# Patient Record
Sex: MALE | Race: WHITE | NOT HISPANIC OR LATINO | Employment: UNEMPLOYED | URBAN - METROPOLITAN AREA
[De-identification: names, ages, dates, MRNs, and addresses within clinical notes are randomized per-mention and may not be internally consistent; named-entity substitution may affect disease eponyms.]

---

## 2018-02-15 ENCOUNTER — TELEPHONE (OUTPATIENT)
Dept: TRANSPLANT | Facility: CLINIC | Age: 28
End: 2018-02-15

## 2018-02-15 NOTE — TELEPHONE ENCOUNTER
Spoke to patient ([phone]) who says he would like to transfer service to [facility] from [facility]. States has not been treated post heart transplant in at least the past year. [facility] past provider was with Dr. Khan. Informed patient to request records from [facility] coordinator and have sent here to [facility] [place], [place]. My contact information including fax information given to patient.

## 2018-02-16 ENCOUNTER — TELEPHONE (OUTPATIENT)
Dept: TRANSPLANT | Facility: CLINIC | Age: 28
End: 2018-02-16

## 2018-02-16 NOTE — TELEPHONE ENCOUNTER
Spoke to patient and mother, Ms Bobo. Informed to speak with post heart transplant coordinator at [facility] about transferring care to [facility] and request to please have records sent over to [facility]. Contact information given including fax ([phone]). Verbalized understanding.Both appear to be pleasant. Call PRN.

## 2018-07-13 ENCOUNTER — TELEPHONE (OUTPATIENT)
Dept: TRANSPLANT | Facility: CLINIC | Age: 28
End: 2018-07-13

## 2018-07-13 NOTE — TELEPHONE ENCOUNTER
Called Dr. Jaeger's office and spoke with nurse. Informed her that we received fax to follow up on pt. Informed her that we needs pt's EKG, ECHo and most recent labs sent over. Gave fax number and call back number.

## 2018-07-13 NOTE — TELEPHONE ENCOUNTER
"Called pt regarding fax sent from Dr. Jaeger's office asking to follow up with pt .  Called pt and asked about recent visit with Dr. Holliday. Pt stated that he has been having significant swelling in his lower extremities and unable to tie his shoes, increased SOB, Abdominal pain and anxiety. Pt reports he saw Dr. Jaeger and was started on Aldactone 25 mg daily and taking prograf 1 mg twice daily . Reviewed other medications per clinic note: Valsartan 160 mg take once daily by mouth, Aldactone 25 mg daily by mouth, both started on 6/29/18, lasix 40 mg daily , tacrolimus 1 mg BID, Lipitor 40 mg QHS, and Plavix 75 mg daily. Pt stated that he is not taking any of his meds except tacro and aldactone.   Pt stated that he has not been taking his medications until he saw Dr. Jaeger, pt reports being out of medications or not taking medications since 2016. Pt stated that he last saw his transplant team at [facility] was in 2015 or 2016. Pt reports he believes his coordinator's name was Trina with Dr. Gómez. . Asked pt if he has every been in rejection, pt stated the he has in 2011 and 2012 "as a side effect of transplantation". Pt reports he was [place] in [place] for fluid overload. Pt reports " the DrFarzad Took 12 urinal gallons off because had so much fluid". Pt stated that he is unaware of the doctor's name. Pt reports that he has labs, EKG, and ECHO on Friday from Dr. Jaeger's office. Informed pt that I would call to get the records. Pt stated that he smokes marijuana 2 x daily for pain. Pt reports he has been in pain since prior to transplant and having more pain now in his stomach and it helps with his appetite and sleep . Pt reports he quit smoking "cold Turkey" 3 months ago and denies alcohol use. .     Per review. Informed pt that he may be in rejection d/t his s/s and also that he has been out of his immunosuppression for approx 2 years. Instructed pt to go to his local ER to be worked up for " possible rejection. Pt verbalized understanding and stated that he will go to [facility]. Informed him that I will inform the on call physician. Pt verbalized understanding and given coordinators phone number if he has any additional questions.

## 2018-07-16 ENCOUNTER — TELEPHONE (OUTPATIENT)
Dept: TRANSPLANT | Facility: CLINIC | Age: 28
End: 2018-07-16

## 2018-07-16 NOTE — TELEPHONE ENCOUNTER
Returned call to pt to see if he went to the ER on Friday. No answer. VMbox not set up, unable to leave message.       Called pt's mother. Pt's mother stated that he did not go to the ED on last week. Pt's mother states that the pt's is having a lot of swelling and states that the pt's legs are red and tight . She reports that the pt does whatever he wants to . She reports that the pt did not like his prior transplant team or doctors and refused to go back about 2 + years ago. She reports bringing pt to the ED last month d/t pt c/o not being able to breathe and extremely SOB. Informed her that we instructed pt to go to the ED locally for possible rejection as he has not been taking his immunosuppress, and following up with HTS, and SOB, abdominal pain, and increased swelling. Pt's mother stated that she will try to bring pt to the ER and will call back if pt refuses. Gave pt's mother my phone number to call back. No other questions asked.

## 2018-07-16 NOTE — TELEPHONE ENCOUNTER
Pt's mother called and stated that the pt and his dad would rather come to [facility] than go to a local facility such as [facility]. She reports that the pt feels that it would be better to come to clinic. Informed her that d/t pt's S/s we ask pt to go to local hospital so that he can safely transfer to our facility and would be monitored when transported. Also, informed pt's mother that if pt is too sick, he would then sent down to the ER if too sick to be seen in clinic.  Informed pt that I would review with Dr. Scott and call back with an update.     Reviewed with Dr. Scott and instructed to have pt seen in clinic . Returned call to pt's mother. She stated that she spoke with her son and instead of a clinic visit, she and the pt's father will bring the pt to [facility]'s ER in the morning. Verbalized understanding. Informed pt's mother to go to the local ED or call 911 if pt's symptoms worsens or feels he needs to be seen sooner. Pt's mother verbalized understanding.

## 2018-07-17 ENCOUNTER — HOSPITAL ENCOUNTER (INPATIENT)
Facility: HOSPITAL | Age: 28
LOS: 6 days | Discharge: HOME | DRG: 286 | End: 2018-07-23
Attending: EMERGENCY MEDICINE | Admitting: INTERNAL MEDICINE
Payer: MEDICAID

## 2018-07-17 ENCOUNTER — TELEPHONE (OUTPATIENT)
Dept: TRANSPLANT | Facility: CLINIC | Age: 28
End: 2018-07-17

## 2018-07-17 DIAGNOSIS — I50.9 CHF (CONGESTIVE HEART FAILURE): ICD-10-CM

## 2018-07-17 DIAGNOSIS — R06.02 SOB (SHORTNESS OF BREATH): Primary | ICD-10-CM

## 2018-07-17 DIAGNOSIS — I25.811 ATHEROSCLEROSIS OF NATIVE CORONARY ARTERY OF TRANSPLANTED HEART WITHOUT ANGINA PECTORIS: ICD-10-CM

## 2018-07-17 DIAGNOSIS — R73.9 HYPERGLYCEMIA: ICD-10-CM

## 2018-07-17 DIAGNOSIS — F12.90 MARIJUANA SMOKER: ICD-10-CM

## 2018-07-17 DIAGNOSIS — T86.21 CHRONIC REJECTION OF HEART TRANSPLANT: ICD-10-CM

## 2018-07-17 DIAGNOSIS — Z91.148 NONCOMPLIANCE WITH MEDICATIONS: ICD-10-CM

## 2018-07-17 DIAGNOSIS — T86.22 HEART TRANSPLANT FAILURE AND REJECTION: ICD-10-CM

## 2018-07-17 DIAGNOSIS — I50.9 HEART FAILURE: ICD-10-CM

## 2018-07-17 DIAGNOSIS — I50.43 ACUTE ON CHRONIC COMBINED SYSTOLIC AND DIASTOLIC HEART FAILURE: ICD-10-CM

## 2018-07-17 DIAGNOSIS — R06.02 SHORTNESS OF BREATH: ICD-10-CM

## 2018-07-17 DIAGNOSIS — Z94.1 HISTORY OF HEART TRANSPLANT: ICD-10-CM

## 2018-07-17 DIAGNOSIS — T86.21 HEART TRANSPLANT FAILURE AND REJECTION: ICD-10-CM

## 2018-07-17 LAB
ABO + RH BLD: NORMAL
ALBUMIN SERPL BCP-MCNC: 2.5 G/DL
ALP SERPL-CCNC: 191 U/L
ALT SERPL W/O P-5'-P-CCNC: 20 U/L
AMPHET+METHAMPHET UR QL: NORMAL
ANION GAP SERPL CALC-SCNC: 7 MMOL/L
AST SERPL-CCNC: 26 U/L
BARBITURATES UR QL SCN>200 NG/ML: NEGATIVE
BASOPHILS # BLD AUTO: 0.05 K/UL
BASOPHILS NFR BLD: 1 %
BENZODIAZ UR QL SCN>200 NG/ML: NORMAL
BILIRUB SERPL-MCNC: 0.9 MG/DL
BLD GP AB SCN CELLS X3 SERPL QL: NORMAL
BNP SERPL-MCNC: 504 PG/ML
BUN SERPL-MCNC: 14 MG/DL
BZE UR QL SCN: NEGATIVE
CALCIUM SERPL-MCNC: 8.7 MG/DL
CANNABINOIDS UR QL SCN: NORMAL
CHLORIDE SERPL-SCNC: 98 MMOL/L
CO2 SERPL-SCNC: 31 MMOL/L
CORONARY STENOSIS: ABNORMAL
CREAT SERPL-MCNC: 0.9 MG/DL
CREAT UR-MCNC: 54 MG/DL
DIFFERENTIAL METHOD: ABNORMAL
EOSINOPHIL # BLD AUTO: 0.1 K/UL
EOSINOPHIL NFR BLD: 2.4 %
ERYTHROCYTE [DISTWIDTH] IN BLOOD BY AUTOMATED COUNT: 15.9 %
EST. GFR  (AFRICAN AMERICAN): >60 ML/MIN/1.73 M^2
EST. GFR  (NON AFRICAN AMERICAN): >60 ML/MIN/1.73 M^2
ESTIMATED PA SYSTOLIC PRESSURE: 34.89
ETHANOL UR-MCNC: <10 MG/DL
GLUCOSE SERPL-MCNC: 84 MG/DL
HCT VFR BLD AUTO: 44.1 %
HGB BLD-MCNC: 14.1 G/DL
IMM GRANULOCYTES # BLD AUTO: 0.01 K/UL
IMM GRANULOCYTES NFR BLD AUTO: 0.2 %
INR PPP: 1
LYMPHOCYTES # BLD AUTO: 0.4 K/UL
LYMPHOCYTES NFR BLD: 8.6 %
MAGNESIUM SERPL-MCNC: 2.1 MG/DL
MCH RBC QN AUTO: 27.4 PG
MCHC RBC AUTO-ENTMCNC: 32 G/DL
MCV RBC AUTO: 86 FL
METHADONE UR QL SCN>300 NG/ML: NEGATIVE
MITRAL VALVE REGURGITATION: ABNORMAL
MONOCYTES # BLD AUTO: 0.6 K/UL
MONOCYTES NFR BLD: 11 %
NEUTROPHILS # BLD AUTO: 3.9 K/UL
NEUTROPHILS NFR BLD: 76.8 %
NRBC BLD-RTO: 0 /100 WBC
OPIATES UR QL SCN: NEGATIVE
PCP UR QL SCN>25 NG/ML: NEGATIVE
PLATELET # BLD AUTO: 165 K/UL
PMV BLD AUTO: 10.6 FL
POTASSIUM SERPL-SCNC: 3.7 MMOL/L
PROT SERPL-MCNC: 5.6 G/DL
PROTHROMBIN TIME: 10.6 SEC
RBC # BLD AUTO: 5.15 M/UL
RETIRED EF AND QEF - SEE NOTES: 25 (ref 55–65)
SODIUM SERPL-SCNC: 136 MMOL/L
TACROLIMUS BLD-MCNC: 2.1 NG/ML
TOXICOLOGY INFORMATION: NORMAL
TRICUSPID VALVE REGURGITATION: ABNORMAL
TROPONIN I SERPL DL<=0.01 NG/ML-MCNC: 0.02 NG/ML
TSH SERPL DL<=0.005 MIU/L-ACNC: 1.88 UIU/ML
WBC # BLD AUTO: 5.02 K/UL

## 2018-07-17 PROCEDURE — 25000003 PHARM REV CODE 250: Performed by: INTERNAL MEDICINE

## 2018-07-17 PROCEDURE — 25000003 PHARM REV CODE 250: Performed by: NURSE PRACTITIONER

## 2018-07-17 PROCEDURE — S0028 INJECTION, FAMOTIDINE, 20 MG: HCPCS | Performed by: INTERNAL MEDICINE

## 2018-07-17 PROCEDURE — 85610 PROTHROMBIN TIME: CPT

## 2018-07-17 PROCEDURE — B2111ZZ FLUOROSCOPY OF MULTIPLE CORONARY ARTERIES USING LOW OSMOLAR CONTRAST: ICD-10-PCS | Performed by: INTERNAL MEDICINE

## 2018-07-17 PROCEDURE — 36415 COLL VENOUS BLD VENIPUNCTURE: CPT

## 2018-07-17 PROCEDURE — 20000000 HC ICU ROOM

## 2018-07-17 PROCEDURE — 94761 N-INVAS EAR/PLS OXIMETRY MLT: CPT

## 2018-07-17 PROCEDURE — 86703 HIV-1/HIV-2 1 RESULT ANTBDY: CPT

## 2018-07-17 PROCEDURE — 86832 HLA CLASS I HIGH DEFIN QUAL: CPT

## 2018-07-17 PROCEDURE — 99285 EMERGENCY DEPT VISIT HI MDM: CPT | Mod: 25

## 2018-07-17 PROCEDURE — 63600175 PHARM REV CODE 636 W HCPCS: Performed by: INTERNAL MEDICINE

## 2018-07-17 PROCEDURE — 86901 BLOOD TYPING SEROLOGIC RH(D): CPT

## 2018-07-17 PROCEDURE — 4A023N8 MEASUREMENT OF CARDIAC SAMPLING AND PRESSURE, BILATERAL, PERCUTANEOUS APPROACH: ICD-10-PCS | Performed by: INTERNAL MEDICINE

## 2018-07-17 PROCEDURE — 86833 HLA CLASS II HIGH DEFIN QUAL: CPT

## 2018-07-17 PROCEDURE — 84443 ASSAY THYROID STIM HORMONE: CPT

## 2018-07-17 PROCEDURE — 87340 HEPATITIS B SURFACE AG IA: CPT

## 2018-07-17 PROCEDURE — 86977 RBC SERUM PRETX INCUBJ/INHIB: CPT | Mod: 91

## 2018-07-17 PROCEDURE — 80197 ASSAY OF TACROLIMUS: CPT

## 2018-07-17 PROCEDURE — 93010 ELECTROCARDIOGRAM REPORT: CPT | Mod: ,,, | Performed by: INTERNAL MEDICINE

## 2018-07-17 PROCEDURE — 80307 DRUG TEST PRSMV CHEM ANLYZR: CPT

## 2018-07-17 PROCEDURE — 93306 TTE W/DOPPLER COMPLETE: CPT | Mod: 26,,, | Performed by: INTERNAL MEDICINE

## 2018-07-17 PROCEDURE — 27200044 CATH LAB PROCEDURE

## 2018-07-17 PROCEDURE — 25000003 PHARM REV CODE 250

## 2018-07-17 PROCEDURE — 86833 HLA CLASS II HIGH DEFIN QUAL: CPT | Mod: 91

## 2018-07-17 PROCEDURE — 85025 COMPLETE CBC W/AUTO DIFF WBC: CPT

## 2018-07-17 PROCEDURE — 93005 ELECTROCARDIOGRAM TRACING: CPT

## 2018-07-17 PROCEDURE — 93306 TTE W/DOPPLER COMPLETE: CPT

## 2018-07-17 PROCEDURE — 63600175 PHARM REV CODE 636 W HCPCS

## 2018-07-17 PROCEDURE — 63600175 PHARM REV CODE 636 W HCPCS: Performed by: NURSE PRACTITIONER

## 2018-07-17 PROCEDURE — 80197 ASSAY OF TACROLIMUS: CPT | Mod: 91

## 2018-07-17 PROCEDURE — 83880 ASSAY OF NATRIURETIC PEPTIDE: CPT

## 2018-07-17 PROCEDURE — 80053 COMPREHEN METABOLIC PANEL: CPT

## 2018-07-17 PROCEDURE — 83735 ASSAY OF MAGNESIUM: CPT

## 2018-07-17 PROCEDURE — B2161ZZ FLUOROSCOPY OF RIGHT AND LEFT HEART USING LOW OSMOLAR CONTRAST: ICD-10-PCS | Performed by: INTERNAL MEDICINE

## 2018-07-17 PROCEDURE — 99285 EMERGENCY DEPT VISIT HI MDM: CPT | Mod: ,,, | Performed by: PHYSICIAN ASSISTANT

## 2018-07-17 PROCEDURE — 86803 HEPATITIS C AB TEST: CPT

## 2018-07-17 PROCEDURE — 99291 CRITICAL CARE FIRST HOUR: CPT | Mod: ,,, | Performed by: INTERNAL MEDICINE

## 2018-07-17 PROCEDURE — 93460 R&L HRT ART/VENTRICLE ANGIO: CPT | Mod: 26,,, | Performed by: INTERNAL MEDICINE

## 2018-07-17 PROCEDURE — 84484 ASSAY OF TROPONIN QUANT: CPT

## 2018-07-17 PROCEDURE — 86832 HLA CLASS I HIGH DEFIN QUAL: CPT | Mod: 91

## 2018-07-17 PROCEDURE — 99152 MOD SED SAME PHYS/QHP 5/>YRS: CPT | Mod: ,,, | Performed by: INTERNAL MEDICINE

## 2018-07-17 RX ORDER — FAMOTIDINE 10 MG/ML
20 INJECTION INTRAVENOUS 2 TIMES DAILY
Status: COMPLETED | OUTPATIENT
Start: 2018-07-17 | End: 2018-07-18

## 2018-07-17 RX ORDER — DIPHENHYDRAMINE HYDROCHLORIDE 50 MG/ML
50 INJECTION INTRAMUSCULAR; INTRAVENOUS EVERY 6 HOURS
Status: COMPLETED | OUTPATIENT
Start: 2018-07-17 | End: 2018-07-18

## 2018-07-17 RX ORDER — SPIRONOLACTONE 25 MG/1
25 TABLET ORAL DAILY
Status: ON HOLD | COMMUNITY
End: 2018-07-23 | Stop reason: HOSPADM

## 2018-07-17 RX ORDER — MORPHINE SULFATE 4 MG/ML
3 INJECTION, SOLUTION INTRAMUSCULAR; INTRAVENOUS ONCE
Status: COMPLETED | OUTPATIENT
Start: 2018-07-17 | End: 2018-07-17

## 2018-07-17 RX ORDER — FUROSEMIDE 10 MG/ML
80 INJECTION INTRAMUSCULAR; INTRAVENOUS ONCE
Status: COMPLETED | OUTPATIENT
Start: 2018-07-17 | End: 2018-07-17

## 2018-07-17 RX ORDER — LISINOPRIL 5 MG/1
5 TABLET ORAL DAILY
Status: ON HOLD | COMMUNITY
End: 2018-07-23 | Stop reason: HOSPADM

## 2018-07-17 RX ORDER — ASPIRIN 81 MG/1
81 TABLET ORAL DAILY
Status: DISCONTINUED | OUTPATIENT
Start: 2018-07-18 | End: 2018-07-23 | Stop reason: HOSPADM

## 2018-07-17 RX ORDER — TACROLIMUS 1 MG/1
1 CAPSULE ORAL 2 TIMES DAILY
Status: DISCONTINUED | OUTPATIENT
Start: 2018-07-17 | End: 2018-07-19

## 2018-07-17 RX ORDER — METHYLPREDNISOLONE SOD SUCC 125 MG
125 VIAL (EA) INJECTION EVERY 6 HOURS
Status: COMPLETED | OUTPATIENT
Start: 2018-07-17 | End: 2018-07-18

## 2018-07-17 RX ORDER — TACROLIMUS 1 MG/1
CAPSULE ORAL EVERY 12 HOURS
Status: ON HOLD | COMMUNITY
End: 2018-07-23 | Stop reason: HOSPADM

## 2018-07-17 RX ORDER — TACROLIMUS 1 MG/1
1 CAPSULE ORAL 2 TIMES DAILY
Status: DISCONTINUED | OUTPATIENT
Start: 2018-07-17 | End: 2018-07-17

## 2018-07-17 RX ORDER — FUROSEMIDE 40 MG/1
40 TABLET ORAL 2 TIMES DAILY
Status: ON HOLD | COMMUNITY
End: 2018-07-23 | Stop reason: HOSPADM

## 2018-07-17 RX ORDER — CLOPIDOGREL 300 MG/1
600 TABLET, FILM COATED ORAL ONCE
Status: DISCONTINUED | OUTPATIENT
Start: 2018-07-17 | End: 2018-07-17

## 2018-07-17 RX ORDER — CLOPIDOGREL BISULFATE 75 MG/1
75 TABLET ORAL DAILY
Status: ON HOLD | COMMUNITY
End: 2018-07-23

## 2018-07-17 RX ORDER — CLOPIDOGREL BISULFATE 75 MG/1
75 TABLET ORAL DAILY
Status: DISCONTINUED | OUTPATIENT
Start: 2018-07-17 | End: 2018-07-23 | Stop reason: HOSPADM

## 2018-07-17 RX ADMIN — DIPHENHYDRAMINE HYDROCHLORIDE 50 MG: 50 INJECTION, SOLUTION INTRAMUSCULAR; INTRAVENOUS at 03:07

## 2018-07-17 RX ADMIN — METHYLPREDNISOLONE SODIUM SUCCINATE 125 MG: 125 INJECTION, POWDER, FOR SOLUTION INTRAMUSCULAR; INTRAVENOUS at 03:07

## 2018-07-17 RX ADMIN — TACROLIMUS 1 MG: 1 CAPSULE ORAL at 01:07

## 2018-07-17 RX ADMIN — FUROSEMIDE 80 MG: 10 INJECTION, SOLUTION INTRAMUSCULAR; INTRAVENOUS at 08:07

## 2018-07-17 RX ADMIN — MORPHINE SULFATE 3 MG: 4 INJECTION INTRAVENOUS at 09:07

## 2018-07-17 RX ADMIN — CLOPIDOGREL 75 MG: 75 TABLET, FILM COATED ORAL at 01:07

## 2018-07-17 RX ADMIN — TACROLIMUS 1 MG: 1 CAPSULE ORAL at 08:07

## 2018-07-17 RX ADMIN — METHYLPREDNISOLONE SODIUM SUCCINATE: 1 INJECTION, POWDER, LYOPHILIZED, FOR SOLUTION INTRAMUSCULAR; INTRAVENOUS at 08:07

## 2018-07-17 RX ADMIN — FAMOTIDINE 20 MG: 10 INJECTION, SOLUTION INTRAVENOUS at 03:07

## 2018-07-17 RX ADMIN — FUROSEMIDE 10 MG/HR: 10 INJECTION, SOLUTION INTRAMUSCULAR; INTRAVENOUS at 09:07

## 2018-07-17 NOTE — SUBJECTIVE & OBJECTIVE
Past Medical History:   Diagnosis Date    Heart transplanted     Hypertension        Past Surgical History:   Procedure Laterality Date    CARDIAC SURGERY      HEART TRANSPLANT         Review of patient's allergies indicates:  No Known Allergies    Current Facility-Administered Medications   Medication    clopidogrel tablet 75 mg    methylPREDNISolone sodium succinate (SOLU-MEDROL) 1,000 mg in dextrose 5 % 100 mL IVPB    tacrolimus capsule 1 mg     Family History     Problem Relation (Age of Onset)    Diabetes Mother    Hypertension Mother, Father        Social History Main Topics    Smoking status: Former Smoker     Quit date: 4/17/2018    Smokeless tobacco: Not on file    Alcohol use No    Drug use: Yes     Types: Marijuana      Comment: several times a day    Sexual activity: Not on file     Review of Systems   Constitutional: Negative.    HENT: Negative.    Eyes: Negative.    Respiratory: Positive for cough and shortness of breath.    Cardiovascular: Positive for leg swelling.   Gastrointestinal: Negative.    Endocrine: Negative.    Musculoskeletal: Negative.    Skin: Negative.    Allergic/Immunologic: Negative.    Neurological: Negative.    Hematological: Negative.    Psychiatric/Behavioral: Negative.      Objective:     Vital Signs (Most Recent):  Temp: 97.2 °F (36.2 °C) (07/17/18 1226)  Pulse: 100 (07/17/18 1226)  Resp: 16 (07/17/18 1226)  BP: 102/61 (07/17/18 1226)  SpO2: 100 % (07/17/18 1226) Vital Signs (24h Range):  Temp:  [97.2 °F (36.2 °C)-97.7 °F (36.5 °C)] 97.2 °F (36.2 °C)  Pulse:  [] 100  Resp:  [16-18] 16  SpO2:  [96 %-100 %] 100 %  BP: (101-120)/(61-77) 102/61     Patient Vitals for the past 72 hrs (Last 3 readings):   Weight   07/17/18 1203 94.2 kg (207 lb 10.8 oz)   07/17/18 0941 95.3 kg (210 lb)     Body mass index is 27.4 kg/m².    No intake or output data in the 24 hours ending 07/17/18 1247    Physical Exam   Constitutional: He is oriented to person, place, and time. He  appears well-developed and well-nourished.   HENT:   Head: Normocephalic and atraumatic.   Eyes: Conjunctivae and EOM are normal. Pupils are equal, round, and reactive to light.   Neck: Normal range of motion. Neck supple.   Unable to visualize JVP. No JVD.   Cardiovascular: Normal rate and regular rhythm.    + S4   Pulmonary/Chest: Effort normal.   Few scattered rhonchi   Abdominal: Soft. Bowel sounds are normal.   Musculoskeletal: Normal range of motion.   3-4+ edema scrotum down   Neurological: He is alert and oriented to person, place, and time.   Skin: Skin is warm and dry. Capillary refill takes less than 2 seconds.   Psychiatric: He has a normal mood and affect. His behavior is normal. Judgment and thought content normal.       Significant Labs:  CBC:    Recent Labs  Lab 07/17/18  1035   WBC 5.02   RBC 5.15   HGB 14.1   HCT 44.1      MCV 86   MCH 27.4   MCHC 32.0     BNP:    Recent Labs  Lab 07/17/18  1035   *     CMP:    Recent Labs  Lab 07/17/18  1035   GLU 84   CALCIUM 8.7   ALBUMIN 2.5*   PROT 5.6*      K 3.7   CO2 31*   CL 98   BUN 14   CREATININE 0.9   ALKPHOS 191*   ALT 20   AST 26   BILITOT 0.9      Coagulation:   No results for input(s): PT, INR, APTT in the last 168 hours.  LDH:  No results for input(s): LDH in the last 72 hours.  Microbiology:  Microbiology Results (last 7 days)     ** No results found for the last 168 hours. **          I have reviewed all pertinent labs within the past 24 hours.    Diagnostic Results:  I have reviewed all pertinent imaging results/findings within the past 24 hours.

## 2018-07-17 NOTE — NURSING TRANSFER
Nursing Transfer Note      7/17/2018     Transfer From: ER     Transfer via stretcher    Transfer with cardiac monitoring    Transported by pt escort         Medicines sent: No    Chart send with patient: Yes    Notified: mother and father at bedside    Upon arrival to floor: patient oriented to room, call bell in reach and bed in lowest position

## 2018-07-17 NOTE — ED NOTES
LOC: The patient is awake, alert, and aware of environment. The patient is oriented x 3 and speaking appropriately.   APPEARANCE: No acute distress noted.   PSYCHOSOCIAL: Patient is calm and cooperative.   SKIN: The skin is warm, dry.   RESPIRATORY: Airway is open and patent. Bilateral chest rise and fall. Respirations are spontaneous, even and unlabored. Normal effort and rate noted. No accessory muscle use noted.   CARDIAC: Patient has a normal rate and rhythm. Pt reports he always sob.   ABDOMEN: Soft and non tender to palpation. No distention noted.   URINARY:  Pt reports its difficult to void unless taking his fluid pill.   NEUROLOGIC: Eyes open spontaneously. Speech clear. Tolerating saliva secretions well. Able to follow commands, demonstrating ability to actively and appropriately communicate within context of current conversation. Symmetrical facial muscles. Moving all extremities well. Movement is purposeful.   MUSCULOSKELETAL: Pt has bilateral 3+ pitting edema.

## 2018-07-17 NOTE — ASSESSMENT & PLAN NOTE
- LHC +/- PCI with IVUS; right CFA access; JR4/JL4 4Fr diagnostic and 6Fr PCI  - RHC with biopsy via the right CFV  - HF and rejection management per HTS  - IV methypred and H1/H2 blockers 2/2 dye allergy  - discussed with interventional fellow: 600 mg IV Plavix x1    -The risks, benefits & alternatives of the procedure were explained to the patient.    -The risks of coronary angiography include but are not limited to:  Bleeding, infection, heart rhythm abnormalities, allergic reactions, kidney injury, stroke and death.    -Should stenting be indicated, the patient has agreed to dual anti-platelet therapy for 1-consecutive year with a drug-eluting stent and a minimum of 1-month with the use of a bare metal stent.    -The risks of moderate sedation include hypotension, respiratory depression, arrhythmias, bronchospasm, & death.    -Informed consent was obtained & the patient is agreeable to proceed with the procedure.  -This patient was discussed with the attending interventional cardiologist who agrees with the above assessment & plan.    Cardiac arrest out of hospital? No    CSHA Clinical Frailty Scale: Mildly frail    Heart failure: yes    Diabetes Mellitus: no    Dialysis: no

## 2018-07-17 NOTE — ASSESSMENT & PLAN NOTE
27 y.o M with a PMHx of OHTx in 2010 that presents with 1 month worsening of CORTEZ, LE edema, abdominal swelling with associated vomiting for the last 2-3 days. He initially was transplanted at [facility] in 2010 and followed up with their heart transplant group until 2015. Stopped taking his immunosupressants at that time as he did not have a prescription. Recently restarted back on Tacrolimus by a cardiologist in his hometown for the last year. EF on bedside echo was 10% with moderate RV dysfunction. Concern for rejection of transplanted heart.    Plan  - Solumedrol 1g x 1  - Admit to [facility], place in CCU  - 2D echo w/ CFD    Plan discussed with Dr. Abdi

## 2018-07-17 NOTE — NURSING
Went to pt's room. Pt signed OMKAR forms. Faxed request to [facility] . Awaiting records.

## 2018-07-17 NOTE — ED PROVIDER NOTES
Encounter Date: 7/17/2018    SCRIBE #1 NOTE: I, Arturo Mojica, am scribing for, and in the presence of,  Dr. Lopes. I have scribed the following portions of the note - the EKG reading and the APC attestation.       History     Chief Complaint   Patient presents with    Shortness of Breath     had heart transplant in 2010 at [facility], here to see if in rejection     Patient is a 27-year-old male status post heart transplant in 2010 for cardiomyopathy, hypertension is presenting to the ER for evaluation of shortness of breath.  This has been ongoing for approximately 3-4 weeks.  He states it is usually on exertion.  He has also had some intermittent chest discomfort with exertion.  He also complains of increasing swelling to his lower extremities that radiate into his scrotum.  He states he has not been compliant with his if verapamil as he ran out of this medication about a month ago.  He is also noncompliant with his immunosuppression medications.  Patient was directed to come to [facility] the facility by his cardiologist from [place], [place].  Family states that blood work was obtained last week and there is some concern for rejection.  Patient has not had any fever or chills.  No URI symptoms. He has had some intermittent nausea and vomiting, no abdominal pain or diarrhea otherwise.  Heart transplant was done at [facility]      The history is provided by the patient.     Review of patient's allergies indicates:  No Known Allergies  Past Medical History:   Diagnosis Date    Heart transplanted     Hypertension      Past Surgical History:   Procedure Laterality Date    CARDIAC SURGERY      HEART TRANSPLANT       Family History   Problem Relation Age of Onset    Diabetes Mother     Hypertension Mother     Hypertension Father      Social History   Substance Use Topics    Smoking status: Former Smoker     Quit date: 4/17/2018    Smokeless tobacco: Not on file    Alcohol use No     Review of Systems    Constitutional: Negative for fever.   HENT: Negative for congestion.    Eyes: Negative for photophobia and visual disturbance.   Respiratory: Positive for shortness of breath. Negative for cough.    Cardiovascular: Positive for leg swelling. Negative for chest pain and palpitations.   Gastrointestinal: Positive for nausea. Negative for abdominal pain and vomiting.   Genitourinary: Positive for scrotal swelling. Negative for dysuria.   Musculoskeletal: Negative for myalgias.   Skin: Negative for rash and wound.   Allergic/Immunologic: Positive for immunocompromised state.   Neurological: Negative for dizziness, syncope, weakness and light-headedness.   Hematological: Does not bruise/bleed easily.   Psychiatric/Behavioral: Negative for confusion.       Physical Exam     Initial Vitals [07/17/18 0941]   BP Pulse Resp Temp SpO2   101/67 105 18 97.7 °F (36.5 °C) 96 %      MAP       --         Physical Exam    Constitutional: He appears well-developed and well-nourished. He is not diaphoretic. No distress.   HENT:   Head: Normocephalic and atraumatic.   Mouth/Throat: Oropharynx is clear and moist.   Eyes: Conjunctivae and EOM are normal.   Neck: Neck supple.   Cardiovascular: Normal rate, regular rhythm, normal heart sounds, intact distal pulses and normal pulses.   3+ pitting edema lower extremities radiating up into thighs bilaterally    Pulmonary/Chest: Breath sounds normal. No respiratory distress.   Abdominal: Soft. He exhibits no distension. There is no tenderness.   Neurological: He is alert and oriented to person, place, and time.   Skin: Skin is warm and dry.         ED Course   Procedures  Labs Reviewed   CBC W/ AUTO DIFFERENTIAL - Abnormal; Notable for the following:        Result Value    RDW 15.9 (*)     Lymph # 0.4 (*)     Gran% 76.8 (*)     Lymph% 8.6 (*)     All other components within normal limits   B-TYPE NATRIURETIC PEPTIDE - Abnormal; Notable for the following:      (*)     All other  components within normal limits   COMPREHENSIVE METABOLIC PANEL - Abnormal; Notable for the following:     CO2 31 (*)     Total Protein 5.6 (*)     Albumin 2.5 (*)     Alkaline Phosphatase 191 (*)     Anion Gap 7 (*)     All other components within normal limits   TROPONIN I   MAGNESIUM   POCT GLUCOSE MONITORING CONTINUOUS     EKG Readings: (Independently Interpreted)   NSR with a heart rate of 98 bpm. RBBB present.        Imaging Results          X-Ray Chest PA And Lateral (Final result)  Result time 07/17/18 12:20:47    Final result by Zaki Dawkins MD (07/17/18 12:20:47)                 Impression:      Mild cardiomegaly.      Electronically signed by: Zaki Dawkins MD  Date:    07/17/2018  Time:    12:20             Narrative:    EXAMINATION:  XR CHEST PA AND LATERAL    TECHNIQUE:  PA and lateral views of the chest were performed.    COMPARISON:  None    FINDINGS:  Sternotomy.  Cardiomegaly.  Minimal parenchymal scarring.  Lobar consolidation.  Osseous thorax intact.                                 Medical Decision Making:   History:   Old Medical Records: I decided to obtain old medical records.  Independently Interpreted Test(s):   I have ordered and independently interpreted EKG Reading(s) - see prior notes  Clinical Tests:   Lab Tests: Ordered and Reviewed  Radiological Study: Ordered and Reviewed  Medical Tests: Ordered and Reviewed  Other:   I have discussed this case with another health care provider.       APC / Resident Notes:   Patient was seen in the ER promptly upon arrival.  He is afebrile, no acute distress. He is able to the speak in full complete sentences without difficulty.  O2 saturations on room air is 99%.  Physical examination does reveal lower extremity edema radiating up into his thigh and groin.  No previous records noted in epic at this time.    Cardiology was consulted.  Cardiac workup initiated.  Laboratory studies show normal white count of 5.0.  Hemoglobin stable. BNP  elevated to 504.  Troponin unremarkable. Normal sinus rhythm at a rate of 98 bmp, RBBB present.  X-ray of chest revealed mild cardiomegaly.        Cardiology will admit patient to their service for heart transplant failure.  Upon reassessment patient is resting comfortably.  He continues to be stable during his stay in the ED and will be admitted to the ICU for close monitoring. The care of this patient was overseen by attending physician who agrees with treatment, plan, and disposition.           Scribe Attestation:   Scribe #1: I performed the above scribed service and the documentation accurately describes the services I performed. I attest to the accuracy of the note.    Attending Attestation:     Physician Attestation Statement for NP/PA:   I have conducted a face to face encounter with this patient in addition to the NP/PA, due to Medical Complexity    Other NP/PA Attestation Additions:    History of Present Illness: Pt presents with SOB and fluid retention with a hx of a heart transplant 10 years ago.     Medical Decision Making: Will discuss case with cardiology.                     Clinical Impression:   The primary encounter diagnosis was SOB (shortness of breath). Diagnoses of Shortness of breath, Chronic rejection of heart transplant, History of heart transplant, and Heart transplant failure and rejection were also pertinent to this visit.      Disposition:   Disposition: Admitted  Condition: Stable                        Lupe Corado PA-C  07/17/18 9404

## 2018-07-17 NOTE — PROVIDER PROGRESS NOTES - EMERGENCY DEPT.
Encounter Date: 7/17/2018    ED Physician Progress Notes         EKG - STEMI Decision  Initial Reading: No STEMI present.  Response: No Action Needed.

## 2018-07-17 NOTE — ASSESSMENT & PLAN NOTE
- LHC +/- PCI with IVUS; right CFA access; JR4/JL4 4Fr diagnostic and 6Fr PCI  - RHC with biopsy via the right CFV  - HF and rejection management per HTS  - IV methypred and H1/H2 blockers 2/2 dye allergy  - discussed with interventional fellow: no Plavix load    -The risks, benefits & alternatives of the procedure were explained to the patient.    -The risks of coronary angiography include but are not limited to:  Bleeding, infection, heart rhythm abnormalities, allergic reactions, kidney injury, stroke and death.    -Should stenting be indicated, the patient has agreed to dual anti-platelet therapy for 1-consecutive year with a drug-eluting stent and a minimum of 1-month with the use of a bare metal stent.    -The risks of moderate sedation include hypotension, respiratory depression, arrhythmias, bronchospasm, & death.    -Informed consent was obtained & the patient is agreeable to proceed with the procedure.  -This patient was discussed with the attending interventional cardiologist who agrees with the above assessment & plan.    Cardiac arrest out of hospital? No    CSHA Clinical Frailty Scale: Mildly frail    Heart failure: yes    Diabetes Mellitus: no    Dialysis: no

## 2018-07-17 NOTE — TELEPHONE ENCOUNTER
Called [facility] to request records. Spoke with Nurse in cardiology informing her that pt was in the ED and we need records such as LHC, BX's, rejections, clinic visit . Per review nurse stated that she is unaware if she can send over the records. Phone number and fax number given. Nurse stated that she would contact her supervisor and call back if regarding records.

## 2018-07-17 NOTE — HPI
27 y.o M with a PMhx of OHTx in 2010 at [facility] that presents to the ER with 1 month of worsening CORTEZ, LE swelling and abdominal swelling with 2-3 days of associated vomiting.     Patient was initially transplanted at [facility] in 2010 and followed up there until 2015. He has not seen a transplant cardiologist since that time and was off of his immunosupressants following that due to lack of a prescription. His local cardiologist (Dr. Jaeger) started him back on Tacrolimus approximately 1 year ago and started valsartan and aldactone in the last month. Patient reports that the only medications that he takes are Tacrolimus and Aldactone.     Patient's mother called into transplant clinic earlier this week and patient was instructed to go to the local ER and transferred for further care.

## 2018-07-17 NOTE — HPI
"28 y/o male with PMH of HTN, HLD, heart transplant in 2010 ([facility]), CAD s/p PCI to LAD  (2 overlapping JOSE) and RPL (1 JOSE) in 6/2016, HFrEF (EF 25-30% with depressed RV function and elevated filling pressures in 6/2016) and chronic rejection of a heart transplant who presented to a cardiologist in [place], [place] to establish care (Dr. Jaeger) today with LE swelling. His has LE edema, N/V and CORTEZ for two months. He reports CORTEZ to four blocks as well as sharp CP that lasts seconds during exertion. He admits to medical nonadherence to immunosuppressive therapy because of "working, staying busy". The patient reports non-adherence to his immunosuppressants for about five years. He denies smoking tobacco and EtOH use; however, he smokes marijuana. The patient reports orthopnea and cough when lying flat but denies PND. BNP noted to be 504. Troponin normal at 0.023. CXR with cardiomegaly. ECG c/w ST, RAD, iRBBB with  BPM. He has an allergy to contrast. The patient last ate at 6A. His EF 45-50% in 2/2018. The patient and his family say he has stents placed in the right groin because "they couldn't get in" for a [facility].     TTE today    1 - Post-cardiac transplantation study.     2 - Severely depressed left ventricular systolic function (EF 25-30%).     3 - Upper limit of normal left ventricular enlargement.     4 - Right ventricular enlargement with not well seen systolic function.     5 - The estimated PA systolic pressure is 35 mmHg. Septal flattening consistent wtih RV pressure and volume overload, PA pressure is underestimated.     6 - Trivial to mild mitral regurgitation.     7 - Trivial to mild tricuspid regurgitation.     8 - Trivial pulmonic regurgitation.     9 - Increased central venous pressure.     Home medications:  Lisinopril 5 mg qD  Lasix 40 mg qD  Spironolactone 25 mg qD  Plavix 75 mg qD  "

## 2018-07-17 NOTE — HPI
28 yo male, new to our service, s/p OHTx in 2010 at [facility] presented to the ED with ~ 2 week h/o worsening CORTEZ and JUN that extends up to scrotum. He relates that he cut ties with the [facility] program ~ 2-3 years ago after undergoing coronary PCI's to his transplanted heart. Was doing well until April when he was hospitalized at [facility] for volume overload and was diuresed. Was seen by Dr. Holliday 7/6/18 and TTE on that day was 53%, RV mod enlarge, LA markedly dilated and mild to mod MR. TTE today shows decline in LVEF to 25-30%.    He admits that he takes his meds sporadically. Supposed to be on Prograf 1 mg bid. Also supposed to take Plavix for his PCI's, and says he takes Lasix 80 mg from time to time. Brought his pill bottles - all are out of date. He admits to smoking marijuana daily. 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 Normotensive.

## 2018-07-17 NOTE — H&P
[facility]  Heart Transplant  H&P    Patient Name: Jamar Smiley  MRN: [id number]  Admission Date: 7/17/2018  Attending Physician: Joanne Abdi MD  Primary Care Provider: Primary Doctor No  Principal Problem:Heart transplant failure and rejection    Subjective:     History of Present Illness:  28 yo male, new to our service, s/p OHTx in 2010 at [facility] presented to the ED with ~ 2 week h/o worsening CORTEZ and JUN that extends up to scrotum. He relates that he cut ties with the [facility] program ~ 2-3 years ago after undergoing coronary PCI's to his transplanted heart. Was doing well until April when he was hospitalized at [facility] for volume overload and was diuresed. Was seen by Dr. Holliday 7/6/18. TTE in February showed LVEF 53%, RV mod enlarge, LA markedly dilated and mild to mod MR. TTE today shows decline in LVEF to 25-30%.    He admits that he takes his meds sporadically. Supposed to be on Prograf 1 mg bid. Also supposed to take Plavix for his PCI's, and says he takes Lasix 80 mg from time to time. Brought his pill bottles - all are out of date. He admits to smoking marijuana daily. 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 Normotensive.    Past Medical History:   Diagnosis Date    Heart transplanted     Hypertension        Past Surgical History:   Procedure Laterality Date    CARDIAC SURGERY      HEART TRANSPLANT         Review of patient's allergies indicates:  No Known Allergies    Current Facility-Administered Medications   Medication    clopidogrel tablet 75 mg    methylPREDNISolone sodium succinate (SOLU-MEDROL) 1,000 mg in dextrose 5 % 100 mL IVPB    tacrolimus capsule 1 mg     Family History     Problem Relation (Age of Onset)    Diabetes Mother    Hypertension Mother, Father        Social History Main Topics    Smoking status: Former Smoker     Quit date: 4/17/2018    Smokeless tobacco: Not on file    Alcohol use No    Drug use: Yes      Types: Marijuana      Comment: several times a day    Sexual activity: Not on file     Review of Systems   Constitutional: Negative.    HENT: Negative.    Eyes: Negative.    Respiratory: Positive for cough and shortness of breath.    Cardiovascular: Positive for leg swelling.   Gastrointestinal: Negative.    Endocrine: Negative.    Musculoskeletal: Negative.    Skin: Negative.    Allergic/Immunologic: Negative.    Neurological: Negative.    Hematological: Negative.    Psychiatric/Behavioral: Negative.      Objective:     Vital Signs (Most Recent):  Temp: 97.2 °F (36.2 °C) (07/17/18 1226)  Pulse: 100 (07/17/18 1226)  Resp: 16 (07/17/18 1226)  BP: 102/61 (07/17/18 1226)  SpO2: 100 % (07/17/18 1226) Vital Signs (24h Range):  Temp:  [97.2 °F (36.2 °C)-97.7 °F (36.5 °C)] 97.2 °F (36.2 °C)  Pulse:  [] 100  Resp:  [16-18] 16  SpO2:  [96 %-100 %] 100 %  BP: (101-120)/(61-77) 102/61     Patient Vitals for the past 72 hrs (Last 3 readings):   Weight   07/17/18 1203 94.2 kg (207 lb 10.8 oz)   07/17/18 0941 95.3 kg (210 lb)     Body mass index is 27.4 kg/m².    No intake or output data in the 24 hours ending 07/17/18 1247    Physical Exam   Constitutional: He is oriented to person, place, and time. He appears well-developed and well-nourished.   HENT:   Head: Normocephalic and atraumatic.   Eyes: Conjunctivae and EOM are normal. Pupils are equal, round, and reactive to light.   Neck: Normal range of motion. Neck supple.   Unable to visualize JVP. No JVD.   Cardiovascular: Normal rate and regular rhythm.    + S4   Pulmonary/Chest: Effort normal.   Few scattered rhonchi   Abdominal: Soft. Bowel sounds are normal.   Musculoskeletal: Normal range of motion.   3-4+ edema scrotum down   Neurological: He is alert and oriented to person, place, and time.   Skin: Skin is warm and dry. Capillary refill takes less than 2 seconds.   Psychiatric: He has a normal mood and affect. His behavior is normal. Judgment and thought content  normal.       Significant Labs:  CBC:    Recent Labs  Lab 07/17/18  1035   WBC 5.02   RBC 5.15   HGB 14.1   HCT 44.1      MCV 86   MCH 27.4   MCHC 32.0     BNP:    Recent Labs  Lab 07/17/18  1035   *     CMP:    Recent Labs  Lab 07/17/18  1035   GLU 84   CALCIUM 8.7   ALBUMIN 2.5*   PROT 5.6*      K 3.7   CO2 31*   CL 98   BUN 14   CREATININE 0.9   ALKPHOS 191*   ALT 20   AST 26   BILITOT 0.9      Coagulation:   No results for input(s): PT, INR, APTT in the last 168 hours.  LDH:  No results for input(s): LDH in the last 72 hours.  Microbiology:  Microbiology Results (last 7 days)     ** No results found for the last 168 hours. **          I have reviewed all pertinent labs within the past 24 hours.    Diagnostic Results:  I have reviewed all pertinent imaging results/findings within the past 24 hours.    Assessment/Plan:     * Heart transplant failure and rejection    - Admit patient to ICU for close monitoring  - L/RHC with EMBx today, then give 1 gram IV Solu-Medrol  - Check DSA's - will try to obtain donor from [facility] or [facility] filling pressures determined  - Check Prograf level, goal level needs to be 5-10  - Continue Plavix for h/o PCI's and add ASA 81 mg qd  - Will need to add statin this admit        SOB (shortness of breath)    - See [place] [id number]  Heart Transplant  [facility]

## 2018-07-17 NOTE — ED TRIAGE NOTES
Patient states that he thinks he is in heart rejection. Patient had a heart transplant 3/2012. Patient states that he has had shortness of breath and swelling for the last few weeks. Denies pain, fever, chills.

## 2018-07-17 NOTE — SUBJECTIVE & OBJECTIVE
Past Medical History:   Diagnosis Date    Heart transplanted     Hypertension        Past Surgical History:   Procedure Laterality Date    CARDIAC SURGERY      HEART TRANSPLANT         Review of patient's allergies indicates:  No Known Allergies    PTA Medications   Medication Sig    clopidogrel (PLAVIX) 75 mg tablet Take 75 mg by mouth once daily.    furosemide (LASIX) 40 MG tablet Take 40 mg by mouth 2 (two) times daily.    lisinopril (PRINIVIL,ZESTRIL) 5 MG tablet Take 5 mg by mouth once daily.    spironolactone (ALDACTONE) 25 MG tablet Take 25 mg by mouth once daily.    tacrolimus (PROGRAF) 1 MG Cap Take by mouth every 12 (twelve) hours.     Family History     Problem Relation (Age of Onset)    Diabetes Mother    Hypertension Mother, Father        Social History Main Topics    Smoking status: Former Smoker     Quit date: 4/17/2018    Smokeless tobacco: Not on file    Alcohol use No    Drug use: Yes     Types: Marijuana      Comment: several times a day    Sexual activity: Not on file     Review of Systems   Constitution: Negative for chills and fever.   HENT: Negative for ear discharge.    Eyes: Negative for pain and visual disturbance.   Cardiovascular: Positive for chest pain, dyspnea on exertion and leg swelling. Negative for irregular heartbeat, orthopnea, palpitations, paroxysmal nocturnal dyspnea and syncope.   Respiratory: Negative for hemoptysis, shortness of breath and wheezing.    Skin: Negative for rash and suspicious lesions.   Musculoskeletal: Positive for joint pain. Negative for muscle weakness.   Gastrointestinal: Positive for nausea and vomiting. Negative for abdominal pain, diarrhea, hematemesis, hematochezia and melena.   Genitourinary: Negative for dysuria and frequency.   Neurological: Negative for focal weakness, headaches and tremors.   Psychiatric/Behavioral: Negative for altered mental status, suicidal ideas and thoughts of violence.     Objective:     Vital Signs (Most  Recent):  Temp: 97.7 °F (36.5 °C) (07/17/18 1400)  Pulse: 96 (07/17/18 1415)  Resp: (!) 27 (07/17/18 1415)  BP: 117/78 (07/17/18 1400)  SpO2: 100 % (07/17/18 1415) Vital Signs (24h Range):  Temp:  [97.2 °F (36.2 °C)-97.7 °F (36.5 °C)] 97.7 °F (36.5 °C)  Pulse:  [] 96  Resp:  [16-27] 27  SpO2:  [96 %-100 %] 100 %  BP: (101-120)/(61-78) 117/78     Weight: 96.8 kg (213 lb 6.5 oz)  Body mass index is 28.16 kg/m².    SpO2: 100 %  O2 Device (Oxygen Therapy): room air    No intake or output data in the 24 hours ending 07/17/18 1535    Lines/Drains/Airways     Peripheral Intravenous Line                 Peripheral IV - Single Lumen 07/17/18 1026 Right Forearm less than 1 day                Physical Exam   Constitutional: He is oriented to person, place, and time. He appears well-developed and well-nourished.   HENT:   Head: Normocephalic and atraumatic.   Eyes: EOM are normal.   Neck: Carotid bruit is not present.   Cardiovascular: Normal rate and regular rhythm.  Exam reveals no gallop and no friction rub.    Pulses:       Femoral pulses are 2+ on the left side.       Dorsalis pedis pulses are 1+ on the right side, and 1+ on the left side.        Posterior tibial pulses are 1+ on the right side, and 1+ on the left side.   Dopplerable right and left radial arteries. Dopplerable bilateral popliteal arteries. Dopplerable right femoral arteries. 1+ bilaterally LE edema L>R    Pulmonary/Chest: Effort normal. No stridor. He has wheezes. He has no rales.   Abdominal: Soft. Bowel sounds are normal. There is no rebound and no guarding.   Musculoskeletal: He exhibits no edema.   Neurological: He is alert and oriented to person, place, and time. No cranial nerve deficit.   Skin: Skin is warm and dry.   Right groin scar.   Psychiatric: He has a normal mood and affect. His behavior is normal.       Significant Labs:   CMP     Recent Labs  Lab 07/17/18  1035      K 3.7   CL 98   CO2 31*   GLU 84   BUN 14   CREATININE 0.9    CALCIUM 8.7   PROT 5.6*   ALBUMIN 2.5*   BILITOT 0.9   ALKPHOS 191*   AST 26   ALT 20   ANIONGAP 7*   ESTGFRAFRICA >60.0   EGFRNONAA >60.0   , CBC     Recent Labs  Lab 07/17/18  1035   WBC 5.02   HGB 14.1   HCT 44.1      , INR     Recent Labs  Lab 07/17/18  1308   INR 1.0   , Lipid Panel No results for input(s): CHOL, HDL, LDLCALC, TRIG, CHOLHDL in the last 48 hours. and Troponin     Recent Labs  Lab 07/17/18  1035   TROPONINI 0.023

## 2018-07-17 NOTE — SUBJECTIVE & OBJECTIVE
Past Medical History:   Diagnosis Date    Heart transplanted     Hypertension        Past Surgical History:   Procedure Laterality Date    CARDIAC SURGERY      HEART TRANSPLANT         Review of patient's allergies indicates:  No Known Allergies    No current facility-administered medications on file prior to encounter.      No current outpatient prescriptions on file prior to encounter.     Family History     Problem Relation (Age of Onset)    Diabetes Mother    Hypertension Mother, Father        Social History Main Topics    Smoking status: Former Smoker     Quit date: 4/17/2018    Smokeless tobacco: Not on file    Alcohol use No    Drug use: Yes     Types: Marijuana      Comment: several times a day    Sexual activity: Not on file     Review of Systems   Constitution: Positive for weakness. Negative for chills and fever.   HENT: Negative for hoarse voice and sore throat.    Cardiovascular: Positive for chest pain, dyspnea on exertion, leg swelling and near-syncope. Negative for claudication, cyanosis, irregular heartbeat, orthopnea, palpitations, paroxysmal nocturnal dyspnea and syncope.   Respiratory: Positive for shortness of breath. Negative for cough and hemoptysis.    Musculoskeletal: Negative for back pain, joint pain and joint swelling.   Gastrointestinal: Positive for bloating and vomiting. Negative for abdominal pain, constipation, diarrhea, hematochezia, melena and nausea.   Genitourinary: Negative for dysuria, hematuria and incomplete emptying.   Neurological: Positive for dizziness and light-headedness. Negative for headaches.   Psychiatric/Behavioral: Negative for altered mental status, depression and suicidal ideas. The patient is not nervous/anxious.      Objective:     Vital Signs (Most Recent):  Temp: 97.7 °F (36.5 °C) (07/17/18 0941)  Pulse: 100 (07/17/18 1024)  Resp: 18 (07/17/18 0941)  BP: 120/77 (07/17/18 1024)  SpO2: 99 % (07/17/18 1024) Vital Signs (24h Range):  Temp:  [97.7 °F  (36.5 °C)] 97.7 °F (36.5 °C)  Pulse:  [100-105] 100  Resp:  [18] 18  SpO2:  [96 %-99 %] 99 %  BP: (101-120)/(67-77) 120/77     Weight: 95.3 kg (210 lb)  Body mass index is 27.71 kg/m².    SpO2: 99 %       No intake or output data in the 24 hours ending 07/17/18 1122    Lines/Drains/Airways     Peripheral Intravenous Line                 Peripheral IV - Single Lumen 07/17/18 1026 Right Forearm less than 1 day                Physical Exam   Constitutional: He is oriented to person, place, and time. He appears well-developed and well-nourished. No distress.   HENT:   Head: Normocephalic and atraumatic.   Mouth/Throat: Oropharynx is clear and moist. No oropharyngeal exudate.   Eyes: Conjunctivae and EOM are normal. Pupils are equal, round, and reactive to light. Right eye exhibits no discharge. Left eye exhibits no discharge. No scleral icterus.   Neck: Normal range of motion. Neck supple. JVD present. No tracheal deviation present. No thyromegaly present.   Cardiovascular: Normal rate, regular rhythm and intact distal pulses.  Exam reveals gallop (S3 gallop). Exam reveals no friction rub.    No murmur heard.  Pulmonary/Chest: Effort normal. No respiratory distress. He has no wheezes. He has rales.   Abdominal: Soft. Bowel sounds are normal. He exhibits distension. He exhibits no mass. There is no tenderness. There is no rebound and no guarding.   Musculoskeletal: Normal range of motion. He exhibits edema (3+ BL LE edema up to thighs).   Lymphadenopathy:     He has no cervical adenopathy.   Neurological: He is alert and oriented to person, place, and time. No cranial nerve deficit.   Skin: Skin is warm and dry. He is not diaphoretic.   Psychiatric: He has a normal mood and affect. His behavior is normal. Judgment and thought content normal.   Nursing note and vitals reviewed.      Significant Labs:   CMP   Recent Labs  Lab 07/17/18  1035      K 3.7   CL 98   CO2 31*   GLU 84   BUN 14   CREATININE 0.9   CALCIUM 8.7    PROT 5.6*   ALBUMIN 2.5*   BILITOT 0.9   ALKPHOS 191*   AST 26   ALT 20   ANIONGAP 7*   ESTGFRAFRICA >60.0   EGFRNONAA >60.0   , CBC   Recent Labs  Lab 07/17/18  1035   WBC 5.02   HGB 14.1   HCT 44.1      , INR No results for input(s): INR, PROTIME in the last 48 hours. and Troponin   Recent Labs  Lab 07/17/18  1035   TROPONINI 0.023       Significant Imaging: CXR (7/17): Sternotomy.  Cardiomegaly.  Minimal parenchymal scarring.  Lobar consolidation.  Osseous thorax intact.     ECG: Sinus tachycardia, incomplete RBBB

## 2018-07-17 NOTE — NURSING TRANSFER
Nursing Transfer Note      7/17/2018     Transfer TO SICU [id number] From [id number]     Transfer via bed    Transfer with cardiac monitoring    Transported by ***    Medicines sent: ***    Chart send with patient: {YES (DEF)/NO:[id number]}    Notified: {NOTIFIED:30355}    Patient reassessed at: *** (date, time)    Upon arrival to floor: {IP NSG TRANSFER ARRIVAL [facility]:[id number]}

## 2018-07-17 NOTE — ASSESSMENT & PLAN NOTE
- Admit patient to ICU for close monitoring  - L/RHC with EMBx today, then give 1 gram IV Solu-Medrol  - Check DSA's - will try to obtain donor from [facility] or [facility]   Abhi one filling pressures determined  - Check Prograf level, goal level needs to be 5-10  - Continue Plavix for h/o PCI's and add ASA 81 mg qd  - Will need to add statin this admit

## 2018-07-17 NOTE — CONSULTS
"[facility]  Interventional Cardiology  Consult Note    Patient Name: Jamar Smiley  MRN: [id number]  Admission Date: 7/17/2018  Hospital Length of Stay: 0 days  Code Status: Full Code   Attending Provider: Joanne Abdi MD  Consulting Provider: Ny Raya MD  Primary Care Physician: Primary Doctor No  Principal Problem:Heart transplant failure and rejection    Patient information was obtained from patient and past medical records.     Inpatient consult to Interventional Cardiology  Consult performed by: NY RAYA  Consult ordered by: PIERRE CHURCH        Subjective:     Chief Complaint:  LE edema     HPI:  28 y/o male with PMH of HTN, HLD, heart transplant in 2010 ([facility]), CAD s/p PCI to LAD  (2 overlapping JOSE) and RPL (1 JOSE) in 6/2016, HFrEF (EF 25-30% with depressed RV function and elevated filling pressures in 6/2016) and chronic rejection of a heart transplant who presented to a cardiologist in [place], [place] to establish care (Dr. Jaeger) today with LE swelling. His has LE edema, N/V and CORTEZ for two months. He reports CORTEZ to four blocks as well as sharp CP that lasts seconds during exertion. He admits to medical nonadherence to immunosuppressive therapy because of "working, staying busy". The patient reports non-adherence to his immunosuppressants for about five years. He denies smoking tobacco and EtOH use; however, he smokes marijuana. The patient reports orthopnea and cough when lying flat but denies PND. BNP noted to be 504. Troponin normal at 0.023. CXR with cardiomegaly. ECG c/w ST, RAD, iRBBB with  BPM. He has an allergy to contrast. The patient last ate at 6A. His EF 45-50% in 2/2018. The patient and his family say he has stents placed in the right groin because "they couldn't get in" for a C.     TTE today    1 - Post-cardiac transplantation study.     2 - Severely depressed left ventricular systolic function (EF 25-30%).     3 - Upper limit of normal " left ventricular enlargement.     4 - Right ventricular enlargement with not well seen systolic function.     5 - The estimated PA systolic pressure is 35 mmHg. Septal flattening consistent wtih RV pressure and volume overload, PA pressure is underestimated.     6 - Trivial to mild mitral regurgitation.     7 - Trivial to mild tricuspid regurgitation.     8 - Trivial pulmonic regurgitation.     9 - Increased central venous pressure.     Home medications:  Lisinopril 5 mg qD  Lasix 40 mg qD  Spironolactone 25 mg qD  Plavix 75 mg qD    Past Medical History:   Diagnosis Date    Heart transplanted     Hypertension        Past Surgical History:   Procedure Laterality Date    CARDIAC SURGERY      HEART TRANSPLANT         Review of patient's allergies indicates:  No Known Allergies    PTA Medications   Medication Sig    clopidogrel (PLAVIX) 75 mg tablet Take 75 mg by mouth once daily.    furosemide (LASIX) 40 MG tablet Take 40 mg by mouth 2 (two) times daily.    lisinopril (PRINIVIL,ZESTRIL) 5 MG tablet Take 5 mg by mouth once daily.    spironolactone (ALDACTONE) 25 MG tablet Take 25 mg by mouth once daily.    tacrolimus (PROGRAF) 1 MG Cap Take by mouth every 12 (twelve) hours.     Family History     Problem Relation (Age of Onset)    Diabetes Mother    Hypertension Mother, Father        Social History Main Topics    Smoking status: Former Smoker     Quit date: 4/17/2018    Smokeless tobacco: Not on file    Alcohol use No    Drug use: Yes     Types: Marijuana      Comment: several times a day    Sexual activity: Not on file     Review of Systems   Constitution: Negative for chills and fever.   HENT: Negative for ear discharge.    Eyes: Negative for pain and visual disturbance.   Cardiovascular: Positive for chest pain, dyspnea on exertion and leg swelling. Negative for irregular heartbeat, orthopnea, palpitations, paroxysmal nocturnal dyspnea and syncope.   Respiratory: Negative for hemoptysis, shortness of  breath and wheezing.    Skin: Negative for rash and suspicious lesions.   Musculoskeletal: Positive for joint pain. Negative for muscle weakness.   Gastrointestinal: Positive for nausea and vomiting. Negative for abdominal pain, diarrhea, hematemesis, hematochezia and melena.   Genitourinary: Negative for dysuria and frequency.   Neurological: Negative for focal weakness, headaches and tremors.   Psychiatric/Behavioral: Negative for altered mental status, suicidal ideas and thoughts of violence.     Objective:     Vital Signs (Most Recent):  Temp: 97.2 °F (36.2 °C) (07/17/18 1226)  Pulse: 100 (07/17/18 1226)  Resp: 16 (07/17/18 1226)  BP: 102/61 (07/17/18 1226)  SpO2: 100 % (07/17/18 1226) Vital Signs (24h Range):  Temp:  [97.2 °F (36.2 °C)-97.7 °F (36.5 °C)] 97.2 °F (36.2 °C)  Pulse:  [] 100  Resp:  [16-18] 16  SpO2:  [96 %-100 %] 100 %  BP: (101-120)/(61-77) 102/61     Weight: 94.2 kg (207 lb 10.8 oz)  Body mass index is 27.4 kg/m².    SpO2: 100 %  O2 Device (Oxygen Therapy): room air    No intake or output data in the 24 hours ending 07/17/18 1244    Lines/Drains/Airways     Peripheral Intravenous Line                 Peripheral IV - Single Lumen 07/17/18 1026 Right Forearm less than 1 day                Physical Exam   Constitutional: He is oriented to person, place, and time. He appears well-developed and well-nourished.   HENT:   Head: Normocephalic and atraumatic.   Eyes: EOM are normal.   Neck: Carotid bruit is not present.   Cardiovascular: Normal rate and regular rhythm.  Exam reveals no gallop and no friction rub.    Pulses:       Femoral pulses are 2+ on the left side.       Dorsalis pedis pulses are 1+ on the right side, and 1+ on the left side.        Posterior tibial pulses are 1+ on the right side, and 1+ on the left side.   Dopplerable right and left radial artery as well as Dopplerable bilateral popliteal and right femoral arteries. 1+ bilaterally LE edema   Pulmonary/Chest: Effort normal. No  stridor. He has wheezes. He has no rales.   Abdominal: Soft. Bowel sounds are normal. There is no rebound and no guarding.   Musculoskeletal: He exhibits no edema.   Neurological: He is alert and oriented to person, place, and time. No cranial nerve deficit.   Skin: Skin is warm and dry.   Right groin scar.   Psychiatric: He has a normal mood and affect. His behavior is normal.       Significant Labs:   CMP   Recent Labs  Lab 07/17/18  1035      K 3.7   CL 98   CO2 31*   GLU 84   BUN 14   CREATININE 0.9   CALCIUM 8.7   PROT 5.6*   ALBUMIN 2.5*   BILITOT 0.9   ALKPHOS 191*   AST 26   ALT 20   ANIONGAP 7*   ESTGFRAFRICA >60.0   EGFRNONAA >60.0   , CBC   Recent Labs  Lab 07/17/18  1035   WBC 5.02   HGB 14.1   HCT 44.1      , INR   Recent Labs  Lab 07/17/18  1308   INR 1.0   , Lipid Panel No results for input(s): CHOL, HDL, LDLCALC, TRIG, CHOLHDL in the last 48 hours. and Troponin   Recent Labs  Lab 07/17/18  1035   TROPONINI 0.023         Assessment and Plan:     * Heart transplant failure and rejection    - LHC +/- PCI with IVUS; right CFA access; JR4/JL4 4Fr diagnostic and 6Fr PCI  - RHC with biopsy via the right CFV  - HF and rejection management per HTS  - IV methypred and H1/H2 blockers 2/2 dye allergy  - discussed with interventional fellow: 600 mg IV Plavix x1    -The risks, benefits & alternatives of the procedure were explained to the patient.    -The risks of coronary angiography include but are not limited to:  Bleeding, infection, heart rhythm abnormalities, allergic reactions, kidney injury, stroke and death.    -Should stenting be indicated, the patient has agreed to dual anti-platelet therapy for 1-consecutive year with a drug-eluting stent and a minimum of 1-month with the use of a bare metal stent.    -The risks of moderate sedation include hypotension, respiratory depression, arrhythmias, bronchospasm, & death.    -Informed consent was obtained & the patient is agreeable to proceed with  the procedure.  -This patient was discussed with the attending interventional cardiologist who agrees with the above assessment & plan.    Cardiac arrest out of hospital? No    CSHA Clinical Frailty Scale: Mildly frail    Heart failure: yes    Diabetes Mellitus: no    Dialysis: no            VTE Risk Mitigation     None          Thank you for your consult.     Charlie Ordoñez MD  Interventional Cardiology   [facility]

## 2018-07-17 NOTE — CONSULTS
[facility]  Cardiology  Consult Note    Patient Name: Jamar Smiley  MRN: [id number]  Admission Date: 7/17/2018  Hospital Length of Stay: 0 days  Code Status: Full Code   Attending Provider: Joanne Abdi MD   Consulting Provider: Cotl Merritt MD  Primary Care Physician: Primary Doctor No  Principal Problem:Heart transplant failure and rejection    Patient information was obtained from patient and past medical records.     Inpatient consult to Cardiology  Consult performed by: COLT MERRITT  Consult ordered by: WILFRED HELTON  Reason for consult: Hx of heart transplant, CORTEZ / LE edema x 1 month        Subjective:     Chief Complaint:  Hx of heart transplant, CORTEZ / LE edema x 1 month     HPI:   27 y.o M with a PMhx of OHTx in 2010 at [facility] that presents to the ER with 1 month of worsening CORTEZ, LE swelling and abdominal swelling with 2-3 days of associated vomiting.     Patient was initially transplanted at [facility] in 2010 and followed up there until 2015. He has not seen a transplant cardiologist since that time and was off of his immunosupressants following that due to lack of a prescription. His local cardiologist (Dr. Jaeger) started him back on Tacrolimus approximately 1 year ago and started valsartan and aldactone in the last month. Patient reports that the only medications that he takes are Tacrolimus and Aldactone.     Patient's mother called into transplant clinic earlier this week and patient was instructed to go to the local ER and transferred for further care.     Past Medical History:   Diagnosis Date    Heart transplanted     Hypertension        Past Surgical History:   Procedure Laterality Date    CARDIAC SURGERY      HEART TRANSPLANT         Review of patient's allergies indicates:  No Known Allergies    No current facility-administered medications on file prior to encounter.      No current outpatient prescriptions on file prior to encounter.     Family History      Problem Relation (Age of Onset)    Diabetes Mother    Hypertension Mother, Father        Social History Main Topics    Smoking status: Former Smoker     Quit date: 4/17/2018    Smokeless tobacco: Not on file    Alcohol use No    Drug use: Yes     Types: Marijuana      Comment: several times a day    Sexual activity: Not on file     Review of Systems   Constitution: Positive for weakness. Negative for chills and fever.   HENT: Negative for hoarse voice and sore throat.    Cardiovascular: Positive for chest pain, dyspnea on exertion, leg swelling and near-syncope. Negative for claudication, cyanosis, irregular heartbeat, orthopnea, palpitations, paroxysmal nocturnal dyspnea and syncope.   Respiratory: Positive for shortness of breath. Negative for cough and hemoptysis.    Musculoskeletal: Negative for back pain, joint pain and joint swelling.   Gastrointestinal: Positive for bloating and vomiting. Negative for abdominal pain, constipation, diarrhea, hematochezia, melena and nausea.   Genitourinary: Negative for dysuria, hematuria and incomplete emptying.   Neurological: Positive for dizziness and light-headedness. Negative for headaches.   Psychiatric/Behavioral: Negative for altered mental status, depression and suicidal ideas. The patient is not nervous/anxious.      Objective:     Vital Signs (Most Recent):  Temp: 97.7 °F (36.5 °C) (07/17/18 0941)  Pulse: 100 (07/17/18 1024)  Resp: 18 (07/17/18 0941)  BP: 120/77 (07/17/18 1024)  SpO2: 99 % (07/17/18 1024) Vital Signs (24h Range):  Temp:  [97.7 °F (36.5 °C)] 97.7 °F (36.5 °C)  Pulse:  [100-105] 100  Resp:  [18] 18  SpO2:  [96 %-99 %] 99 %  BP: (101-120)/(67-77) 120/77     Weight: 95.3 kg (210 lb)  Body mass index is 27.71 kg/m².    SpO2: 99 %       No intake or output data in the 24 hours ending 07/17/18 1122    Lines/Drains/Airways     Peripheral Intravenous Line                 Peripheral IV - Single Lumen 07/17/18 1026 Right Forearm less than 1 day                 Physical Exam   Constitutional: He is oriented to person, place, and time. He appears well-developed and well-nourished. No distress.   HENT:   Head: Normocephalic and atraumatic.   Mouth/Throat: Oropharynx is clear and moist. No oropharyngeal exudate.   Eyes: Conjunctivae and EOM are normal. Pupils are equal, round, and reactive to light. Right eye exhibits no discharge. Left eye exhibits no discharge. No scleral icterus.   Neck: Normal range of motion. Neck supple. JVD present. No tracheal deviation present. No thyromegaly present.   Cardiovascular: Normal rate, regular rhythm and intact distal pulses.  Exam reveals gallop (S3 gallop). Exam reveals no friction rub.    No murmur heard.  Pulmonary/Chest: Effort normal. No respiratory distress. He has no wheezes. He has rales.   Abdominal: Soft. Bowel sounds are normal. He exhibits distension. He exhibits no mass. There is no tenderness. There is no rebound and no guarding.   Musculoskeletal: Normal range of motion. He exhibits edema (3+ BL LE edema up to thighs).   Lymphadenopathy:     He has no cervical adenopathy.   Neurological: He is alert and oriented to person, place, and time. No cranial nerve deficit.   Skin: Skin is warm and dry. He is not diaphoretic.   Psychiatric: He has a normal mood and affect. His behavior is normal. Judgment and thought content normal.   Nursing note and vitals reviewed.      Significant Labs:   CMP   Recent Labs  Lab 07/17/18  1035      K 3.7   CL 98   CO2 31*   GLU 84   BUN 14   CREATININE 0.9   CALCIUM 8.7   PROT 5.6*   ALBUMIN 2.5*   BILITOT 0.9   ALKPHOS 191*   AST 26   ALT 20   ANIONGAP 7*   ESTGFRAFRICA >60.0   EGFRNONAA >60.0   , CBC   Recent Labs  Lab 07/17/18  1035   WBC 5.02   HGB 14.1   HCT 44.1      , INR No results for input(s): INR, PROTIME in the last 48 hours. and Troponin   Recent Labs  Lab 07/17/18  1035   TROPONINI 0.023       Significant Imaging: CXR (7/17): Sternotomy.  Cardiomegaly.   Minimal parenchymal scarring.  Lobar consolidation.  Osseous thorax intact.     ECG: Sinus tachycardia, incomplete RBBB      Assessment and Plan:     * Heart transplant failure and rejection    27 y.o M with a PMHx of OHTx in 2010 that presents with 1 month worsening of CORTEZ, LE edema, abdominal swelling with associated vomiting for the last 2-3 days. He initially was transplanted at [facility] in 2010 and followed up with their heart transplant group until 2015. Stopped taking his immunosupressants at that time as he did not have a prescription. Recently restarted back on Tacrolimus by a cardiologist in his hometown for the last year. EF on bedside echo was 10% with moderate RV dysfunction. Concern for rejection of transplanted heart.    Plan  - Solumedrol 1g x 1  - Admit to HTS, place in CCU  - 2D echo w/ CFD    Plan discussed with Dr. Abdi            VTE Risk Mitigation     None          Thank you for your consult. I will sign off. Please contact us if you have any additional questions.    Gamaliel Mcadams MD  Cardiology   [facility]

## 2018-07-18 PROBLEM — T86.21: Status: ACTIVE | Noted: 2018-07-18

## 2018-07-18 PROBLEM — I50.43 ACUTE ON CHRONIC COMBINED SYSTOLIC AND DIASTOLIC HEART FAILURE: Status: ACTIVE | Noted: 2018-07-18

## 2018-07-18 PROBLEM — I25.811 ATHEROSCLEROSIS OF NATIVE CORONARY ARTERY OF TRANSPLANTED HEART WITHOUT ANGINA PECTORIS: Status: ACTIVE | Noted: 2018-07-18

## 2018-07-18 PROBLEM — R06.02 SOB (SHORTNESS OF BREATH): Status: RESOLVED | Noted: 2018-07-17 | Resolved: 2018-07-18

## 2018-07-18 PROBLEM — R73.9 HYPERGLYCEMIA: Status: ACTIVE | Noted: 2018-07-18

## 2018-07-18 LAB
ALBUMIN SERPL BCP-MCNC: 2.4 G/DL
ALP SERPL-CCNC: 183 U/L
ALT SERPL W/O P-5'-P-CCNC: 18 U/L
ANION GAP SERPL CALC-SCNC: 11 MMOL/L
AST SERPL-CCNC: 21 U/L
BASOPHILS # BLD AUTO: 0.01 K/UL
BASOPHILS NFR BLD: 0.2 %
BILIRUB SERPL-MCNC: 1.5 MG/DL
BUN SERPL-MCNC: 15 MG/DL
CALCIUM SERPL-MCNC: 8.6 MG/DL
CHLORIDE SERPL-SCNC: 99 MMOL/L
CHOLEST SERPL-MCNC: 174 MG/DL
CHOLEST/HDLC SERPL: 4 {RATIO}
CO2 SERPL-SCNC: 28 MMOL/L
CREAT SERPL-MCNC: 0.9 MG/DL
DIFFERENTIAL METHOD: ABNORMAL
EOSINOPHIL # BLD AUTO: 0 K/UL
EOSINOPHIL NFR BLD: 0.2 %
ERYTHROCYTE [DISTWIDTH] IN BLOOD BY AUTOMATED COUNT: 20.7 %
EST. GFR  (AFRICAN AMERICAN): >60 ML/MIN/1.73 M^2
EST. GFR  (NON AFRICAN AMERICAN): >60 ML/MIN/1.73 M^2
GLUCOSE SERPL-MCNC: 222 MG/DL
HCT VFR BLD AUTO: 51.3 %
HDLC SERPL-MCNC: 44 MG/DL
HDLC SERPL: 25.3 %
HGB BLD-MCNC: 15.9 G/DL
HIV1+2 IGG SERPL QL IA.RAPID: NEGATIVE
IMM GRANULOCYTES # BLD AUTO: 0.04 K/UL
IMM GRANULOCYTES NFR BLD AUTO: 1 %
LDLC SERPL CALC-MCNC: 119.2 MG/DL
LYMPHOCYTES # BLD AUTO: 0.3 K/UL
LYMPHOCYTES NFR BLD: 6.1 %
MAGNESIUM SERPL-MCNC: 1.7 MG/DL
MCH RBC QN AUTO: 26.9 PG
MCHC RBC AUTO-ENTMCNC: 31 G/DL
MCV RBC AUTO: 87 FL
MONOCYTES # BLD AUTO: 0.1 K/UL
MONOCYTES NFR BLD: 2 %
NEUTROPHILS # BLD AUTO: 3.7 K/UL
NEUTROPHILS NFR BLD: 90.5 %
NONHDLC SERPL-MCNC: 130 MG/DL
NRBC BLD-RTO: 0 /100 WBC
PLATELET # BLD AUTO: 125 K/UL
PLATELET BLD QL SMEAR: ABNORMAL
PMV BLD AUTO: 10.9 FL
POCT GLUCOSE: 176 MG/DL (ref 70–110)
POCT GLUCOSE: 191 MG/DL (ref 70–110)
POCT GLUCOSE: 247 MG/DL (ref 70–110)
POTASSIUM SERPL-SCNC: 3.8 MMOL/L
PROT SERPL-MCNC: 5.5 G/DL
RBC # BLD AUTO: 5.91 M/UL
SODIUM SERPL-SCNC: 138 MMOL/L
TACROLIMUS BLD-MCNC: 2.3 NG/ML
TACROLIMUS BLD-MCNC: 6.1 NG/ML
TRIGL SERPL-MCNC: 54 MG/DL
WBC # BLD AUTO: 4.1 K/UL

## 2018-07-18 PROCEDURE — 80053 COMPREHEN METABOLIC PANEL: CPT

## 2018-07-18 PROCEDURE — 25000003 PHARM REV CODE 250: Performed by: NURSE PRACTITIONER

## 2018-07-18 PROCEDURE — S0028 INJECTION, FAMOTIDINE, 20 MG: HCPCS | Performed by: INTERNAL MEDICINE

## 2018-07-18 PROCEDURE — 83735 ASSAY OF MAGNESIUM: CPT

## 2018-07-18 PROCEDURE — 25000003 PHARM REV CODE 250: Performed by: INTERNAL MEDICINE

## 2018-07-18 PROCEDURE — 36415 COLL VENOUS BLD VENIPUNCTURE: CPT

## 2018-07-18 PROCEDURE — 20000000 HC ICU ROOM

## 2018-07-18 PROCEDURE — 63600175 PHARM REV CODE 636 W HCPCS: Performed by: NURSE PRACTITIONER

## 2018-07-18 PROCEDURE — 85025 COMPLETE CBC W/AUTO DIFF WBC: CPT

## 2018-07-18 PROCEDURE — 86644 CMV ANTIBODY: CPT

## 2018-07-18 PROCEDURE — 94761 N-INVAS EAR/PLS OXIMETRY MLT: CPT

## 2018-07-18 PROCEDURE — 93970 EXTREMITY STUDY: CPT | Mod: 26,,, | Performed by: INTERNAL MEDICINE

## 2018-07-18 PROCEDURE — 63600175 PHARM REV CODE 636 W HCPCS: Performed by: INTERNAL MEDICINE

## 2018-07-18 PROCEDURE — 99233 SBSQ HOSP IP/OBS HIGH 50: CPT | Mod: ,,, | Performed by: INTERNAL MEDICINE

## 2018-07-18 PROCEDURE — 80061 LIPID PANEL: CPT

## 2018-07-18 PROCEDURE — 99291 CRITICAL CARE FIRST HOUR: CPT | Mod: ,,, | Performed by: NURSE PRACTITIONER

## 2018-07-18 PROCEDURE — 80197 ASSAY OF TACROLIMUS: CPT

## 2018-07-18 PROCEDURE — 93970 EXTREMITY STUDY: CPT

## 2018-07-18 RX ORDER — SULFAMETHOXAZOLE AND TRIMETHOPRIM 400; 80 MG/1; MG/1
1 TABLET ORAL DAILY
Status: DISCONTINUED | OUTPATIENT
Start: 2018-07-18 | End: 2018-07-23 | Stop reason: HOSPADM

## 2018-07-18 RX ORDER — TRAMADOL HYDROCHLORIDE 50 MG/1
50 TABLET ORAL EVERY 6 HOURS PRN
Status: DISCONTINUED | OUTPATIENT
Start: 2018-07-18 | End: 2018-07-23 | Stop reason: HOSPADM

## 2018-07-18 RX ORDER — IBUPROFEN 200 MG
24 TABLET ORAL
Status: DISCONTINUED | OUTPATIENT
Start: 2018-07-18 | End: 2018-07-23 | Stop reason: HOSPADM

## 2018-07-18 RX ORDER — ATORVASTATIN CALCIUM 20 MG/1
80 TABLET, FILM COATED ORAL DAILY
Status: DISCONTINUED | OUTPATIENT
Start: 2018-07-18 | End: 2018-07-23 | Stop reason: HOSPADM

## 2018-07-18 RX ORDER — IBUPROFEN 200 MG
16 TABLET ORAL
Status: DISCONTINUED | OUTPATIENT
Start: 2018-07-18 | End: 2018-07-23 | Stop reason: HOSPADM

## 2018-07-18 RX ORDER — GLUCAGON 1 MG
1 KIT INJECTION
Status: DISCONTINUED | OUTPATIENT
Start: 2018-07-18 | End: 2018-07-23 | Stop reason: HOSPADM

## 2018-07-18 RX ORDER — NYSTATIN 100000 [USP'U]/ML
500000 SUSPENSION ORAL
Status: DISCONTINUED | OUTPATIENT
Start: 2018-07-18 | End: 2018-07-23 | Stop reason: HOSPADM

## 2018-07-18 RX ORDER — INSULIN ASPART 100 [IU]/ML
1-10 INJECTION, SOLUTION INTRAVENOUS; SUBCUTANEOUS
Status: DISCONTINUED | OUTPATIENT
Start: 2018-07-18 | End: 2018-07-23 | Stop reason: HOSPADM

## 2018-07-18 RX ORDER — PREDNISONE 10 MG/1
20 TABLET ORAL DAILY
Status: DISCONTINUED | OUTPATIENT
Start: 2018-07-20 | End: 2018-07-23 | Stop reason: HOSPADM

## 2018-07-18 RX ADMIN — DIPHENHYDRAMINE HYDROCHLORIDE 50 MG: 50 INJECTION, SOLUTION INTRAMUSCULAR; INTRAVENOUS at 12:07

## 2018-07-18 RX ADMIN — INSULIN ASPART 2 UNITS: 100 INJECTION, SOLUTION INTRAVENOUS; SUBCUTANEOUS at 01:07

## 2018-07-18 RX ADMIN — DIPHENHYDRAMINE HYDROCHLORIDE 50 MG: 50 INJECTION, SOLUTION INTRAMUSCULAR; INTRAVENOUS at 06:07

## 2018-07-18 RX ADMIN — TACROLIMUS 1 MG: 1 CAPSULE ORAL at 08:07

## 2018-07-18 RX ADMIN — NYSTATIN 500000 UNITS: 500000 SUSPENSION ORAL at 12:07

## 2018-07-18 RX ADMIN — TRAMADOL HYDROCHLORIDE 50 MG: 50 TABLET, COATED ORAL at 09:07

## 2018-07-18 RX ADMIN — TRAMADOL HYDROCHLORIDE 50 MG: 50 TABLET, COATED ORAL at 06:07

## 2018-07-18 RX ADMIN — METHYLPREDNISOLONE SODIUM SUCCINATE 125 MG: 125 INJECTION, POWDER, FOR SOLUTION INTRAMUSCULAR; INTRAVENOUS at 12:07

## 2018-07-18 RX ADMIN — ATORVASTATIN CALCIUM 80 MG: 20 TABLET, FILM COATED ORAL at 08:07

## 2018-07-18 RX ADMIN — DEXTROSE: 50 INJECTION, SOLUTION INTRAVENOUS at 11:07

## 2018-07-18 RX ADMIN — ASPIRIN 81 MG: 81 TABLET, COATED ORAL at 08:07

## 2018-07-18 RX ADMIN — TACROLIMUS 1 MG: 1 CAPSULE ORAL at 06:07

## 2018-07-18 RX ADMIN — INSULIN ASPART 1 UNITS: 100 INJECTION, SOLUTION INTRAVENOUS; SUBCUTANEOUS at 09:07

## 2018-07-18 RX ADMIN — FAMOTIDINE 20 MG: 10 INJECTION, SOLUTION INTRAVENOUS at 08:07

## 2018-07-18 RX ADMIN — NYSTATIN 500000 UNITS: 500000 SUSPENSION ORAL at 06:07

## 2018-07-18 RX ADMIN — SULFAMETHOXAZOLE AND TRIMETHOPRIM 1 TABLET: 400; 80 TABLET ORAL at 10:07

## 2018-07-18 RX ADMIN — CLOPIDOGREL 75 MG: 75 TABLET, FILM COATED ORAL at 08:07

## 2018-07-18 RX ADMIN — METHYLPREDNISOLONE SODIUM SUCCINATE 125 MG: 125 INJECTION, POWDER, FOR SOLUTION INTRAMUSCULAR; INTRAVENOUS at 06:07

## 2018-07-18 RX ADMIN — NYSTATIN 500000 UNITS: 500000 SUSPENSION ORAL at 09:07

## 2018-07-18 RX ADMIN — FAMOTIDINE 20 MG: 10 INJECTION, SOLUTION INTRAVENOUS at 09:07

## 2018-07-18 NOTE — PROCEDURES
"    Post Cath Note  Referring Physician: Jaja Chang MD  Procedure: INSERTION, CATHETER, RIGHT HEART (N/A), Coronary angiography (N/A)       Access: Right CFA/ CFV, left IJV    Occluded right EIV, left CFV, and right IJV  CAV including mLAD 90% ISR  PCWP = 40 mmHg  Mean RA = 30 mmHg  Suman CI = 2.24    See full report for further details    Intervention:   None   Closure device: Manual pressure    Post Cath Exam:   /78 (BP Location: Left arm, Patient Position: Lying)   Pulse 101   Temp 97.7 °F (36.5 °C) (Oral)   Resp (!) 37   Ht 6' 1" (1.854 m)   Wt 96.8 kg (213 lb 6.5 oz)   SpO2 97%   BMI 28.16 kg/m²   No unusual pain, hematoma, thrill or bruit at vascular access site.  Distal pulse present without signs of ischemia.    Recommendations:   - Routine post-cath care  - Management per HTS  "

## 2018-07-18 NOTE — ASSESSMENT & PLAN NOTE
- L/RHC done 7/17 showed significant CAD/CAV, no interventions done. RA: 30, PA: 62/40, W: 40, CO/CI 4.85/2.24. Unable to obtain EMBx via IJ approach - will need to do groin approach in the future for EMBx  - Given 1 gram IV Solu Medrol yesterday, will give 2 more doses, then start Prednisone wean  - DSA pending - will try to obtain donor antibodies from [facility] or [facility]  - Add OI prophylaxis given high dose steroids with Nystatin, Bactrim. Check CMV IgG to see if Valcyte needed  - Continue diuresis with Lasix at 10 mg/hr  - On monotherapy with Prograf (goal level 5-10). Was also on Rapamune with Prograf in the past, but stopped taking it when he couldn't get it for free  - Continue Plavix and ASA for h/o PCI's  - Check lipids and add high dose statin  - Transfer to step down

## 2018-07-18 NOTE — PROGRESS NOTES
Admit Note     Met with patient to assess needs. Patient is a 27 y.o. single male, admitted for Shortness of breath [R06.02], Chronic rejection of heart transplant [T86.21], History of heart transplant [Z94.1], and Heart transplant failure and rejection [T86.22, T86.21].     Patient admitted from  ED on 7/17/2018 .  At this time, patient presents as alert and oriented x 4, pleasant, communicative, cooperative and asking and answering questions appropriately.  At this time, patients caregivers are not present.       Household/Family Systems     Patient resides alone at:     [address]  [place] [address]   (approximately 4 [place]/2 hours from [facility], close to [place])      Pt cell - [phone]   Pt home - [phone]   Pt's mother Jihan Sheikhcil - [phone]  Don Mancil - [phone]    Support system includes Mom, dad, and brother.  Patient does have dependents that are in need of being cared for. Patient's 3 children are being cared for by their mothers. Pt reports he has a 10 year old son, 2 year old son, and a 1 year old dtr. Pt reports 2 of his children live with their mother in [place] and visit him every other weekend, and one of his children lives in [place] close to .      Patients primary caregiver is self.     During admission, patient's caregiver plans to stay in a local hotel.  Confirmed patient and patients caregivers do have access to reliable transportation.    Cognitive Status/Learning     Patient reports reading ability as 12th grade and states patient does have difficulty with seeing, comprehension and memory.    Needed: No.   Highest education level: Pt completed 11th grade.    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 Pt reports he plays more of a supervisory role, and does not do much of the physical labor.     Adherence     Patient reports  a low level of adherence to patients health care regimen.  Adherence counseling and education provided. Patient verbalizes understanding.    Substance Use    Patient reports the following substance usage.    Tobacco: Pt reports past tobacco use. Pt reports he was smoking approximately 2 cigarettes a day, and stopped approximately 1 month ago.  Alcohol: Pt reports no current use. Pt reports he stopped all alcohol use in 2017.  Illicit Drugs/Non-prescribed Medications: Pt reports current daily marijuana use. Pt reports smoking marijuana helps pt with appetite, sleep, and anxiety.  Patient states clear understanding of the potential impact of substance use.  Substance abstinence/cessation counseling, education and resources provided and reviewed.     Services Utilizing/ADLS    Infusion Service: Prior to admission, patient utilizing? no  Home Health: Prior to admission, patient utilizing? no  DME: Prior to admission, no  Pulmonary/Cardiac Rehab: Prior to admission, no  Dialysis:  Prior to admission, no  Transplant Specialty Pharmacy:  Prior to admission, no.    Prior to admission, patient reports patient was independent with ADLS and was driving.  Patient reports patient is not able to care for self at this time due to compromised medical condition (as documented in medical record) and physical weakness.  Patient indicates a willingness to care for self once medically cleared to do so.    Insurance/Medications    Insured by   Payor/Plan Subscr  Sex Relation Sub. Ins. ID Effective Group Num   1. MEDICAID - * PAULY GRAHAMO 1990 Male  [id number] [address] [address]      Primary Insurance (for UNOS reporting): Public Insurance - Medicaid  Secondary Insurance (for UNOS reporting): None    Patient reports patient is able to obtain and afford medications at this time and at time of discharge.    Living Will/Healthcare Power of     Patient states patient  does not have a LW and/or HCPA.   provided education regarding LW and HCPA and the completion of forms.    Coping/Mental Health    Patient reports some difficulty coping. Patient indicates mental health difficulties. Pt reports feelings of stress, anxiety, and depression. SW providing extensive emotional support and counseling.     Discharge Planning    At time of discharge, patient plans to return to patient's home under the care of self and parents.  Patients mother and father will transport patient.  Per rounds today, expected discharge date has not been medically determined at this time. Patient and patients caregiver  verbalize understanding and are involved in treatment planning and discharge process.    Additional Concerns     providing ongoing psychosocial support, education, resources and d/c planning as needed.  SW remains available.  provided resource list, patient choice, psychosocial and supportive counseling, resources, education, assistance and discharge planning with patient and caregiver involvement, ongoing SW availability and services as appropriate. Patient denies additional needs and/or concerns at this time. Patient verbalizes understanding and agreement with information reviewed, social work availability, and how to access available resources as needed.

## 2018-07-18 NOTE — NURSING
Rounds Report: Attended interdisciplinary rounds this afternoon with the transplant team including SW, physicians, fellows,  mid-level providers, and transplant coordinators.  Discussed plan of care which is detailed in Heart Transplant note.

## 2018-07-18 NOTE — SUBJECTIVE & OBJECTIVE
Interval History: Feels better, Edema improved some. Net -ve > 4.1L since admit.    Continuous Infusions:   furosemide (LASIX) 2 mg/mL infusion (non-titrating) 10 mg/hr (07/18/18 0700)     Scheduled Meds:   aspirin  81 mg Oral Daily    atorvastatin  80 mg Oral Daily    clopidogrel  75 mg Oral Daily    famotidine (PF)  20 mg Intravenous BID    tacrolimus  1 mg Oral BID     PRN Meds:dextrose 50%, dextrose 50%, glucagon (human recombinant), glucose, glucose, insulin aspart U-100    Review of patient's allergies indicates:   Allergen Reactions    Contrast media      Objective:     Vital Signs (Most Recent):  Temp: 97.8 °F (36.6 °C) (07/18/18 0700)  Pulse: 99 (07/18/18 0730)  Resp: 20 (07/18/18 0730)  BP: 97/71 (07/18/18 0700)  SpO2: 97 % (07/18/18 0730) Vital Signs (24h Range):  Temp:  [97.2 °F (36.2 °C)-98.2 °F (36.8 °C)] 97.8 °F (36.6 °C)  Pulse:  [] 99  Resp:  [15-44] 20  SpO2:  [93 %-100 %] 97 %  BP: ()/(50-87) 97/71     Patient Vitals for the past 72 hrs (Last 3 readings):   Weight   07/18/18 0400 93.8 kg (206 lb 12.7 oz)   07/17/18 1400 96.8 kg (213 lb 6.5 oz)   07/17/18 1203 94.2 kg (207 lb 10.8 oz)     Body mass index is 27.28 kg/m².      Intake/Output Summary (Last 24 hours) at 07/18/18 0804  Last data filed at 07/18/18 0500   Gross per 24 hour   Intake              720 ml   Output             4355 ml   Net            -3635 ml       Hemodynamic Parameters:       Telemetry: SR    Physical Exam   Constitutional: He is oriented to person, place, and time. He appears well-developed and well-nourished.   HENT:   Head: Normocephalic and atraumatic.   Eyes: Conjunctivae and EOM are normal. Pupils are equal, round, and reactive to light.   Neck: Normal range of motion. Neck supple.   Unable to visualize JVP. No JVD.   Cardiovascular: Normal rate and regular rhythm.    + S4   Pulmonary/Chest: Effort normal and breath sounds normal.   Abdominal: Soft. Bowel sounds are normal.   Musculoskeletal: Normal  range of motion.   3-4+ edema thighs down   Neurological: He is alert and oriented to person, place, and time.   Skin: Skin is warm and dry. Capillary refill takes less than 2 seconds.   Psychiatric: He has a normal mood and affect. His behavior is normal. Judgment and thought content normal.       Significant Labs:  CBC:    Recent Labs  Lab 07/17/18  1035 07/18/18  0334   WBC 5.02 4.10   RBC 5.15 5.91   HGB 14.1 15.9   HCT 44.1 51.3    125*   MCV 86 87   MCH 27.4 26.9*   MCHC 32.0 31.0*     BNP:    Recent Labs  Lab 07/17/18  1035   *     CMP:    Recent Labs  Lab 07/17/18  1035 07/18/18  0620   GLU 84 222*   CALCIUM 8.7 8.6*   ALBUMIN 2.5* 2.4*   PROT 5.6* 5.5*    138   K 3.7 3.8   CO2 31* 28   CL 98 99   BUN 14 15   CREATININE 0.9 0.9   ALKPHOS 191* 183*   ALT 20 18   AST 26 21   BILITOT 0.9 1.5*      Coagulation:     Recent Labs  Lab 07/17/18  1308   INR 1.0     LDH:  No results for input(s): LDH in the last 72 hours.  Microbiology:  Microbiology Results (last 7 days)     ** No results found for the last 168 hours. **          I have reviewed all pertinent labs within the past 24 hours.    Estimated Creatinine Clearance: 136.9 mL/min (based on SCr of 0.9 mg/dL).    Diagnostic Results:  I have reviewed all pertinent imaging results/findings within the past 24 hours.

## 2018-07-18 NOTE — PROGRESS NOTES
[facility]  Heart Transplant  Progress Note    Patient Name: Jamar Smiley  MRN: [id number]  Admission Date: 7/17/2018  Hospital Length of Stay: 1 days  Attending Physician: Jaja Chang MD  Primary Care Provider: Primary Doctor No  Principal Problem:Heart transplant failure and rejection    Subjective:     Interval History: Feels better, Edema improved some. Net -ve > 4.1L since admit.    Continuous Infusions:   furosemide (LASIX) 2 mg/mL infusion (non-titrating) 10 mg/hr (07/18/18 0700)     Scheduled Meds:   aspirin  81 mg Oral Daily    atorvastatin  80 mg Oral Daily    clopidogrel  75 mg Oral Daily    famotidine (PF)  20 mg Intravenous BID    tacrolimus  1 mg Oral BID     PRN Meds:dextrose 50%, dextrose 50%, glucagon (human recombinant), glucose, glucose, insulin aspart U-100    Review of patient's allergies indicates:   Allergen Reactions    Contrast media      Objective:     Vital Signs (Most Recent):  Temp: 97.8 °F (36.6 °C) (07/18/18 0700)  Pulse: 99 (07/18/18 0730)  Resp: 20 (07/18/18 0730)  BP: 97/71 (07/18/18 0700)  SpO2: 97 % (07/18/18 0730) Vital Signs (24h Range):  Temp:  [97.2 °F (36.2 °C)-98.2 °F (36.8 °C)] 97.8 °F (36.6 °C)  Pulse:  [] 99  Resp:  [15-44] 20  SpO2:  [93 %-100 %] 97 %  BP: ()/(50-87) 97/71     Patient Vitals for the past 72 hrs (Last 3 readings):   Weight   07/18/18 0400 93.8 kg (206 lb 12.7 oz)   07/17/18 1400 96.8 kg (213 lb 6.5 oz)   07/17/18 1203 94.2 kg (207 lb 10.8 oz)     Body mass index is 27.28 kg/m².      Intake/Output Summary (Last 24 hours) at 07/18/18 0804  Last data filed at 07/18/18 0500   Gross per 24 hour   Intake              720 ml   Output             4355 ml   Net            -3635 ml       Hemodynamic Parameters:       Telemetry: SR    Physical Exam   Constitutional: He is oriented to person, place, and time. He appears well-developed and well-nourished.   HENT:   Head: Normocephalic and atraumatic.   Eyes: Conjunctivae  and EOM are normal. Pupils are equal, round, and reactive to light.   Neck: Normal range of motion. Neck supple.   Unable to visualize JVP. No JVD.   Cardiovascular: Normal rate and regular rhythm.    + S4   Pulmonary/Chest: Effort normal and breath sounds normal.   Abdominal: Soft. Bowel sounds are normal.   Musculoskeletal: Normal range of motion.   3-4+ edema thighs down   Neurological: He is alert and oriented to person, place, and time.   Skin: Skin is warm and dry. Capillary refill takes less than 2 seconds.   Psychiatric: He has a normal mood and affect. His behavior is normal. Judgment and thought content normal.       Significant Labs:  CBC:    Recent Labs  Lab 07/17/18  1035 07/18/18  0334   WBC 5.02 4.10   RBC 5.15 5.91   HGB 14.1 15.9   HCT 44.1 51.3    125*   MCV 86 87   MCH 27.4 26.9*   MCHC 32.0 31.0*     BNP:    Recent Labs  Lab 07/17/18  1035   *     CMP:    Recent Labs  Lab 07/17/18  1035 07/18/18  0620   GLU 84 222*   CALCIUM 8.7 8.6*   ALBUMIN 2.5* 2.4*   PROT 5.6* 5.5*    138   K 3.7 3.8   CO2 31* 28   CL 98 99   BUN 14 15   CREATININE 0.9 0.9   ALKPHOS 191* 183*   ALT 20 18   AST 26 21   BILITOT 0.9 1.5*      Coagulation:     Recent Labs  Lab 07/17/18  1308   INR 1.0     LDH:  No results for input(s): LDH in the last 72 hours.  Microbiology:  Microbiology Results (last 7 days)     ** No results found for the last 168 hours. **          I have reviewed all pertinent labs within the past 24 hours.    Estimated Creatinine Clearance: 136.9 mL/min (based on SCr of 0.9 mg/dL).    Diagnostic Results:  I have reviewed all pertinent imaging results/findings within the past 24 hours.    Assessment and Plan:     28 yo male, new to our service, s/p OHTx in 2010 at [facility] presented to the ED with ~ 2 week h/o worsening CORTEZ and JUN that extends up to scrotum. He relates that he cut ties with the [facility] program ~ 2-3 years ago after undergoing coronary PCI's to his transplanted heart. Was  doing well until April when he was hospitalized at [facility] for volume overload and was diuresed. Was seen by Dr. Holliday 7/6/18 and TTE on that day was 53%, RV mod enlarge, LA markedly dilated and mild to mod MR. TTE today shows decline in LVEF to 25-30%.    He admits that he takes his meds sporadically. Supposed to be on Prograf 1 mg bid. Also supposed to take Plavix for his PCI's, and says he takes Lasix 80 mg from time to time. Brought his pill bottles - all are out of date. He admits to smoking marijuana daily. 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 Normotensive.    * Heart transplant failure and rejection    - L/RHC done 7/17 showed significant CAD/CAV, no interventions done. RA: 30, PA: 62/40, W: 40, CO/CI 4.85/2.24. Unable to obtain EMBx via IJ approach - will need to do groin approach in the future for EMBx  - Given 1 gram IV Solu Medrol yesterday, will give 2 more doses, then start Prednisone wean  - DSA pending - will try to obtain donor antibodies from [facility] or [place]  - Add OI prophylaxis given high dose steroids with Nystatin, Bactrim. Check CMV IgG to see if Valcyte needed  - Continue diuresis with Lasix at 10 mg/hr  - On monotherapy with Prograf (goal level 5-10). Was also on Rapamune with Prograf in the past, but stopped taking it when he couldn't get it for free  - Continue Plavix and ASA for h/o PCI's  - Check lipids and add high dose statin  - Transfer to step down        Acute on chronic combined systolic and diastolic heart failure    - 2/2 allograft rejection  - Continue diuresis as above        Marijuana smoker    - Tox screen +ve for THC, amphetamines        Hyperglycemia    - 2/2 IV steroids  - SSI          Uninterrupted Critical Care/Counseling Time (not including procedures): 45 [place] [id number]  Heart Transplant  [facility]

## 2018-07-18 NOTE — PLAN OF CARE
Problem: Patient Care Overview  Goal: Plan of Care Review  Outcome: Ongoing (interventions implemented as appropriate)  POC reviewed with patient. Pt verbalized understanding. Questions and concerns addressed. No acute events overnight. Pt remained flat during the night until about 1:30. No distress noted. Pt to room air. Lasix infusion initiated. Pt put out around 3.5 L of urine. Pt complained of incsional neck pain, but not signs of hematoma or complications noted. Pt progressing toward goals. Will continue to monitor. See flow sheets for full assessment and VS info

## 2018-07-18 NOTE — PLAN OF CARE
Problem: Patient Care Overview  Goal: Plan of Care Review  Outcome: Ongoing (interventions implemented as appropriate)  Pt VSSthroughout shift. HR 90s, -110s, O2 >98% on RA, tmax 98. AAOx4, following commands and moving all extremities well. Family member at bedside, updated on current condition and POC for am shift. All questions answered and emotional support provided. Lasix Gtt @5 UOP adequate see flow chart for more details. Pain med tramadol 50mg given x1. Pt assisted with weight shift, and encouraged to cough/deep breathe. Remains free of falls and injury for am shift. MD updated on current condition, vitals, labs, and gtts. Transfer ordered in place waiting bed in TSU. No new orders received, will continue to monitor.

## 2018-07-19 LAB
ALBUMIN SERPL BCP-MCNC: 2.5 G/DL
ALP SERPL-CCNC: 156 U/L
ALT SERPL W/O P-5'-P-CCNC: 15 U/L
ANION GAP SERPL CALC-SCNC: 10 MMOL/L
AST SERPL-CCNC: 16 U/L
BASOPHILS # BLD AUTO: 0.01 K/UL
BASOPHILS NFR BLD: 0.1 %
BILIRUB SERPL-MCNC: 0.8 MG/DL
BUN SERPL-MCNC: 23 MG/DL
CALCIUM SERPL-MCNC: 7.9 MG/DL
CHLORIDE SERPL-SCNC: 96 MMOL/L
CLASS I ANTIBODY COMMENTS - LUMINEX: NORMAL
CLASS II ANTIBODY COMMENTS - LUMINEX: NORMAL
CO2 SERPL-SCNC: 33 MMOL/L
CREAT SERPL-MCNC: 1 MG/DL
DIFFERENTIAL METHOD: ABNORMAL
DSA1 TESTING DATE: NORMAL
DSA12 TESTING DATE: NORMAL
DSA2 TESTING DATE: NORMAL
EOSINOPHIL # BLD AUTO: 0 K/UL
EOSINOPHIL NFR BLD: 0 %
ERYTHROCYTE [DISTWIDTH] IN BLOOD BY AUTOMATED COUNT: 16 %
EST. GFR  (AFRICAN AMERICAN): >60 ML/MIN/1.73 M^2
EST. GFR  (NON AFRICAN AMERICAN): >60 ML/MIN/1.73 M^2
GLUCOSE SERPL-MCNC: 146 MG/DL
HBV SURFACE AG SERPL QL IA: NEGATIVE
HCT VFR BLD AUTO: 43.3 %
HCV AB SERPL QL IA: NEGATIVE
HGB BLD-MCNC: 13.9 G/DL
IMM GRANULOCYTES # BLD AUTO: 0.07 K/UL
IMM GRANULOCYTES NFR BLD AUTO: 0.7 %
LYMPHOCYTES # BLD AUTO: 0.2 K/UL
LYMPHOCYTES NFR BLD: 2.6 %
MAGNESIUM SERPL-MCNC: 1.7 MG/DL
MCH RBC QN AUTO: 27.3 PG
MCHC RBC AUTO-ENTMCNC: 32.1 G/DL
MCV RBC AUTO: 85 FL
MONOCYTES # BLD AUTO: 0.4 K/UL
MONOCYTES NFR BLD: 4.1 %
NEUTROPHILS # BLD AUTO: 8.6 K/UL
NEUTROPHILS NFR BLD: 92.5 %
NRBC BLD-RTO: 0 /100 WBC
PLATELET # BLD AUTO: 142 K/UL
PMV BLD AUTO: 10.4 FL
POCT GLUCOSE: 118 MG/DL (ref 70–110)
POCT GLUCOSE: 155 MG/DL (ref 70–110)
POCT GLUCOSE: 215 MG/DL (ref 70–110)
POTASSIUM SERPL-SCNC: 3.3 MMOL/L
PROT SERPL-MCNC: 5.3 G/DL
RBC # BLD AUTO: 5.1 M/UL
SERUM COLLECTION DT - LUMINEX CLASS I: NORMAL
SERUM COLLECTION DT - LUMINEX CLASS II: NORMAL
SODIUM SERPL-SCNC: 139 MMOL/L
TACROLIMUS BLD-MCNC: 4.3 NG/ML
WBC # BLD AUTO: 9.34 K/UL

## 2018-07-19 PROCEDURE — 85025 COMPLETE CBC W/AUTO DIFF WBC: CPT

## 2018-07-19 PROCEDURE — 25000003 PHARM REV CODE 250: Performed by: INTERNAL MEDICINE

## 2018-07-19 PROCEDURE — 83735 ASSAY OF MAGNESIUM: CPT

## 2018-07-19 PROCEDURE — 63600175 PHARM REV CODE 636 W HCPCS: Performed by: INTERNAL MEDICINE

## 2018-07-19 PROCEDURE — 80197 ASSAY OF TACROLIMUS: CPT

## 2018-07-19 PROCEDURE — 80053 COMPREHEN METABOLIC PANEL: CPT

## 2018-07-19 PROCEDURE — 99232 SBSQ HOSP IP/OBS MODERATE 35: CPT | Mod: ,,, | Performed by: NURSE PRACTITIONER

## 2018-07-19 PROCEDURE — 20600001 HC STEP DOWN PRIVATE ROOM

## 2018-07-19 PROCEDURE — 25000003 PHARM REV CODE 250: Performed by: NURSE PRACTITIONER

## 2018-07-19 PROCEDURE — 63600175 PHARM REV CODE 636 W HCPCS: Performed by: NURSE PRACTITIONER

## 2018-07-19 RX ORDER — POTASSIUM CHLORIDE 20 MEQ/1
60 TABLET, EXTENDED RELEASE ORAL ONCE
Status: COMPLETED | OUTPATIENT
Start: 2018-07-19 | End: 2018-07-19

## 2018-07-19 RX ORDER — TACROLIMUS 1 MG/1
2 CAPSULE ORAL 2 TIMES DAILY
Status: DISCONTINUED | OUTPATIENT
Start: 2018-07-19 | End: 2018-07-23 | Stop reason: HOSPADM

## 2018-07-19 RX ORDER — POTASSIUM CHLORIDE 20 MEQ/1
40 TABLET, EXTENDED RELEASE ORAL 2 TIMES DAILY
Status: DISCONTINUED | OUTPATIENT
Start: 2018-07-19 | End: 2018-07-22

## 2018-07-19 RX ORDER — LANOLIN ALCOHOL/MO/W.PET/CERES
400 CREAM (GRAM) TOPICAL 2 TIMES DAILY
Status: DISCONTINUED | OUTPATIENT
Start: 2018-07-19 | End: 2018-07-23 | Stop reason: HOSPADM

## 2018-07-19 RX ADMIN — TRAMADOL HYDROCHLORIDE 50 MG: 50 TABLET, COATED ORAL at 03:07

## 2018-07-19 RX ADMIN — INSULIN ASPART 2 UNITS: 100 INJECTION, SOLUTION INTRAVENOUS; SUBCUTANEOUS at 11:07

## 2018-07-19 RX ADMIN — NYSTATIN 500000 UNITS: 500000 SUSPENSION ORAL at 12:07

## 2018-07-19 RX ADMIN — MAGNESIUM OXIDE TAB 400 MG (241.3 MG ELEMENTAL MG) 400 MG: 400 (241.3 MG) TAB at 10:07

## 2018-07-19 RX ADMIN — POTASSIUM CHLORIDE 40 MEQ: 1500 TABLET, EXTENDED RELEASE ORAL at 10:07

## 2018-07-19 RX ADMIN — TRAMADOL HYDROCHLORIDE 50 MG: 50 TABLET, COATED ORAL at 10:07

## 2018-07-19 RX ADMIN — INSULIN ASPART 4 UNITS: 100 INJECTION, SOLUTION INTRAVENOUS; SUBCUTANEOUS at 08:07

## 2018-07-19 RX ADMIN — CLOPIDOGREL 75 MG: 75 TABLET, FILM COATED ORAL at 08:07

## 2018-07-19 RX ADMIN — ASPIRIN 81 MG: 81 TABLET, COATED ORAL at 08:07

## 2018-07-19 RX ADMIN — NYSTATIN 500000 UNITS: 500000 SUSPENSION ORAL at 05:07

## 2018-07-19 RX ADMIN — NYSTATIN 500000 UNITS: 500000 SUSPENSION ORAL at 08:07

## 2018-07-19 RX ADMIN — POTASSIUM CHLORIDE 60 MEQ: 1500 TABLET, EXTENDED RELEASE ORAL at 09:07

## 2018-07-19 RX ADMIN — ATORVASTATIN CALCIUM 80 MG: 20 TABLET, FILM COATED ORAL at 08:07

## 2018-07-19 RX ADMIN — INSULIN ASPART 4 UNITS: 100 INJECTION, SOLUTION INTRAVENOUS; SUBCUTANEOUS at 05:07

## 2018-07-19 RX ADMIN — TACROLIMUS 1 MG: 1 CAPSULE ORAL at 08:07

## 2018-07-19 RX ADMIN — MAGNESIUM OXIDE TAB 400 MG (241.3 MG ELEMENTAL MG) 400 MG: 400 (241.3 MG) TAB at 09:07

## 2018-07-19 RX ADMIN — DEXTROSE: 50 INJECTION, SOLUTION INTRAVENOUS at 12:07

## 2018-07-19 RX ADMIN — TACROLIMUS 2 MG: 1 CAPSULE ORAL at 05:07

## 2018-07-19 RX ADMIN — NYSTATIN 500000 UNITS: 500000 SUSPENSION ORAL at 10:07

## 2018-07-19 RX ADMIN — SULFAMETHOXAZOLE AND TRIMETHOPRIM 1 TABLET: 400; 80 TABLET ORAL at 08:07

## 2018-07-19 RX ADMIN — FUROSEMIDE 10 MG/HR: 10 INJECTION, SOLUTION INTRAMUSCULAR; INTRAVENOUS at 02:07

## 2018-07-19 NOTE — PROGRESS NOTES
[facility]  Heart Transplant  Progress Note    Patient Name: Jamar Smiley  MRN: [id number]  Admission Date: 7/17/2018  Hospital Length of Stay: 2 days  Attending Physician: Jaja Chang MD  Primary Care Provider: Primary Doctor No  Principal Problem:Heart transplant failure and rejection    Subjective:     Interval History: Net -ve > 2.5L past 24 hours (6.2L since admit), and JUN improving. Feels well.    Continuous Infusions:   furosemide (LASIX) 2 mg/mL infusion (non-titrating) 10 mg/hr (07/19/18 0200)     Scheduled Meds:   aspirin  81 mg Oral Daily    atorvastatin  80 mg Oral Daily    clopidogrel  75 mg Oral Daily    magnesium oxide  400 mg Oral BID    methylPREDNISolone (SOLU-Medrol) IVPB (doses > 250 mg)   Intravenous Daily    nystatin  500,000 Units Oral QID (WM & HS)    potassium chloride SA  40 mEq Oral BID    [START ON 7/20/2018] predniSONE  20 mg Oral Daily    sulfamethoxazole-trimethoprim 400-80mg  1 tablet Oral Daily    tacrolimus  1 mg Oral BID     PRN Meds:dextrose 50%, dextrose 50%, glucagon (human recombinant), glucose, glucose, insulin aspart U-100, traMADol    Review of patient's allergies indicates:   Allergen Reactions    Contrast media Shortness Of Breath, Itching and Rash     Objective:     Vital Signs (Most Recent):  Temp: 98.2 °F (36.8 °C) (07/19/18 0845)  Pulse: 77 (07/19/18 0845)  Resp: 16 (07/19/18 0845)  BP: 113/72 (07/19/18 0845)  SpO2: 98 % (07/19/18 0845) Vital Signs (24h Range):  Temp:  [97.6 °F (36.4 °C)-98.2 °F (36.8 °C)] 98.2 °F (36.8 °C)  Pulse:  [] 77  Resp:  [16-38] 16  SpO2:  [92 %-100 %] 98 %  BP: (104-128)/(62-90) 113/72     Patient Vitals for the past 72 hrs (Last 3 readings):   Weight   07/18/18 0400 93.8 kg (206 lb 12.7 oz)   07/17/18 1400 96.8 kg (213 lb 6.5 oz)   07/17/18 1203 94.2 kg (207 lb 10.8 oz)     Body mass index is 27.28 kg/m².      Intake/Output Summary (Last 24 hours) at 07/19/18 1115  Last data filed at 07/19/18  0900   Gross per 24 hour   Intake              975 ml   Output             2450 ml   Net            -1475 ml       Hemodynamic Parameters:       Telemetry: ST    Physical Exam   Constitutional: He is oriented to person, place, and time. He appears well-developed and well-nourished.   HENT:   Head: Normocephalic and atraumatic.   Eyes: Conjunctivae and EOM are normal. Pupils are equal, round, and reactive to light.   Neck: Normal range of motion. Neck supple.   Unable to visualize JVP. No JVD.   Cardiovascular: Normal rate and regular rhythm.    + S4   Pulmonary/Chest: Effort normal and breath sounds normal.   Abdominal: Soft. Bowel sounds are normal.   Musculoskeletal: Normal range of motion.   1-2+ edema mid thighs down   Neurological: He is alert and oriented to person, place, and time.   Skin: Skin is warm and dry. Capillary refill takes less than 2 seconds.   Psychiatric: He has a normal mood and affect. His behavior is normal. Judgment and thought content normal.       Significant Labs:  CBC:    Recent Labs  Lab 07/17/18  1035 07/18/18  0334 07/19/18  0438   WBC 5.02 4.10 9.34   RBC 5.15 5.91 5.10   HGB 14.1 15.9 13.9*   HCT 44.1 51.3 43.3    125* 142*   MCV 86 87 85   MCH 27.4 26.9* 27.3   MCHC 32.0 31.0* 32.1     BNP:    Recent Labs  Lab 07/17/18  1035   *     CMP:    Recent Labs  Lab 07/17/18  1035 07/18/18  0620 07/19/18  0438   GLU 84 222* 146*   CALCIUM 8.7 8.6* 7.9*   ALBUMIN 2.5* 2.4* 2.5*   PROT 5.6* 5.5* 5.3*    138 139   K 3.7 3.8 3.3*   CO2 31* 28 33*   CL 98 99 96   BUN 14 15 23*   CREATININE 0.9 0.9 1.0   ALKPHOS 191* 183* 156*   ALT 20 18 15   AST 26 21 16   BILITOT 0.9 1.5* 0.8      Coagulation:     Recent Labs  Lab 07/17/18  1308   INR 1.0     LDH:  No results for input(s): LDH in the last 72 hours.  Microbiology:  Microbiology Results (last 7 days)     ** No results found for the last 168 hours. **          I have reviewed all pertinent labs within the past 24  hours.    Estimated Creatinine Clearance: 123.2 mL/min (based on SCr of 1 mg/dL).    Diagnostic Results:  I have reviewed all pertinent imaging results/findings within the past 24 hours.    Assessment and Plan:     28 yo male, new to our service, s/p OHTx in 2010 at [facility] presented to the ED with ~ 2 week h/o worsening CORTEZ and JUN that extends up to scrotum. He relates that he cut ties with the [facility] program ~ 2-3 years ago after undergoing coronary PCI's to his transplanted heart. Was doing well until April when he was hospitalized at [facility] for volume overload and was diuresed. Was seen by Dr. Holliday 7/6/18 and TTE on that day was 53%, RV mod enlarge, LA markedly dilated and mild to mod MR. TTE today shows decline in LVEF to 25-30%.    He admits that he takes his meds sporadically. Supposed to be on Prograf 1 mg bid. Also supposed to take Plavix for his PCI's, and says he takes Lasix 80 mg from time to time. Brought his pill bottles - all are out of date. He admits to smoking marijuana daily. 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 Normotensive.    * Heart transplant failure and rejection    - L/RHC done 7/17 showed significant CAD/CAV, no interventions done. RA: 30, PA: 62/40, W: 40, CO/CI 4.85/2.24. Unable to obtain EMBx via IJ approach - will need to do groin approach in the future for EMBx  - Given 1 gram IV Solu Medrol yesterday, will give 1 more dose today (for a total of 3 doses), then start Prednisone wean  - DSA pending - will try to obtain donor antibodies from [facility] or [facility]  - Added OI prophylaxis given high dose steroids with Nystatin, Bactrim. Check CMV IgG to see if Valcyte needed  - Continue diuresis with Lasix at 10 mg/hr  - On monotherapy with Prograf (goal level 5-10). Was also on Rapamune with Prograf in the past, but stopped taking it when he couldn't get it for free  - Continue Plavix, ASA and high intensity statin for h/o PCI's          Acute on  chronic combined systolic and diastolic heart failure    - 2/2 allograft rejection  - Continue diuresis as above        Marijuana smoker    - Tox screen +ve for THC, amphetamines        Hyperglycemia    - 2/2 IV steroids  - [place] [id number]  Heart Transplant  [facility]

## 2018-07-19 NOTE — PROGRESS NOTES
Pt arrived to the unit via wheelchair. VSS, see flowsheet for further assessment details. Pt denies any pain or discomfort at this time. Pt oriented to room and call light; pt verbalized understanding. Lasix gtt infusing at 10mg/hr.

## 2018-07-19 NOTE — SUBJECTIVE & OBJECTIVE
Interval History: Net -ve > 2.5L past 24 hours (6.2L since admit), and JUN improving. Feels well.    Continuous Infusions:   furosemide (LASIX) 2 mg/mL infusion (non-titrating) 10 mg/hr (07/19/18 0200)     Scheduled Meds:   aspirin  81 mg Oral Daily    atorvastatin  80 mg Oral Daily    clopidogrel  75 mg Oral Daily    magnesium oxide  400 mg Oral BID    methylPREDNISolone (SOLU-Medrol) IVPB (doses > 250 mg)   Intravenous Daily    nystatin  500,000 Units Oral QID (WM & HS)    potassium chloride SA  40 mEq Oral BID    [START ON 7/20/2018] predniSONE  20 mg Oral Daily    sulfamethoxazole-trimethoprim 400-80mg  1 tablet Oral Daily    tacrolimus  1 mg Oral BID     PRN Meds:dextrose 50%, dextrose 50%, glucagon (human recombinant), glucose, glucose, insulin aspart U-100, traMADol    Review of patient's allergies indicates:   Allergen Reactions    Contrast media Shortness Of Breath, Itching and Rash     Objective:     Vital Signs (Most Recent):  Temp: 98.2 °F (36.8 °C) (07/19/18 0845)  Pulse: 77 (07/19/18 0845)  Resp: 16 (07/19/18 0845)  BP: 113/72 (07/19/18 0845)  SpO2: 98 % (07/19/18 0845) Vital Signs (24h Range):  Temp:  [97.6 °F (36.4 °C)-98.2 °F (36.8 °C)] 98.2 °F (36.8 °C)  Pulse:  [] 77  Resp:  [16-38] 16  SpO2:  [92 %-100 %] 98 %  BP: (104-128)/(62-90) 113/72     Patient Vitals for the past 72 hrs (Last 3 readings):   Weight   07/18/18 0400 93.8 kg (206 lb 12.7 oz)   07/17/18 1400 96.8 kg (213 lb 6.5 oz)   07/17/18 1203 94.2 kg (207 lb 10.8 oz)     Body mass index is 27.28 kg/m².      Intake/Output Summary (Last 24 hours) at 07/19/18 1115  Last data filed at 07/19/18 0900   Gross per 24 hour   Intake              975 ml   Output             2450 ml   Net            -1475 ml       Hemodynamic Parameters:       Telemetry: ST    Physical Exam   Constitutional: He is oriented to person, place, and time. He appears well-developed and well-nourished.   HENT:   Head: Normocephalic and atraumatic.   Eyes:  Conjunctivae and EOM are normal. Pupils are equal, round, and reactive to light.   Neck: Normal range of motion. Neck supple.   Unable to visualize JVP. No JVD.   Cardiovascular: Normal rate and regular rhythm.    + S4   Pulmonary/Chest: Effort normal and breath sounds normal.   Abdominal: Soft. Bowel sounds are normal.   Musculoskeletal: Normal range of motion.   1-2+ edema mid thighs down   Neurological: He is alert and oriented to person, place, and time.   Skin: Skin is warm and dry. Capillary refill takes less than 2 seconds.   Psychiatric: He has a normal mood and affect. His behavior is normal. Judgment and thought content normal.       Significant Labs:  CBC:    Recent Labs  Lab 07/17/18  1035 07/18/18  0334 07/19/18  0438   WBC 5.02 4.10 9.34   RBC 5.15 5.91 5.10   HGB 14.1 15.9 13.9*   HCT 44.1 51.3 43.3    125* 142*   MCV 86 87 85   MCH 27.4 26.9* 27.3   MCHC 32.0 31.0* 32.1     BNP:    Recent Labs  Lab 07/17/18  1035   *     CMP:    Recent Labs  Lab 07/17/18  1035 07/18/18  0620 07/19/18  0438   GLU 84 222* 146*   CALCIUM 8.7 8.6* 7.9*   ALBUMIN 2.5* 2.4* 2.5*   PROT 5.6* 5.5* 5.3*    138 139   K 3.7 3.8 3.3*   CO2 31* 28 33*   CL 98 99 96   BUN 14 15 23*   CREATININE 0.9 0.9 1.0   ALKPHOS 191* 183* 156*   ALT 20 18 15   AST 26 21 16   BILITOT 0.9 1.5* 0.8      Coagulation:     Recent Labs  Lab 07/17/18  1308   INR 1.0     LDH:  No results for input(s): LDH in the last 72 hours.  Microbiology:  Microbiology Results (last 7 days)     ** No results found for the last 168 hours. **          I have reviewed all pertinent labs within the past 24 hours.    Estimated Creatinine Clearance: 123.2 mL/min (based on SCr of 1 mg/dL).    Diagnostic Results:  I have reviewed all pertinent imaging results/findings within the past 24 hours.

## 2018-07-19 NOTE — ASSESSMENT & PLAN NOTE
- L/RHC done 7/17 showed significant CAD/CAV, no interventions done. RA: 30, PA: 62/40, W: 40, CO/CI 4.85/2.24. Unable to obtain EMBx via IJ approach - will need to do groin approach in the future for EMBx  - Given 1 gram IV Solu Medrol yesterday, will give 1 more dose today (for a total of 3 doses), then start Prednisone wean  - DSA pending - will try to obtain donor antibodies from [facility] or [facility]  - Added OI prophylaxis given high dose steroids with Nystatin, Bactrim. Check CMV IgG to see if Valcyte needed  - Continue diuresis with Lasix at 10 mg/hr  - On monotherapy with Prograf (goal level 5-10). Was also on Rapamune with Prograf in the past, but stopped taking it when he couldn't get it for free  - Continue Plavix, ASA and high intensity statin for h/o PCI's

## 2018-07-19 NOTE — NURSING TRANSFER
Nursing Transfer Note      7/19/2018     Nicole RN received    Transfer To: [id number]    Transfer via wheelchair    Transfer with cardiac monitoring    Transported by FLIP Hayward RN    Medicines sent: None    Chart send with patient: Yes    Notified: Parents    Patient reassessed at: 07/19/18, 0515     Upon arrival to floor: cardiac monitor applied, patient oriented to room, call bell in reach and bed in lowest position

## 2018-07-19 NOTE — PLAN OF CARE
Pt is AAOx4; afebrile; vital signs stable. BG ranged from 241-156 today. Appetite is good. Solumedrol dose given. Lasix gtt continues at 10mg/hr. He is up independently. Parents are at bedside, attentive to pt.

## 2018-07-20 LAB
ALBUMIN SERPL BCP-MCNC: 2.5 G/DL
ALP SERPL-CCNC: 155 U/L
ALT SERPL W/O P-5'-P-CCNC: 18 U/L
ANION GAP SERPL CALC-SCNC: 13 MMOL/L
AST SERPL-CCNC: 19 U/L
BASOPHILS # BLD AUTO: 0 K/UL
BASOPHILS NFR BLD: 0 %
BILIRUB SERPL-MCNC: 0.6 MG/DL
BUN SERPL-MCNC: 24 MG/DL
C1Q1 TESTING DATE: NORMAL
C1Q1 TESTING DATE: NORMAL
C1Q2 TESTING DATE: NORMAL
CALCIUM SERPL-MCNC: 7.9 MG/DL
CHLORIDE SERPL-SCNC: 96 MMOL/L
CLASS I ANTIBODIES - LUMINEX: NORMAL
CLASS I ANTIBODY COMMENTS - LUMINEX: NORMAL
CLASS II ANTIBODY COMMENTS - LUMINEX: NORMAL
CMV IGG SERPL QL IA: REACTIVE
CO2 SERPL-SCNC: 31 MMOL/L
CREAT SERPL-MCNC: 0.9 MG/DL
DIFFERENTIAL METHOD: ABNORMAL
EOSINOPHIL # BLD AUTO: 0 K/UL
EOSINOPHIL NFR BLD: 0 %
ERYTHROCYTE [DISTWIDTH] IN BLOOD BY AUTOMATED COUNT: 16.3 %
EST. GFR  (AFRICAN AMERICAN): >60 ML/MIN/1.73 M^2
EST. GFR  (NON AFRICAN AMERICAN): >60 ML/MIN/1.73 M^2
GLUCOSE SERPL-MCNC: 123 MG/DL
HCT VFR BLD AUTO: 42.8 %
HGB BLD-MCNC: 13.5 G/DL
IMM GRANULOCYTES # BLD AUTO: 0.05 K/UL
IMM GRANULOCYTES NFR BLD AUTO: 0.7 %
LYMPHOCYTES # BLD AUTO: 0.3 K/UL
LYMPHOCYTES NFR BLD: 4 %
MAGNESIUM SERPL-MCNC: 1.9 MG/DL
MCH RBC QN AUTO: 27.2 PG
MCHC RBC AUTO-ENTMCNC: 31.5 G/DL
MCV RBC AUTO: 86 FL
MONOCYTES # BLD AUTO: 0.3 K/UL
MONOCYTES NFR BLD: 3.6 %
NEUTROPHILS # BLD AUTO: 6.4 K/UL
NEUTROPHILS NFR BLD: 91.7 %
NRBC BLD-RTO: 0 /100 WBC
PLATELET # BLD AUTO: 127 K/UL
PMV BLD AUTO: 10.6 FL
POCT GLUCOSE: 142 MG/DL (ref 70–110)
POCT GLUCOSE: 146 MG/DL (ref 70–110)
POCT GLUCOSE: 194 MG/DL (ref 70–110)
POCT GLUCOSE: 202 MG/DL (ref 70–110)
POCT GLUCOSE: 226 MG/DL (ref 70–110)
POTASSIUM SERPL-SCNC: 3.7 MMOL/L
PROT SERPL-MCNC: 5.2 G/DL
RBC # BLD AUTO: 4.96 M/UL
SERUM COLLECTION DT - LUMINEX CLASS I: NORMAL
SERUM COLLECTION DT - LUMINEX CLASS II: NORMAL
SODIUM SERPL-SCNC: 140 MMOL/L
TACROLIMUS BLD-MCNC: 5.1 NG/ML
WBC # BLD AUTO: 7 K/UL

## 2018-07-20 PROCEDURE — 63600175 PHARM REV CODE 636 W HCPCS: Performed by: NURSE PRACTITIONER

## 2018-07-20 PROCEDURE — 83735 ASSAY OF MAGNESIUM: CPT

## 2018-07-20 PROCEDURE — 63600175 PHARM REV CODE 636 W HCPCS: Performed by: INTERNAL MEDICINE

## 2018-07-20 PROCEDURE — 20600001 HC STEP DOWN PRIVATE ROOM

## 2018-07-20 PROCEDURE — 25000003 PHARM REV CODE 250: Performed by: INTERNAL MEDICINE

## 2018-07-20 PROCEDURE — 36415 COLL VENOUS BLD VENIPUNCTURE: CPT

## 2018-07-20 PROCEDURE — 99233 SBSQ HOSP IP/OBS HIGH 50: CPT | Mod: ,,, | Performed by: NURSE PRACTITIONER

## 2018-07-20 PROCEDURE — 25000003 PHARM REV CODE 250: Performed by: NURSE PRACTITIONER

## 2018-07-20 PROCEDURE — 85025 COMPLETE CBC W/AUTO DIFF WBC: CPT

## 2018-07-20 PROCEDURE — 80053 COMPREHEN METABOLIC PANEL: CPT

## 2018-07-20 PROCEDURE — 80197 ASSAY OF TACROLIMUS: CPT

## 2018-07-20 RX ORDER — FUROSEMIDE 10 MG/ML
80 INJECTION INTRAMUSCULAR; INTRAVENOUS 2 TIMES DAILY
Status: DISCONTINUED | OUTPATIENT
Start: 2018-07-20 | End: 2018-07-21

## 2018-07-20 RX ADMIN — POTASSIUM CHLORIDE 40 MEQ: 1500 TABLET, EXTENDED RELEASE ORAL at 08:07

## 2018-07-20 RX ADMIN — FUROSEMIDE 80 MG: 10 INJECTION, SOLUTION INTRAMUSCULAR; INTRAVENOUS at 05:07

## 2018-07-20 RX ADMIN — NYSTATIN 500000 UNITS: 500000 SUSPENSION ORAL at 08:07

## 2018-07-20 RX ADMIN — MAGNESIUM OXIDE TAB 400 MG (241.3 MG ELEMENTAL MG) 400 MG: 400 (241.3 MG) TAB at 08:07

## 2018-07-20 RX ADMIN — NYSTATIN 500000 UNITS: 500000 SUSPENSION ORAL at 04:07

## 2018-07-20 RX ADMIN — CLOPIDOGREL 75 MG: 75 TABLET, FILM COATED ORAL at 08:07

## 2018-07-20 RX ADMIN — NYSTATIN 500000 UNITS: 500000 SUSPENSION ORAL at 11:07

## 2018-07-20 RX ADMIN — INSULIN ASPART 2 UNITS: 100 INJECTION, SOLUTION INTRAVENOUS; SUBCUTANEOUS at 11:07

## 2018-07-20 RX ADMIN — SULFAMETHOXAZOLE AND TRIMETHOPRIM 1 TABLET: 400; 80 TABLET ORAL at 08:07

## 2018-07-20 RX ADMIN — PREDNISONE 20 MG: 10 TABLET ORAL at 08:07

## 2018-07-20 RX ADMIN — FUROSEMIDE 10 MG/HR: 10 INJECTION, SOLUTION INTRAMUSCULAR; INTRAVENOUS at 11:07

## 2018-07-20 RX ADMIN — ATORVASTATIN CALCIUM 80 MG: 20 TABLET, FILM COATED ORAL at 08:07

## 2018-07-20 RX ADMIN — ASPIRIN 81 MG: 81 TABLET, COATED ORAL at 08:07

## 2018-07-20 RX ADMIN — TACROLIMUS 2 MG: 1 CAPSULE ORAL at 08:07

## 2018-07-20 RX ADMIN — INSULIN ASPART 4 UNITS: 100 INJECTION, SOLUTION INTRAVENOUS; SUBCUTANEOUS at 04:07

## 2018-07-20 RX ADMIN — TACROLIMUS 2 MG: 1 CAPSULE ORAL at 05:07

## 2018-07-20 NOTE — PLAN OF CARE
Pt AAOx4, VSS, afebrile.  Continued IV lasix at 10mg/hr.  Received 2/3 solumedrol yesterday; will receive 3/3 solumedrol today.  Last BG = 118. Fall risk precautions initiated.  Pt in lowest bed position setting, lighting adjusted, pt to wear nonskid socks when ambulating, side rails up x2.  Pt remain free from falls during shift.   Call light within reach. Will continue to monitor.

## 2018-07-20 NOTE — PROGRESS NOTES
[facility]  Heart Transplant  Progress Note    Patient Name: Jamar Smiley  MRN: [id number]  Admission Date: 7/17/2018  Hospital Length of Stay: 3 days  Attending Physician: Jaja Chang MD  Primary Care Provider: Primary Doctor No  Principal Problem:Heart transplant failure and rejection    Subjective:     Interval History: No complaints this am. Feels well.     Continuous Infusions:   furosemide (LASIX) 2 mg/mL infusion (non-titrating) 10 mg/hr (07/19/18 1405)     Scheduled Meds:   aspirin  81 mg Oral Daily    atorvastatin  80 mg Oral Daily    clopidogrel  75 mg Oral Daily    magnesium oxide  400 mg Oral BID    nystatin  500,000 Units Oral QID (WM & HS)    potassium chloride SA  40 mEq Oral BID    predniSONE  20 mg Oral Daily    sulfamethoxazole-trimethoprim 400-80mg  1 tablet Oral Daily    tacrolimus  2 mg Oral BID     PRN Meds:dextrose 50%, dextrose 50%, glucagon (human recombinant), glucose, glucose, insulin aspart U-100, traMADol    Review of patient's allergies indicates:   Allergen Reactions    Contrast media Shortness Of Breath, Itching and Rash     Objective:     Vital Signs (Most Recent):  Temp: 97.7 °F (36.5 °C) (07/20/18 1015)  Pulse: 101 (07/20/18 0834)  Resp: 18 (07/20/18 0834)  BP: 122/70 (07/20/18 0834)  SpO2: 100 % (07/20/18 0834) Vital Signs (24h Range):  Temp:  [96.9 °F (36.1 °C)-97.9 °F (36.6 °C)] 97.7 °F (36.5 °C)  Pulse:  [] 101  Resp:  [17-18] 18  SpO2:  [96 %-100 %] 100 %  BP: ()/(56-82) 122/70     Patient Vitals for the past 72 hrs (Last 3 readings):   Weight   07/20/18 0600 86.6 kg (190 lb 14.7 oz)   07/18/18 0400 93.8 kg (206 lb 12.7 oz)   07/17/18 1400 96.8 kg (213 lb 6.5 oz)     Body mass index is 25.19 kg/m².      Intake/Output Summary (Last 24 hours) at 07/20/18 1046  Last data filed at 07/20/18 1000   Gross per 24 hour   Intake              415 ml   Output             1800 ml   Net            -1385 ml        Telemetry: ST    Physical  Exam   Constitutional: He is oriented to person, place, and time. He appears well-developed and well-nourished.   HENT:   Head: Normocephalic and atraumatic.   Eyes: Conjunctivae and EOM are normal. Pupils are equal, round, and reactive to light.   Neck: Normal range of motion. Neck supple.   Unable to visualize JVP. No JVD.   Cardiovascular: Normal rate and regular rhythm.    + S4   Pulmonary/Chest: Effort normal and breath sounds normal.   Abdominal: Soft. Bowel sounds are normal.   Musculoskeletal: Normal range of motion.   1+ BLE edema.   Neurological: He is alert and oriented to person, place, and time.   Skin: Skin is warm and dry. Capillary refill takes less than 2 seconds.   Psychiatric: He has a normal mood and affect. His behavior is normal. Judgment and thought content normal.     Significant Labs:  CBC:    Recent Labs  Lab 07/18/18  0334 07/19/18  0438 07/20/18  0549   WBC 4.10 9.34 7.00   RBC 5.91 5.10 4.96   HGB 15.9 13.9* 13.5*   HCT 51.3 43.3 42.8   * 142* 127*   MCV 87 85 86   MCH 26.9* 27.3 27.2   MCHC 31.0* 32.1 31.5*     BNP:    Recent Labs  Lab 07/17/18  1035   *     CMP:    Recent Labs  Lab 07/18/18  0620 07/19/18  0438 07/20/18  0549   * 146* 123*   CALCIUM 8.6* 7.9* 7.9*   ALBUMIN 2.4* 2.5* 2.5*   PROT 5.5* 5.3* 5.2*    139 140   K 3.8 3.3* 3.7   CO2 28 33* 31*   CL 99 96 96   BUN 15 23* 24*   CREATININE 0.9 1.0 0.9   ALKPHOS 183* 156* 155*   ALT 18 15 18   AST 21 16 19   BILITOT 1.5* 0.8 0.6      Coagulation:     Recent Labs  Lab 07/17/18  1308   INR 1.0     LDH:  No results for input(s): LDH in the last 72 hours.  Microbiology:  Microbiology Results (last 7 days)     ** No results found for the last 168 hours. **        I have reviewed all pertinent labs within the past 24 hours.    Estimated Creatinine Clearance: 136.9 mL/min (based on SCr of 0.9 mg/dL).    Diagnostic Results:  I have reviewed all pertinent imaging results/findings within the past 24  hours.    Assessment and Plan:     26 yo male, new to our service, s/p OHTx in 2010 at [facility] presented to the ED with ~ 2 week h/o worsening CORTEZ and JUN that extends up to scrotum. He relates that he cut ties with the [facility] program ~ 2-3 years ago after undergoing coronary PCI's to his transplanted heart. Was doing well until April when he was hospitalized at [facility] for volume overload and was diuresed. Was seen by Dr. Holliday 7/6/18 and TTE on that day was 53%, RV mod enlarge, LA markedly dilated and mild to mod MR. TTE today shows decline in LVEF to 25-30%.    He admits that he takes his meds sporadically. Supposed to be on Prograf 1 mg bid. Also supposed to take Plavix for his PCI's, and says he takes Lasix 80 mg from time to time. Brought his pill bottles - all are out of date. He admits to smoking marijuana daily. 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 Normotensive.    * Heart transplant failure and rejection    - L/RHC done 7/17 showed significant CAD/CAV, no interventions done. RA: 30, PA: 62/40, W: 40, CO/CI 4.85/2.24. Unable to obtain EMBx via IJ approach - will need to do groin approach in the future for EMBx.  - Given 3 doses of Solu Medrol. Will start Prednisone.  - DSA pending - will try to obtain donor antibodies from [facility] or [facility].  - Added OI prophylaxis given high dose steroids with Nystatin, Bactrim. Check CMV IgG to see if Valcyte needed.  - Continue diuresis with Lasix at 10 mg/hr.  - Continue Prograf (goal level 5-10). Was also on Rapamune with Prograf in the past, but stopped taking it when he couldn't get it for free.  - Continue Plavix, ASA and high intensity statin for h/o PCI's.        Hyperglycemia    - 2/2 IV steroids  - SSI        Acute on chronic combined systolic and diastolic heart failure    - 2/2 allograft rejection  - Continue diuresis as above        Marijuana smoker    - Tox screen +ve for THC, amphetamines          Coy Hayes,  NP  Heart Transplant  [facility]

## 2018-07-20 NOTE — SUBJECTIVE & OBJECTIVE
Interval History: No complaints this am. Feels well.     Continuous Infusions:   furosemide (LASIX) 2 mg/mL infusion (non-titrating) 10 mg/hr (07/19/18 1405)     Scheduled Meds:   aspirin  81 mg Oral Daily    atorvastatin  80 mg Oral Daily    clopidogrel  75 mg Oral Daily    magnesium oxide  400 mg Oral BID    nystatin  500,000 Units Oral QID (WM & HS)    potassium chloride SA  40 mEq Oral BID    predniSONE  20 mg Oral Daily    sulfamethoxazole-trimethoprim 400-80mg  1 tablet Oral Daily    tacrolimus  2 mg Oral BID     PRN Meds:dextrose 50%, dextrose 50%, glucagon (human recombinant), glucose, glucose, insulin aspart U-100, traMADol    Review of patient's allergies indicates:   Allergen Reactions    Contrast media Shortness Of Breath, Itching and Rash     Objective:     Vital Signs (Most Recent):  Temp: 97.7 °F (36.5 °C) (07/20/18 1015)  Pulse: 101 (07/20/18 0834)  Resp: 18 (07/20/18 0834)  BP: 122/70 (07/20/18 0834)  SpO2: 100 % (07/20/18 0834) Vital Signs (24h Range):  Temp:  [96.9 °F (36.1 °C)-97.9 °F (36.6 °C)] 97.7 °F (36.5 °C)  Pulse:  [] 101  Resp:  [17-18] 18  SpO2:  [96 %-100 %] 100 %  BP: ()/(56-82) 122/70     Patient Vitals for the past 72 hrs (Last 3 readings):   Weight   07/20/18 0600 86.6 kg (190 lb 14.7 oz)   07/18/18 0400 93.8 kg (206 lb 12.7 oz)   07/17/18 1400 96.8 kg (213 lb 6.5 oz)     Body mass index is 25.19 kg/m².      Intake/Output Summary (Last 24 hours) at 07/20/18 1046  Last data filed at 07/20/18 1000   Gross per 24 hour   Intake              415 ml   Output             1800 ml   Net            -1385 ml        Telemetry: ST    Physical Exam   Constitutional: He is oriented to person, place, and time. He appears well-developed and well-nourished.   HENT:   Head: Normocephalic and atraumatic.   Eyes: Conjunctivae and EOM are normal. Pupils are equal, round, and reactive to light.   Neck: Normal range of motion. Neck supple.   Unable to visualize JVP. No JVD.    Cardiovascular: Normal rate and regular rhythm.    + S4   Pulmonary/Chest: Effort normal and breath sounds normal.   Abdominal: Soft. Bowel sounds are normal.   Musculoskeletal: Normal range of motion.   1+ BLE edema.   Neurological: He is alert and oriented to person, place, and time.   Skin: Skin is warm and dry. Capillary refill takes less than 2 seconds.   Psychiatric: He has a normal mood and affect. His behavior is normal. Judgment and thought content normal.     Significant Labs:  CBC:    Recent Labs  Lab 07/18/18  0334 07/19/18  0438 07/20/18  0549   WBC 4.10 9.34 7.00   RBC 5.91 5.10 4.96   HGB 15.9 13.9* 13.5*   HCT 51.3 43.3 42.8   * 142* 127*   MCV 87 85 86   MCH 26.9* 27.3 27.2   MCHC 31.0* 32.1 31.5*     BNP:    Recent Labs  Lab 07/17/18  1035   *     CMP:    Recent Labs  Lab 07/18/18  0620 07/19/18  0438 07/20/18  0549   * 146* 123*   CALCIUM 8.6* 7.9* 7.9*   ALBUMIN 2.4* 2.5* 2.5*   PROT 5.5* 5.3* 5.2*    139 140   K 3.8 3.3* 3.7   CO2 28 33* 31*   CL 99 96 96   BUN 15 23* 24*   CREATININE 0.9 1.0 0.9   ALKPHOS 183* 156* 155*   ALT 18 15 18   AST 21 16 19   BILITOT 1.5* 0.8 0.6      Coagulation:     Recent Labs  Lab 07/17/18  1308   INR 1.0     LDH:  No results for input(s): LDH in the last 72 hours.  Microbiology:  Microbiology Results (last 7 days)     ** No results found for the last 168 hours. **        I have reviewed all pertinent labs within the past 24 hours.    Estimated Creatinine Clearance: 136.9 mL/min (based on SCr of 0.9 mg/dL).    Diagnostic Results:  I have reviewed all pertinent imaging results/findings within the past 24 hours.

## 2018-07-20 NOTE — ASSESSMENT & PLAN NOTE
- L/RHC done 7/17 showed significant CAD/CAV, no interventions done. RA: 30, PA: 62/40, W: 40, CO/CI 4.85/2.24. Unable to obtain EMBx via IJ approach - will need to do groin approach in the future for EMBx.  - Given 3 doses of Solu Medrol. Will start Prednisone.  - DSA pending - will try to obtain donor antibodies from [facility] or [facility].  - Added OI prophylaxis given high dose steroids with Nystatin, Bactrim. Check CMV IgG to see if Valcyte needed.  - Continue diuresis with Lasix at 10 mg/hr.  - Continue Prograf (goal level 5-10). Was also on Rapamune with Prograf in the past, but stopped taking it when he couldn't get it for free.  - Continue Plavix, ASA and high intensity statin for h/o PCI's.

## 2018-07-20 NOTE — NURSING
Rounds Report: Attended interdisciplinary rounds this morning with the transplant team including SW, physicians, fellows,  mid-level providers, and transplant coordinators.  Discussed plan of care, including diuresing on lasix drip. Will discharge next week. Will fu with HTS here.

## 2018-07-21 LAB
ALBUMIN SERPL BCP-MCNC: 2.6 G/DL
ALP SERPL-CCNC: 157 U/L
ALT SERPL W/O P-5'-P-CCNC: 27 U/L
ANION GAP SERPL CALC-SCNC: 10 MMOL/L
AST SERPL-CCNC: 26 U/L
BASOPHILS # BLD AUTO: 0.01 K/UL
BASOPHILS NFR BLD: 0.1 %
BILIRUB SERPL-MCNC: 0.5 MG/DL
BUN SERPL-MCNC: 32 MG/DL
CALCIUM SERPL-MCNC: 8.7 MG/DL
CHLORIDE SERPL-SCNC: 96 MMOL/L
CO2 SERPL-SCNC: 32 MMOL/L
CREAT SERPL-MCNC: 1 MG/DL
DIFFERENTIAL METHOD: ABNORMAL
EOSINOPHIL # BLD AUTO: 0 K/UL
EOSINOPHIL NFR BLD: 0.1 %
ERYTHROCYTE [DISTWIDTH] IN BLOOD BY AUTOMATED COUNT: 16.1 %
EST. GFR  (AFRICAN AMERICAN): >60 ML/MIN/1.73 M^2
EST. GFR  (NON AFRICAN AMERICAN): >60 ML/MIN/1.73 M^2
GLUCOSE SERPL-MCNC: 110 MG/DL
HCT VFR BLD AUTO: 43.3 %
HGB BLD-MCNC: 13.8 G/DL
IMM GRANULOCYTES # BLD AUTO: 0.05 K/UL
IMM GRANULOCYTES NFR BLD AUTO: 0.7 %
LYMPHOCYTES # BLD AUTO: 0.4 K/UL
LYMPHOCYTES NFR BLD: 5 %
MAGNESIUM SERPL-MCNC: 1.9 MG/DL
MCH RBC QN AUTO: 27.5 PG
MCHC RBC AUTO-ENTMCNC: 31.9 G/DL
MCV RBC AUTO: 86 FL
MONOCYTES # BLD AUTO: 0.7 K/UL
MONOCYTES NFR BLD: 9.9 %
NEUTROPHILS # BLD AUTO: 6.1 K/UL
NEUTROPHILS NFR BLD: 84.2 %
NRBC BLD-RTO: 0 /100 WBC
PLATELET # BLD AUTO: 136 K/UL
PMV BLD AUTO: 10.7 FL
POTASSIUM SERPL-SCNC: 4.1 MMOL/L
PROT SERPL-MCNC: 5.5 G/DL
RBC # BLD AUTO: 5.01 M/UL
SODIUM SERPL-SCNC: 138 MMOL/L
TACROLIMUS BLD-MCNC: 5.2 NG/ML
WBC # BLD AUTO: 7.24 K/UL

## 2018-07-21 PROCEDURE — 63600175 PHARM REV CODE 636 W HCPCS: Performed by: INTERNAL MEDICINE

## 2018-07-21 PROCEDURE — 36415 COLL VENOUS BLD VENIPUNCTURE: CPT

## 2018-07-21 PROCEDURE — 25000003 PHARM REV CODE 250: Performed by: INTERNAL MEDICINE

## 2018-07-21 PROCEDURE — 99233 SBSQ HOSP IP/OBS HIGH 50: CPT | Mod: ,,, | Performed by: INTERNAL MEDICINE

## 2018-07-21 PROCEDURE — 20600001 HC STEP DOWN PRIVATE ROOM

## 2018-07-21 PROCEDURE — 80053 COMPREHEN METABOLIC PANEL: CPT

## 2018-07-21 PROCEDURE — 25000003 PHARM REV CODE 250: Performed by: NURSE PRACTITIONER

## 2018-07-21 PROCEDURE — 83735 ASSAY OF MAGNESIUM: CPT

## 2018-07-21 PROCEDURE — 85025 COMPLETE CBC W/AUTO DIFF WBC: CPT

## 2018-07-21 PROCEDURE — 80197 ASSAY OF TACROLIMUS: CPT

## 2018-07-21 RX ORDER — FUROSEMIDE 40 MG/1
80 TABLET ORAL 2 TIMES DAILY
Status: DISCONTINUED | OUTPATIENT
Start: 2018-07-21 | End: 2018-07-23 | Stop reason: HOSPADM

## 2018-07-21 RX ADMIN — NYSTATIN 500000 UNITS: 500000 SUSPENSION ORAL at 08:07

## 2018-07-21 RX ADMIN — NYSTATIN 500000 UNITS: 500000 SUSPENSION ORAL at 05:07

## 2018-07-21 RX ADMIN — MAGNESIUM OXIDE TAB 400 MG (241.3 MG ELEMENTAL MG) 400 MG: 400 (241.3 MG) TAB at 08:07

## 2018-07-21 RX ADMIN — POTASSIUM CHLORIDE 40 MEQ: 1500 TABLET, EXTENDED RELEASE ORAL at 08:07

## 2018-07-21 RX ADMIN — ASPIRIN 81 MG: 81 TABLET, COATED ORAL at 09:07

## 2018-07-21 RX ADMIN — SULFAMETHOXAZOLE AND TRIMETHOPRIM 1 TABLET: 400; 80 TABLET ORAL at 09:07

## 2018-07-21 RX ADMIN — TACROLIMUS 2 MG: 1 CAPSULE ORAL at 09:07

## 2018-07-21 RX ADMIN — ATORVASTATIN CALCIUM 80 MG: 20 TABLET, FILM COATED ORAL at 09:07

## 2018-07-21 RX ADMIN — NYSTATIN 500000 UNITS: 500000 SUSPENSION ORAL at 12:07

## 2018-07-21 RX ADMIN — CLOPIDOGREL 75 MG: 75 TABLET, FILM COATED ORAL at 09:07

## 2018-07-21 RX ADMIN — NYSTATIN 500000 UNITS: 500000 SUSPENSION ORAL at 09:07

## 2018-07-21 RX ADMIN — FUROSEMIDE 80 MG: 40 TABLET ORAL at 09:07

## 2018-07-21 RX ADMIN — POTASSIUM CHLORIDE 40 MEQ: 1500 TABLET, EXTENDED RELEASE ORAL at 09:07

## 2018-07-21 RX ADMIN — TACROLIMUS 2 MG: 1 CAPSULE ORAL at 05:07

## 2018-07-21 RX ADMIN — MAGNESIUM OXIDE TAB 400 MG (241.3 MG ELEMENTAL MG) 400 MG: 400 (241.3 MG) TAB at 09:07

## 2018-07-21 RX ADMIN — FUROSEMIDE 80 MG: 40 TABLET ORAL at 05:07

## 2018-07-21 RX ADMIN — PREDNISONE 20 MG: 10 TABLET ORAL at 09:07

## 2018-07-21 NOTE — PLAN OF CARE
Problem: Patient Care Overview  Goal: Plan of Care Review  Outcome: Ongoing (interventions implemented as appropriate)  POC reviewed w/ pt this am to include lasix, accurate intake/output, and POC update.  Pt transitioned from IVP to PO lasix, tolerating well.  Pt's accuchecks d/c'd, pt no longer on high dose steroids.  Pt compliant with accurate intake and output.  Pt likely to d/c Monday per primary team.

## 2018-07-21 NOTE — PROGRESS NOTES
[facility]  Heart Transplant  Progress Note    Patient Name: Jamar Smiley  MRN: [id number]  Admission Date: 7/17/2018  Hospital Length of Stay: 4 days  Attending Physician: Jaja Chang MD  Primary Care Provider: Primary Doctor No  Principal Problem:Heart transplant failure and rejection    Subjective:     Interval History:     NAOE. Continuing to diurese and denies any SOB. Net negative 1.8 in the last 24hrs. Changed lasix to oral today. CAV positive in M- LAD    Continuous Infusions:  Scheduled Meds:   aspirin  81 mg Oral Daily    atorvastatin  80 mg Oral Daily    clopidogrel  75 mg Oral Daily    furosemide  80 mg Oral BID    magnesium oxide  400 mg Oral BID    nystatin  500,000 Units Oral QID (WM & HS)    potassium chloride SA  40 mEq Oral BID    predniSONE  20 mg Oral Daily    sulfamethoxazole-trimethoprim 400-80mg  1 tablet Oral Daily    tacrolimus  2 mg Oral BID     PRN Meds:dextrose 50%, dextrose 50%, glucagon (human recombinant), glucose, glucose, insulin aspart U-100, traMADol    Review of patient's allergies indicates:   Allergen Reactions    Contrast media Shortness Of Breath, Itching and Rash     Objective:     Vital Signs (Most Recent):  Temp: 97.4 °F (36.3 °C) (07/21/18 1142)  Pulse: 108 (07/21/18 1142)  Resp: 17 (07/21/18 1142)  BP: 117/78 (07/21/18 1142)  SpO2: 99 % (07/21/18 1142) Vital Signs (24h Range):  Temp:  [97.4 °F (36.3 °C)-98.4 °F (36.9 °C)] 97.4 °F (36.3 °C)  Pulse:  [] 108  Resp:  [17-20] 17  SpO2:  [96 %-100 %] 99 %  BP: (102-117)/(63-79) 117/78     Patient Vitals for the past 72 hrs (Last 3 readings):   Weight   07/21/18 0400 86.6 kg (190 lb 14.7 oz)   07/20/18 0600 86.6 kg (190 lb 14.7 oz)     Body mass index is 25.19 kg/m².      Intake/Output Summary (Last 24 hours) at 07/21/18 1409  Last data filed at 07/21/18 1200   Gross per 24 hour   Intake              240 ml   Output             1800 ml   Net            -1560 ml       Hemodynamic  Parameters:       Telemetry: reviewed and no event noted    Physical Exam   Constitutional: He is oriented to person, place, and time.   Poorly groomed   Neck: No JVD present.   Cardiovascular: Normal rate, regular rhythm and normal heart sounds.    Pulmonary/Chest: Effort normal and breath sounds normal.   Abdominal: Soft. Bowel sounds are normal.   Musculoskeletal: He exhibits no edema or tenderness.   Neurological: He is alert and oriented to person, place, and time.   Skin: Skin is warm and dry.   Nursing note and vitals reviewed.        Significant Labs:  CBC:    Recent Labs  Lab 07/19/18  0438 07/20/18  0549 07/21/18  0436   WBC 9.34 7.00 7.24   RBC 5.10 4.96 5.01   HGB 13.9* 13.5* 13.8*   HCT 43.3 42.8 43.3   * 127* 136*   MCV 85 86 86   MCH 27.3 27.2 27.5   MCHC 32.1 31.5* 31.9*     BNP:    Recent Labs  Lab 07/17/18  1035   *     CMP:    Recent Labs  Lab 07/19/18  0438 07/20/18  0549 07/21/18  0436   * 123* 110   CALCIUM 7.9* 7.9* 8.7   ALBUMIN 2.5* 2.5* 2.6*   PROT 5.3* 5.2* 5.5*    140 138   K 3.3* 3.7 4.1   CO2 33* 31* 32*   CL 96 96 96   BUN 23* 24* 32*   CREATININE 1.0 0.9 1.0   ALKPHOS 156* 155* 157*   ALT 15 18 27   AST 16 19 26   BILITOT 0.8 0.6 0.5      Coagulation:     Recent Labs  Lab 07/17/18  1308   INR 1.0     LDH:  No results for input(s): LDH in the last 72 hours.  Microbiology:  Microbiology Results (last 7 days)     ** No results found for the last 168 hours. **          I have reviewed all pertinent labs within the past 24 hours.    Estimated Creatinine Clearance: 123.2 mL/min (based on SCr of 1 mg/dL).    Diagnostic Results:          Assessment and Plan:     28 yo male, new to our service, s/p OHTx in 2010 at [facility] presented to the ED with ~ 2 week h/o worsening CORTEZ and JUN that extends up to scrotum. He relates that he cut ties with the [facility] program ~ 2-3 years ago after undergoing coronary PCI's to his transplanted heart. Was doing well until April when he  was hospitalized at [facility] for volume overload and was diuresed. Was seen by Dr. Holliday 7/6/18 and TTE on that day was 53%, RV mod enlarge, LA markedly dilated and mild to mod MR. TTE today shows decline in LVEF to 25-30%.    He admits that he takes his meds sporadically. Supposed to be on Prograf 1 mg bid. Also supposed to take Plavix for his PCI's, and says he takes Lasix 80 mg from time to time. Brought his pill bottles - all are out of date. He admits to smoking marijuana daily. 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 Normotensive.    * Heart transplant failure and rejection    - L/RHC done 7/17 showed significant CAD/CAV, no interventions done. RA: 30, PA: 62/40, W: 40, CO/CI 4.85/2.24. Unable to obtain EMBx via IJ approach - will need to do groin approach in the future for EMBx.  - Given 3 doses of Solu Medrol. continue Prednisone 20mg daily.  - DSA pending - will try to obtain donor antibodies from [facility] or [facility].  - Added OI prophylaxis given high dose steroids with Nystatin, Bactrim. Check CMV IgG to see if Valcyte needed.  - Continue diuresis with Lasix 80mg BID  - Continue Prograf (goal level 5-10). Level today is 5.2  - Was also on Rapamune with Prograf in the past, but stopped taking it when he couldn't get it for free.  - Continue Plavix, ASA and high intensity statin for h/o PCI's.        Hyperglycemia    - 2/2 IV steroids  - SSI        Acute on chronic combined systolic and diastolic heart failure    - 2/2 allograft rejection  - Continue diuresis as above and anti rejection as current  - net negative 1.8Liters overnight  - CAV Positive at mid-LAD with ISR        Marijuana smoker    - Tox screen +ve for THC, amphetamines          Discussed plan of care with Dr. Trinidad Moser MD  Heart Transplant  [facility]

## 2018-07-21 NOTE — PLAN OF CARE
Problem: Patient Care Overview  Goal: Plan of Care Review  Outcome: Ongoing (interventions implemented as appropriate)  AAOX4.  VSS.  No complaints of pain.  PIV is intact and free of infection.  Pt independent.  Lasix drip DC'd and now on lasix IV push.  Voiding per urinal.  Accuchecks AC, HS, last BS was 146.  Bed is in lowest position, call bell is in reach, and pt instructed to call nurse when needing assistance.  Will continue to monitor.

## 2018-07-21 NOTE — SUBJECTIVE & OBJECTIVE
Interval History:     NAOE. Continuing to diurese and denies any SOB. Net negative 1.8 in the last 24hrs. Changed lasix to oral today.    Continuous Infusions:  Scheduled Meds:   aspirin  81 mg Oral Daily    atorvastatin  80 mg Oral Daily    clopidogrel  75 mg Oral Daily    furosemide  80 mg Oral BID    magnesium oxide  400 mg Oral BID    nystatin  500,000 Units Oral QID (WM & HS)    potassium chloride SA  40 mEq Oral BID    predniSONE  20 mg Oral Daily    sulfamethoxazole-trimethoprim 400-80mg  1 tablet Oral Daily    tacrolimus  2 mg Oral BID     PRN Meds:dextrose 50%, dextrose 50%, glucagon (human recombinant), glucose, glucose, insulin aspart U-100, traMADol    Review of patient's allergies indicates:   Allergen Reactions    Contrast media Shortness Of Breath, Itching and Rash     Objective:     Vital Signs (Most Recent):  Temp: 97.4 °F (36.3 °C) (07/21/18 1142)  Pulse: 108 (07/21/18 1142)  Resp: 17 (07/21/18 1142)  BP: 117/78 (07/21/18 1142)  SpO2: 99 % (07/21/18 1142) Vital Signs (24h Range):  Temp:  [97.4 °F (36.3 °C)-98.4 °F (36.9 °C)] 97.4 °F (36.3 °C)  Pulse:  [] 108  Resp:  [17-20] 17  SpO2:  [96 %-100 %] 99 %  BP: (102-117)/(63-79) 117/78     Patient Vitals for the past 72 hrs (Last 3 readings):   Weight   07/21/18 0400 86.6 kg (190 lb 14.7 oz)   07/20/18 0600 86.6 kg (190 lb 14.7 oz)     Body mass index is 25.19 kg/m².      Intake/Output Summary (Last 24 hours) at 07/21/18 1409  Last data filed at 07/21/18 1200   Gross per 24 hour   Intake              240 ml   Output             1800 ml   Net            -1560 ml       Hemodynamic Parameters:       Telemetry: reviewed and no event noted    Physical Exam   Constitutional: He is oriented to person, place, and time.   Poorly groomed   Neck: No JVD present.   Cardiovascular: Normal rate, regular rhythm and normal heart sounds.    Pulmonary/Chest: Effort normal and breath sounds normal.   Abdominal: Soft. Bowel sounds are normal.    Musculoskeletal: He exhibits no edema or tenderness.   Neurological: He is alert and oriented to person, place, and time.   Skin: Skin is warm and dry.   Nursing note and vitals reviewed.        Significant Labs:  CBC:    Recent Labs  Lab 07/19/18  0438 07/20/18  0549 07/21/18  0436   WBC 9.34 7.00 7.24   RBC 5.10 4.96 5.01   HGB 13.9* 13.5* 13.8*   HCT 43.3 42.8 43.3   * 127* 136*   MCV 85 86 86   MCH 27.3 27.2 27.5   MCHC 32.1 31.5* 31.9*     BNP:    Recent Labs  Lab 07/17/18  1035   *     CMP:    Recent Labs  Lab 07/19/18  0438 07/20/18  0549 07/21/18  0436   * 123* 110   CALCIUM 7.9* 7.9* 8.7   ALBUMIN 2.5* 2.5* 2.6*   PROT 5.3* 5.2* 5.5*    140 138   K 3.3* 3.7 4.1   CO2 33* 31* 32*   CL 96 96 96   BUN 23* 24* 32*   CREATININE 1.0 0.9 1.0   ALKPHOS 156* 155* 157*   ALT 15 18 27   AST 16 19 26   BILITOT 0.8 0.6 0.5      Coagulation:     Recent Labs  Lab 07/17/18  1308   INR 1.0     LDH:  No results for input(s): LDH in the last 72 hours.  Microbiology:  Microbiology Results (last 7 days)     ** No results found for the last 168 hours. **          I have reviewed all pertinent labs within the past 24 hours.    Estimated Creatinine Clearance: 123.2 mL/min (based on SCr of 1 mg/dL).    Diagnostic Results:

## 2018-07-21 NOTE — ASSESSMENT & PLAN NOTE
- 2/2 allograft rejection  - Continue diuresis as above and anti rejection as current  - net negative 1.8Liters overnight  - CAV Positive at mid-LAD with ISR

## 2018-07-21 NOTE — ASSESSMENT & PLAN NOTE
- L/RHC done 7/17 showed significant CAD/CAV, no interventions done. RA: 30, PA: 62/40, W: 40, CO/CI 4.85/2.24. Unable to obtain EMBx via IJ approach - will need to do groin approach in the future for EMBx.  - Given 3 doses of Solu Medrol. continue Prednisone 20mg daily.  - DSA pending - will try to obtain donor antibodies from [facility] or [facility].  - Added OI prophylaxis given high dose steroids with Nystatin, Bactrim. Check CMV IgG to see if Valcyte needed.  - Continue diuresis with Lasix 80mg BID  - Continue Prograf (goal level 5-10). Level today is 5.2  - Was also on Rapamune with Prograf in the past, but stopped taking it when he couldn't get it for free.  - Continue Plavix, ASA and high intensity statin for h/o PCI's.

## 2018-07-22 LAB
ALBUMIN SERPL BCP-MCNC: 3 G/DL
ALP SERPL-CCNC: 185 U/L
ALT SERPL W/O P-5'-P-CCNC: 50 U/L
ANION GAP SERPL CALC-SCNC: 11 MMOL/L
AST SERPL-CCNC: 42 U/L
BASOPHILS # BLD AUTO: 0.01 K/UL
BASOPHILS NFR BLD: 0.1 %
BILIRUB SERPL-MCNC: 0.9 MG/DL
BUN SERPL-MCNC: 36 MG/DL
CALCIUM SERPL-MCNC: 9.2 MG/DL
CHLORIDE SERPL-SCNC: 97 MMOL/L
CO2 SERPL-SCNC: 30 MMOL/L
CREAT SERPL-MCNC: 1.2 MG/DL
DIFFERENTIAL METHOD: ABNORMAL
EOSINOPHIL # BLD AUTO: 0.1 K/UL
EOSINOPHIL NFR BLD: 1.4 %
ERYTHROCYTE [DISTWIDTH] IN BLOOD BY AUTOMATED COUNT: 16.2 %
EST. GFR  (AFRICAN AMERICAN): >60 ML/MIN/1.73 M^2
EST. GFR  (NON AFRICAN AMERICAN): >60 ML/MIN/1.73 M^2
GLUCOSE SERPL-MCNC: 87 MG/DL
HCT VFR BLD AUTO: 48.9 %
HGB BLD-MCNC: 15 G/DL
IMM GRANULOCYTES # BLD AUTO: 0.04 K/UL
IMM GRANULOCYTES NFR BLD AUTO: 0.5 %
LYMPHOCYTES # BLD AUTO: 0.4 K/UL
LYMPHOCYTES NFR BLD: 5.4 %
MAGNESIUM SERPL-MCNC: 2.3 MG/DL
MCH RBC QN AUTO: 26.7 PG
MCHC RBC AUTO-ENTMCNC: 30.7 G/DL
MCV RBC AUTO: 87 FL
MONOCYTES # BLD AUTO: 0.7 K/UL
MONOCYTES NFR BLD: 8.3 %
NEUTROPHILS # BLD AUTO: 6.7 K/UL
NEUTROPHILS NFR BLD: 84.3 %
NRBC BLD-RTO: 0 /100 WBC
PLATELET # BLD AUTO: 144 K/UL
PMV BLD AUTO: 10 FL
POTASSIUM SERPL-SCNC: 5 MMOL/L
POTASSIUM SERPL-SCNC: 5.3 MMOL/L
PROT SERPL-MCNC: 6 G/DL
RBC # BLD AUTO: 5.62 M/UL
SODIUM SERPL-SCNC: 138 MMOL/L
TACROLIMUS BLD-MCNC: 7.5 NG/ML
WBC # BLD AUTO: 7.91 K/UL

## 2018-07-22 PROCEDURE — 25000003 PHARM REV CODE 250: Performed by: INTERNAL MEDICINE

## 2018-07-22 PROCEDURE — 80053 COMPREHEN METABOLIC PANEL: CPT

## 2018-07-22 PROCEDURE — 99233 SBSQ HOSP IP/OBS HIGH 50: CPT | Mod: ,,, | Performed by: INTERNAL MEDICINE

## 2018-07-22 PROCEDURE — 20600001 HC STEP DOWN PRIVATE ROOM

## 2018-07-22 PROCEDURE — 63600175 PHARM REV CODE 636 W HCPCS: Performed by: INTERNAL MEDICINE

## 2018-07-22 PROCEDURE — 36415 COLL VENOUS BLD VENIPUNCTURE: CPT

## 2018-07-22 PROCEDURE — 83735 ASSAY OF MAGNESIUM: CPT

## 2018-07-22 PROCEDURE — 25000003 PHARM REV CODE 250: Performed by: NURSE PRACTITIONER

## 2018-07-22 PROCEDURE — 84132 ASSAY OF SERUM POTASSIUM: CPT

## 2018-07-22 PROCEDURE — 80197 ASSAY OF TACROLIMUS: CPT

## 2018-07-22 PROCEDURE — 85025 COMPLETE CBC W/AUTO DIFF WBC: CPT

## 2018-07-22 RX ORDER — ACETAMINOPHEN 325 MG/1
650 TABLET ORAL EVERY 8 HOURS PRN
Status: DISCONTINUED | OUTPATIENT
Start: 2018-07-22 | End: 2018-07-23 | Stop reason: HOSPADM

## 2018-07-22 RX ADMIN — NYSTATIN 500000 UNITS: 500000 SUSPENSION ORAL at 11:07

## 2018-07-22 RX ADMIN — FUROSEMIDE 80 MG: 40 TABLET ORAL at 05:07

## 2018-07-22 RX ADMIN — NYSTATIN 500000 UNITS: 500000 SUSPENSION ORAL at 08:07

## 2018-07-22 RX ADMIN — POTASSIUM CHLORIDE 40 MEQ: 1500 TABLET, EXTENDED RELEASE ORAL at 08:07

## 2018-07-22 RX ADMIN — PREDNISONE 20 MG: 10 TABLET ORAL at 08:07

## 2018-07-22 RX ADMIN — MAGNESIUM OXIDE TAB 400 MG (241.3 MG ELEMENTAL MG) 400 MG: 400 (241.3 MG) TAB at 08:07

## 2018-07-22 RX ADMIN — ATORVASTATIN CALCIUM 80 MG: 20 TABLET, FILM COATED ORAL at 08:07

## 2018-07-22 RX ADMIN — CLOPIDOGREL 75 MG: 75 TABLET, FILM COATED ORAL at 08:07

## 2018-07-22 RX ADMIN — TRAMADOL HYDROCHLORIDE 50 MG: 50 TABLET, COATED ORAL at 11:07

## 2018-07-22 RX ADMIN — ASPIRIN 81 MG: 81 TABLET, COATED ORAL at 08:07

## 2018-07-22 RX ADMIN — ACETAMINOPHEN 650 MG: 325 TABLET, FILM COATED ORAL at 08:07

## 2018-07-22 RX ADMIN — NYSTATIN 500000 UNITS: 500000 SUSPENSION ORAL at 04:07

## 2018-07-22 RX ADMIN — TACROLIMUS 2 MG: 1 CAPSULE ORAL at 08:07

## 2018-07-22 RX ADMIN — TACROLIMUS 2 MG: 1 CAPSULE ORAL at 05:07

## 2018-07-22 RX ADMIN — FUROSEMIDE 80 MG: 40 TABLET ORAL at 08:07

## 2018-07-22 RX ADMIN — SULFAMETHOXAZOLE AND TRIMETHOPRIM 1 TABLET: 400; 80 TABLET ORAL at 08:07

## 2018-07-22 NOTE — PLAN OF CARE
Problem: Patient Care Overview  Goal: Plan of Care Review  Outcome: Ongoing (interventions implemented as appropriate)  AAOX4.  VSS.  No complaints of pain.  PIV is intact and free of infection.  Bed is in lowest position, call bell is in reach, and pt instructed to call nurse when needing assistance.  Will continue to monitor.

## 2018-07-22 NOTE — ASSESSMENT & PLAN NOTE
- L/RHC done 7/17 showed significant CAD/CAV, no interventions done. RA: 30, PA: 62/40, W: 40, CO/CI 4.85/2.24. Unable to obtain EMBx via IJ approach - will need to do groin approach in the future for EMBx.  - Given 3 doses of Solu Medrol. continue Prednisone 20mg daily.  - DSA pending - will try to obtain donor antibodies from [facility] or [facility].  - Continue diuresis with Lasix 80mg po BID   - Continue Prograf (goal level 5-10). Level today is 4.3. Will get Pharmacy to adjust dose tomorrow  - Was also on Rapamune with Prograf in the past, but stopped taking it when he couldn't get it for free.  - Continue Plavix, ASA and high intensity statin for h/o PCI's.

## 2018-07-22 NOTE — SUBJECTIVE & OBJECTIVE
Interval History:     Continuing to diurese. Net negative 1.4 L for the last 24 hrs after changing lasix to po. Complained of mild headache this morning that responded to tylenol. Want to discuss with  about getting on disability    Continuous Infusions:  Scheduled Meds:   aspirin  81 mg Oral Daily    atorvastatin  80 mg Oral Daily    clopidogrel  75 mg Oral Daily    furosemide  80 mg Oral BID    magnesium oxide  400 mg Oral BID    nystatin  500,000 Units Oral QID (WM & HS)    predniSONE  20 mg Oral Daily    sulfamethoxazole-trimethoprim 400-80mg  1 tablet Oral Daily    tacrolimus  2 mg Oral BID     PRN Meds:acetaminophen, dextrose 50%, dextrose 50%, glucagon (human recombinant), glucose, glucose, insulin aspart U-100, traMADol    Review of patient's allergies indicates:   Allergen Reactions    Contrast media Shortness Of Breath, Itching and Rash     Objective:     Vital Signs (Most Recent):  Temp: 97.9 °F (36.6 °C) (07/22/18 1136)  Pulse: 108 (07/22/18 1136)  Resp: 18 (07/22/18 1136)  BP: 108/83 (07/22/18 1136)  SpO2: 99 % (07/22/18 1136) Vital Signs (24h Range):  Temp:  [97.4 °F (36.3 °C)-98 °F (36.7 °C)] 97.9 °F (36.6 °C)  Pulse:  [] 108  Resp:  [17-20] 18  SpO2:  [94 %-100 %] 99 %  BP: (108-124)/(73-89) 108/83     Patient Vitals for the past 72 hrs (Last 3 readings):   Weight   07/22/18 0400 85.7 kg (188 lb 15 oz)   07/21/18 0400 86.6 kg (190 lb 14.7 oz)   07/20/18 0600 86.6 kg (190 lb 14.7 oz)     Body mass index is 24.93 kg/m².      Intake/Output Summary (Last 24 hours) at 07/22/18 1330  Last data filed at 07/22/18 0500   Gross per 24 hour   Intake              600 ml   Output             1600 ml   Net            -1000 ml       Hemodynamic Parameters:       Telemetry: no event noted    Physical Exam    Constitutional: He is oriented to person, place, and time.   Poorly groomed   Neck: No JVD present.   Cardiovascular: Normal rate, regular rhythm and normal heart sounds.     Pulmonary/Chest: Effort normal and breath sounds normal.   Abdominal: Soft. Bowel sounds are normal.   Musculoskeletal: He exhibits +2 LE edema. Mild pedal coldness   Neurological: He is alert and oriented to person, place, and time.   Skin: Skin is warm and dry.   Nursing note and vitals reviewed.     Significant Labs:  CBC:    Recent Labs  Lab 07/20/18  0549 07/21/18  0436 07/22/18  0602   WBC 7.00 7.24 7.91   RBC 4.96 5.01 5.62   HGB 13.5* 13.8* 15.0   HCT 42.8 43.3 48.9   * 136* 144*   MCV 86 86 87   MCH 27.2 27.5 26.7*   MCHC 31.5* 31.9* 30.7*     BNP:    Recent Labs  Lab 07/17/18  1035   *     CMP:    Recent Labs  Lab 07/20/18  0549 07/21/18  0436 07/22/18  0602   * 110 87   CALCIUM 7.9* 8.7 9.2   ALBUMIN 2.5* 2.6* 3.0*   PROT 5.2* 5.5* 6.0    138 138   K 3.7 4.1 5.3*   CO2 31* 32* 30*   CL 96 96 97   BUN 24* 32* 36*   CREATININE 0.9 1.0 1.2   ALKPHOS 155* 157* 185*   ALT 18 27 50*   AST 19 26 42*   BILITOT 0.6 0.5 0.9      Coagulation:     Recent Labs  Lab 07/17/18  1308   INR 1.0     LDH:  No results for input(s): LDH in the last 72 hours.  Microbiology:  Microbiology Results (last 7 days)     ** No results found for the last 168 hours. **          I have reviewed all pertinent labs within the past 24 hours.    Estimated Creatinine Clearance: 102.6 mL/min (based on SCr of 1.2 mg/dL).    Diagnostic Results:

## 2018-07-22 NOTE — PROGRESS NOTES
[facility]  Heart Transplant  Progress Note    Patient Name: Jamar Smiley  MRN: [id number]  Admission Date: 7/17/2018  Hospital Length of Stay: 5 days  Attending Physician: Jaja Chang MD  Primary Care Provider: Primary Doctor No  Principal Problem:Heart transplant failure and rejection    Subjective:     Interval History:     Continuing to diurese. Net negative 1.4 L for the last 24 hrs after changing lasix to po. Complained of mild headache this morning that responded to tylenol. Want to discuss with  about getting on disability    Continuous Infusions:  Scheduled Meds:   aspirin  81 mg Oral Daily    atorvastatin  80 mg Oral Daily    clopidogrel  75 mg Oral Daily    furosemide  80 mg Oral BID    magnesium oxide  400 mg Oral BID    nystatin  500,000 Units Oral QID (WM & HS)    predniSONE  20 mg Oral Daily    sulfamethoxazole-trimethoprim 400-80mg  1 tablet Oral Daily    tacrolimus  2 mg Oral BID     PRN Meds:acetaminophen, dextrose 50%, dextrose 50%, glucagon (human recombinant), glucose, glucose, insulin aspart U-100, traMADol    Review of patient's allergies indicates:   Allergen Reactions    Contrast media Shortness Of Breath, Itching and Rash     Objective:     Vital Signs (Most Recent):  Temp: 97.9 °F (36.6 °C) (07/22/18 1136)  Pulse: 108 (07/22/18 1136)  Resp: 18 (07/22/18 1136)  BP: 108/83 (07/22/18 1136)  SpO2: 99 % (07/22/18 1136) Vital Signs (24h Range):  Temp:  [97.4 °F (36.3 °C)-98 °F (36.7 °C)] 97.9 °F (36.6 °C)  Pulse:  [] 108  Resp:  [17-20] 18  SpO2:  [94 %-100 %] 99 %  BP: (108-124)/(73-89) 108/83     Patient Vitals for the past 72 hrs (Last 3 readings):   Weight   07/22/18 0400 85.7 kg (188 lb 15 oz)   07/21/18 0400 86.6 kg (190 lb 14.7 oz)   07/20/18 0600 86.6 kg (190 lb 14.7 oz)     Body mass index is 24.93 kg/m².      Intake/Output Summary (Last 24 hours) at 07/22/18 1330  Last data filed at 07/22/18 0500   Gross per 24 hour   Intake               600 ml   Output             1600 ml   Net            -1000 ml       Hemodynamic Parameters:       Telemetry: no event noted    Physical Exam    Constitutional: He is oriented to person, place, and time.   Poorly groomed   Neck: No JVD present.   Cardiovascular: Normal rate, regular rhythm and normal heart sounds.    Pulmonary/Chest: Effort normal and breath sounds normal.   Abdominal: Soft. Bowel sounds are normal.   Musculoskeletal: He exhibits +2 LE edema. Mild pedal coldness   Neurological: He is alert and oriented to person, place, and time.   Skin: Skin is warm and dry.   Nursing note and vitals reviewed.     Significant Labs:  CBC:    Recent Labs  Lab 07/20/18  0549 07/21/18  0436 07/22/18  0602   WBC 7.00 7.24 7.91   RBC 4.96 5.01 5.62   HGB 13.5* 13.8* 15.0   HCT 42.8 43.3 48.9   * 136* 144*   MCV 86 86 87   MCH 27.2 27.5 26.7*   MCHC 31.5* 31.9* 30.7*     BNP:    Recent Labs  Lab 07/17/18  1035   *     CMP:    Recent Labs  Lab 07/20/18  0549 07/21/18  0436 07/22/18  0602   * 110 87   CALCIUM 7.9* 8.7 9.2   ALBUMIN 2.5* 2.6* 3.0*   PROT 5.2* 5.5* 6.0    138 138   K 3.7 4.1 5.3*   CO2 31* 32* 30*   CL 96 96 97   BUN 24* 32* 36*   CREATININE 0.9 1.0 1.2   ALKPHOS 155* 157* 185*   ALT 18 27 50*   AST 19 26 42*   BILITOT 0.6 0.5 0.9      Coagulation:     Recent Labs  Lab 07/17/18  1308   INR 1.0     LDH:  No results for input(s): LDH in the last 72 hours.  Microbiology:  Microbiology Results (last 7 days)     ** No results found for the last 168 hours. **          I have reviewed all pertinent labs within the past 24 hours.    Estimated Creatinine Clearance: 102.6 mL/min (based on SCr of 1.2 mg/dL).    Diagnostic Results:        Assessment and Plan:     28 yo male, new to our service, s/p OHTx in 2010 at [facility] presented to the ED with ~ 2 week h/o worsening CORTEZ and JUN that extends up to scrotum. He relates that he cut ties with the [facility] program ~ 2-3 years ago after  undergoing coronary PCI's to his transplanted heart. Was doing well until April when he was hospitalized at [facility] for volume overload and was diuresed. Was seen by Dr. Holliday 7/6/18 and TTE on that day was 53%, RV mod enlarge, LA markedly dilated and mild to mod MR. TTE today shows decline in LVEF to 25-30%.    He admits that he takes his meds sporadically. Supposed to be on Prograf 1 mg bid. Also supposed to take Plavix for his PCI's, and says he takes Lasix 80 mg from time to time. Brought his pill bottles - all are out of date. He admits to smoking marijuana daily. 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 Normotensive.    * Heart transplant failure and rejection    - L/RHC done 7/17 showed significant CAD/CAV, no interventions done. RA: 30, PA: 62/40, W: 40, CO/CI 4.85/2.24. Unable to obtain EMBx via IJ approach - will need to do groin approach in the future for EMBx.  - Given 3 doses of Solu Medrol. continue Prednisone 20mg daily.  - DSA pending - will try to obtain donor antibodies from [facility] or [facility].  - Continue diuresis with Lasix 80mg po BID   - Continue Prograf (goal level 5-10). Level today is 4.3. Will get Pharmacy to adjust dose tomorrow  - Was also on Rapamune with Prograf in the past, but stopped taking it when he couldn't get it for free.  - Continue Plavix, ASA and high intensity statin for h/o PCI's.        Noncompliance with medications    - previously non compliance and has positive Illicit drug use  - report that he cannot work anymore and he is considering to get on disability  - will get  to review the patient request tomorrow        Hyperglycemia    - 2/2 IV steroids  - SSI        Acute on chronic combined systolic and diastolic heart failure    - 2/2 allograft rejection  - Continue diuresis as above and anti rejection as current  - net negative 1.4Liters overnight  - CAV Positive at mid-LAD with ISR  - Discontinued potassium  replacement due to mild hyperkalemia on am labs        Marijuana smoker    - Tox screen +ve for THC, amphetamines          Discussed plan of care with Dr. Trinidad Moser MD  Heart Transplant  [facility]

## 2018-07-22 NOTE — ASSESSMENT & PLAN NOTE
- 2/2 allograft rejection  - Continue diuresis as above and anti rejection as current  - net negative 1.4Liters overnight  - CAV Positive at mid-LAD with ISR

## 2018-07-22 NOTE — ASSESSMENT & PLAN NOTE
- previously non compliance and has positive Illicit drug use  - report that he cannot work anymore and he is considering to get on disability  - will get  to review the patient request tomorrow

## 2018-07-23 VITALS
TEMPERATURE: 96 F | WEIGHT: 190 LBS | OXYGEN SATURATION: 99 % | BODY MASS INDEX: 25.18 KG/M2 | RESPIRATION RATE: 17 BRPM | HEIGHT: 73 IN | SYSTOLIC BLOOD PRESSURE: 117 MMHG | DIASTOLIC BLOOD PRESSURE: 64 MMHG | HEART RATE: 109 BPM

## 2018-07-23 LAB
ALBUMIN SERPL BCP-MCNC: 2.7 G/DL
ALP SERPL-CCNC: 166 U/L
ALT SERPL W/O P-5'-P-CCNC: 48 U/L
ANION GAP SERPL CALC-SCNC: 6 MMOL/L
AST SERPL-CCNC: 31 U/L
BASOPHILS # BLD AUTO: 0.02 K/UL
BASOPHILS NFR BLD: 0.3 %
BILIRUB SERPL-MCNC: 1.1 MG/DL
BUN SERPL-MCNC: 30 MG/DL
CALCIUM SERPL-MCNC: 8.9 MG/DL
CHLORIDE SERPL-SCNC: 97 MMOL/L
CO2 SERPL-SCNC: 34 MMOL/L
CREAT SERPL-MCNC: 1.1 MG/DL
DIFFERENTIAL METHOD: ABNORMAL
EOSINOPHIL # BLD AUTO: 0.2 K/UL
EOSINOPHIL NFR BLD: 2.7 %
ERYTHROCYTE [DISTWIDTH] IN BLOOD BY AUTOMATED COUNT: 16.1 %
EST. GFR  (AFRICAN AMERICAN): >60 ML/MIN/1.73 M^2
EST. GFR  (NON AFRICAN AMERICAN): >60 ML/MIN/1.73 M^2
GLUCOSE SERPL-MCNC: 83 MG/DL
HCT VFR BLD AUTO: 45 %
HGB BLD-MCNC: 14.4 G/DL
IMM GRANULOCYTES # BLD AUTO: 0.08 K/UL
IMM GRANULOCYTES NFR BLD AUTO: 1.1 %
LYMPHOCYTES # BLD AUTO: 0.5 K/UL
LYMPHOCYTES NFR BLD: 6.5 %
MAGNESIUM SERPL-MCNC: 1.9 MG/DL
MCH RBC QN AUTO: 27.4 PG
MCHC RBC AUTO-ENTMCNC: 32 G/DL
MCV RBC AUTO: 86 FL
MITRAL VALVE REGURGITATION: ABNORMAL
MONOCYTES # BLD AUTO: 0.6 K/UL
MONOCYTES NFR BLD: 8.2 %
NEUTROPHILS # BLD AUTO: 5.8 K/UL
NEUTROPHILS NFR BLD: 81.2 %
NRBC BLD-RTO: 0 /100 WBC
PLATELET # BLD AUTO: 136 K/UL
PMV BLD AUTO: 10.6 FL
POTASSIUM SERPL-SCNC: 5 MMOL/L
PROT SERPL-MCNC: 5.3 G/DL
RBC # BLD AUTO: 5.25 M/UL
RETIRED EF AND QEF - SEE NOTES: 25 (ref 55–65)
SODIUM SERPL-SCNC: 137 MMOL/L
TACROLIMUS BLD-MCNC: 7.2 NG/ML
TRICUSPID VALVE REGURGITATION: ABNORMAL
WBC # BLD AUTO: 7.11 K/UL

## 2018-07-23 PROCEDURE — 25000003 PHARM REV CODE 250: Performed by: INTERNAL MEDICINE

## 2018-07-23 PROCEDURE — 99238 HOSP IP/OBS DSCHRG MGMT 30/<: CPT | Mod: ,,, | Performed by: INTERNAL MEDICINE

## 2018-07-23 PROCEDURE — 36415 COLL VENOUS BLD VENIPUNCTURE: CPT

## 2018-07-23 PROCEDURE — 25000003 PHARM REV CODE 250: Performed by: NURSE PRACTITIONER

## 2018-07-23 PROCEDURE — 93306 TTE W/DOPPLER COMPLETE: CPT | Mod: 26,,, | Performed by: INTERNAL MEDICINE

## 2018-07-23 PROCEDURE — 85025 COMPLETE CBC W/AUTO DIFF WBC: CPT

## 2018-07-23 PROCEDURE — 83735 ASSAY OF MAGNESIUM: CPT

## 2018-07-23 PROCEDURE — 99232 SBSQ HOSP IP/OBS MODERATE 35: CPT | Mod: ,,, | Performed by: INTERNAL MEDICINE

## 2018-07-23 PROCEDURE — 93306 TTE W/DOPPLER COMPLETE: CPT

## 2018-07-23 PROCEDURE — 63600175 PHARM REV CODE 636 W HCPCS: Performed by: INTERNAL MEDICINE

## 2018-07-23 PROCEDURE — 80053 COMPREHEN METABOLIC PANEL: CPT

## 2018-07-23 PROCEDURE — 80197 ASSAY OF TACROLIMUS: CPT

## 2018-07-23 RX ORDER — MYCOPHENOLATE MOFETIL 250 MG/1
500 CAPSULE ORAL 2 TIMES DAILY
Qty: 120 CAPSULE | Refills: 11 | Status: SHIPPED | OUTPATIENT
Start: 2018-07-23 | End: 2018-11-09

## 2018-07-23 RX ORDER — FUROSEMIDE 80 MG/1
80 TABLET ORAL 2 TIMES DAILY
Qty: 60 TABLET | Refills: 11 | Status: SHIPPED | OUTPATIENT
Start: 2018-07-23 | End: 2019-08-19 | Stop reason: SDUPTHER

## 2018-07-23 RX ORDER — ASPIRIN 81 MG/1
81 TABLET ORAL DAILY
Qty: 30 TABLET | Refills: 11 | Status: SHIPPED | OUTPATIENT
Start: 2018-07-24 | End: 2020-01-01 | Stop reason: SDUPTHER

## 2018-07-23 RX ORDER — PREDNISONE 10 MG/1
10 TABLET ORAL DAILY
Qty: 30 TABLET | Refills: 11 | Status: SHIPPED | OUTPATIENT
Start: 2018-07-24 | End: 2019-08-19 | Stop reason: SDUPTHER

## 2018-07-23 RX ORDER — LANOLIN ALCOHOL/MO/W.PET/CERES
400 CREAM (GRAM) TOPICAL 2 TIMES DAILY
Refills: 0 | COMMUNITY
Start: 2018-07-23 | End: 2020-01-01 | Stop reason: SDUPTHER

## 2018-07-23 RX ORDER — NYSTATIN 100000 [USP'U]/ML
500000 SUSPENSION ORAL
Qty: 473 ML | Refills: 1 | Status: SHIPPED | OUTPATIENT
Start: 2018-07-23 | End: 2018-11-09 | Stop reason: ALTCHOICE

## 2018-07-23 RX ORDER — SULFAMETHOXAZOLE AND TRIMETHOPRIM 400; 80 MG/1; MG/1
1 TABLET ORAL DAILY
Qty: 30 TABLET | Refills: 5 | Status: SHIPPED | OUTPATIENT
Start: 2018-07-24 | End: 2020-01-01 | Stop reason: SDUPTHER

## 2018-07-23 RX ORDER — ATORVASTATIN CALCIUM 80 MG/1
80 TABLET, FILM COATED ORAL DAILY
Qty: 90 TABLET | Refills: 3 | Status: SHIPPED | OUTPATIENT
Start: 2018-07-24 | End: 2019-08-19 | Stop reason: SDUPTHER

## 2018-07-23 RX ORDER — TACROLIMUS 1 MG/1
2 CAPSULE ORAL EVERY 12 HOURS
Qty: 120 CAPSULE | Refills: 11 | Status: SHIPPED | OUTPATIENT
Start: 2018-07-23 | End: 2019-08-19 | Stop reason: SDUPTHER

## 2018-07-23 RX ORDER — FAMOTIDINE 40 MG/1
40 TABLET, FILM COATED ORAL NIGHTLY
Qty: 30 TABLET | Refills: 11 | Status: ON HOLD | OUTPATIENT
Start: 2018-07-23 | End: 2018-11-27 | Stop reason: CLARIF

## 2018-07-23 RX ORDER — MYCOPHENOLATE MOFETIL 250 MG/1
500 CAPSULE ORAL 2 TIMES DAILY
Status: DISCONTINUED | OUTPATIENT
Start: 2018-07-23 | End: 2018-07-23 | Stop reason: HOSPADM

## 2018-07-23 RX ORDER — CLOPIDOGREL BISULFATE 75 MG/1
75 TABLET ORAL DAILY
Qty: 30 TABLET | Refills: 11 | Status: SHIPPED | OUTPATIENT
Start: 2018-07-23 | End: 2019-08-19 | Stop reason: SDUPTHER

## 2018-07-23 RX ADMIN — NYSTATIN 500000 UNITS: 500000 SUSPENSION ORAL at 08:07

## 2018-07-23 RX ADMIN — TACROLIMUS 2 MG: 1 CAPSULE ORAL at 08:07

## 2018-07-23 RX ADMIN — NYSTATIN 500000 UNITS: 500000 SUSPENSION ORAL at 12:07

## 2018-07-23 RX ADMIN — PREDNISONE 20 MG: 10 TABLET ORAL at 08:07

## 2018-07-23 RX ADMIN — MYCOPHENOLATE MOFETIL 500 MG: 250 CAPSULE ORAL at 10:07

## 2018-07-23 RX ADMIN — ASPIRIN 81 MG: 81 TABLET, COATED ORAL at 08:07

## 2018-07-23 RX ADMIN — TRAMADOL HYDROCHLORIDE 50 MG: 50 TABLET, COATED ORAL at 10:07

## 2018-07-23 RX ADMIN — ATORVASTATIN CALCIUM 80 MG: 20 TABLET, FILM COATED ORAL at 08:07

## 2018-07-23 RX ADMIN — MAGNESIUM OXIDE TAB 400 MG (241.3 MG ELEMENTAL MG) 400 MG: 400 (241.3 MG) TAB at 08:07

## 2018-07-23 RX ADMIN — CLOPIDOGREL 75 MG: 75 TABLET, FILM COATED ORAL at 08:07

## 2018-07-23 RX ADMIN — SULFAMETHOXAZOLE AND TRIMETHOPRIM 1 TABLET: 400; 80 TABLET ORAL at 08:07

## 2018-07-23 RX ADMIN — FUROSEMIDE 80 MG: 40 TABLET ORAL at 08:07

## 2018-07-23 NOTE — NURSING
Reviewed pt's discharge instructions with patient. PIV removed. Tele d/c'd. Pt verbalized understanding of instructions.

## 2018-07-23 NOTE — PROGRESS NOTES
D/C note:    SW to pt's room for D/C. Pt aaox4, calm, and pleasant. Migel HookerD present and reviewing medications with pt. Pt reports coping well today. Pt reports in agreement with plan to D/C home today. Pt reports plan to follow with [facility] instead of [facility] in the future. Adherence education provided. Pt reports understanding the need for adherence to health care regimen. Pt reports he intends to be adherent to all of MD's directions in the future. Pt reports parents will provide transport home today. Pt reports no other needs at this time. SW providing psychosocial and counseling support, education, assistance, resources, and D/C planning as indicated. SW remains available.

## 2018-07-23 NOTE — ASSESSMENT & PLAN NOTE
- previously non compliance and has positive Illicit drug use.  - report that he cannot work anymore and he is considering to get on disability.

## 2018-07-23 NOTE — ASSESSMENT & PLAN NOTE
- 2/2 allograft rejection.  - Continue diuresis as above and anti rejection as current.  - net negative overnight.  - CAV Positive at mid-LAD with ISR.

## 2018-07-23 NOTE — PROGRESS NOTES
[facility]  Heart Transplant  Progress Note    Patient Name: Jamar Smiley  MRN: [id number]  Admission Date: 7/17/2018  Hospital Length of Stay: 6 days  Attending Physician: Jaja Chang MD  Primary Care Provider: Primary Doctor No  Principal Problem:Heart transplant failure and rejection    Subjective:     Interval History: No complaints this am. Feels well.     Continuous Infusions:    Scheduled Meds:   aspirin  81 mg Oral Daily    atorvastatin  80 mg Oral Daily    clopidogrel  75 mg Oral Daily    furosemide  80 mg Oral BID    magnesium oxide  400 mg Oral BID    mycophenolate  500 mg Oral BID    nystatin  500,000 Units Oral QID (WM & HS)    predniSONE  20 mg Oral Daily    sulfamethoxazole-trimethoprim 400-80mg  1 tablet Oral Daily    tacrolimus  2 mg Oral BID     PRN Meds:acetaminophen, dextrose 50%, dextrose 50%, glucagon (human recombinant), glucose, glucose, insulin aspart U-100, traMADol    Review of patient's allergies indicates:   Allergen Reactions    Contrast media Shortness Of Breath, Itching and Rash     Objective:     Vital Signs (Most Recent):  Temp: 97.7 °F (36.5 °C) (07/23/18 0825)  Pulse: 102 (07/23/18 0825)  Resp: 18 (07/23/18 0825)  BP: 100/82 (07/23/18 0825)  SpO2: 99 % (07/23/18 0825) Vital Signs (24h Range):  Temp:  [97.6 °F (36.4 °C)-98 °F (36.7 °C)] 97.7 °F (36.5 °C)  Pulse:  [] 102  Resp:  [17-20] 18  SpO2:  [94 %-99 %] 99 %  BP: (100-122)/(74-91) 100/82     Patient Vitals for the past 72 hrs (Last 3 readings):   Weight   07/23/18 0445 86.2 kg (190 lb)   07/22/18 0400 85.7 kg (188 lb 15 oz)   07/21/18 0400 86.6 kg (190 lb 14.7 oz)     Body mass index is 25.07 kg/m².      Intake/Output Summary (Last 24 hours) at 07/23/18 0929  Last data filed at 07/23/18 0445   Gross per 24 hour   Intake              790 ml   Output             1200 ml   Net             -410 ml        Telemetry: ST    Physical Exam   Constitutional: He is oriented to person, place, and  time. He appears well-developed and well-nourished.   HENT:   Head: Normocephalic and atraumatic.   Eyes: Conjunctivae and EOM are normal. Pupils are equal, round, and reactive to light.   Neck: Normal range of motion. Neck supple.   Cardiovascular: Normal rate and regular rhythm.    + S4   Pulmonary/Chest: Effort normal and breath sounds normal.   Abdominal: Soft. Bowel sounds are normal.   Musculoskeletal: Normal range of motion.   Neurological: He is alert and oriented to person, place, and time.   Skin: Skin is warm and dry. Capillary refill takes less than 2 seconds.   Psychiatric: He has a normal mood and affect. His behavior is normal. Judgment and thought content normal.     Significant Labs:  CBC:    Recent Labs  Lab 07/21/18  0436 07/22/18  0602 07/23/18  0720   WBC 7.24 7.91 7.11   RBC 5.01 5.62 5.25   HGB 13.8* 15.0 14.4   HCT 43.3 48.9 45.0   * 144* 136*   MCV 86 87 86   MCH 27.5 26.7* 27.4   MCHC 31.9* 30.7* 32.0     BNP:    Recent Labs  Lab 07/17/18  1035   *     CMP:    Recent Labs  Lab 07/21/18  0436 07/22/18  0602 07/22/18  1311 07/23/18  0720    87  --  83   CALCIUM 8.7 9.2  --  8.9   ALBUMIN 2.6* 3.0*  --  2.7*   PROT 5.5* 6.0  --  5.3*    138  --  137   K 4.1 5.3* 5.0 5.0   CO2 32* 30*  --  34*   CL 96 97  --  97   BUN 32* 36*  --  30*   CREATININE 1.0 1.2  --  1.1   ALKPHOS 157* 185*  --  166*   ALT 27 50*  --  48*   AST 26 42*  --  31   BILITOT 0.5 0.9  --  1.1*      Coagulation:     Recent Labs  Lab 07/17/18  1308   INR 1.0     LDH:  No results for input(s): LDH in the last 72 hours.  Microbiology:  Microbiology Results (last 7 days)     ** No results found for the last 168 hours. **        I have reviewed all pertinent labs within the past 24 hours.    Estimated Creatinine Clearance: 112 mL/min (based on SCr of 1.1 mg/dL).    Diagnostic Results:  I have reviewed all pertinent imaging results/findings within the past 24 hours.    Assessment and Plan:     26 yo male,  new to our service, s/p OHTx in 2010 at [facility] presented to the ED with ~ 2 week h/o worsening CORTEZ and JUN that extends up to scrotum. He relates that he cut ties with the [facility] program ~ 2-3 years ago after undergoing coronary PCI's to his transplanted heart. Was doing well until April when he was hospitalized at [facility] for volume overload and was diuresed. Was seen by Dr. Holliday 7/6/18 and TTE on that day was 53%, RV mod enlarge, LA markedly dilated and mild to mod MR. TTE today shows decline in LVEF to 25-30%.    He admits that he takes his meds sporadically. Supposed to be on Prograf 1 mg bid. Also supposed to take Plavix for his PCI's, and says he takes Lasix 80 mg from time to time. Brought his pill bottles - all are out of date. He admits to smoking marijuana daily. 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 Normotensive.    * Heart transplant failure and rejection    - L/RHC done 7/17 showed significant CAD/CAV, no interventions done. RA: 30, PA: 62/40, W: 40, CO/CI 4.85/2.24. Unable to obtain EMBx via IJ approach - will need to do groin approach in the future for EMBx.  - Given 3 doses of Solu Medrol. continue Prednisone 20mg daily.  - DSA with CW2(11251),CW7(6540). Will try to obtain donor antibodies from [facility] or [facility].  - Continue Lasix 80mg po BID   - Continue Prograf (goal level 5-10). Will add low dose MMF as well.   - Was also on Rapamune with Prograf in the past, but stopped taking it when he couldn't get it for free.  - Continue Plavix, ASA and high intensity statin for h/o PCI's.  - Will repeat echo.         Noncompliance with medications    - previously non compliance and has positive Illicit drug use.  - report that he cannot work anymore and he is considering to get on disability.        Hyperglycemia    - 2/2 IV steroids  - SSI        Acute on chronic combined systolic and diastolic heart failure    - 2/2 allograft rejection.  - Continue diuresis as  above and anti rejection as current.  - net negative overnight.  - CAV Positive at mid-LAD with ISR.        Marijuana smoker    - Tox screen +ve for THC, amphetamines          Coy Hayes, NP  Heart Transplant  [facility]

## 2018-07-23 NOTE — PROGRESS NOTES
Mr. Jamar Smiley is being discharged today following admission for evaluation of acute rejection. His pMH is significant for OHT from [facility] 2010, CAV, HTN.     On admission his EF was reduced to 25-30% (per report was 53% on TTE from 2/18). He reported taking medications on and off. DSA were sent, but were unable to determine donors antibodies. C1q was positive for Cw2: 06541 and CW7: 4740. An EMB was unable to be obtained because of occluded access.  LHC demonstrated diffuse luminal irregularities. Follow up TTE is ordered for completion today.     Immunosuppression PTA consisted of FK 1mg twice daily (level on admission was 2.1). He was administered a solumedrol 1gm bolus x 3 doses, followed by prednisone 20mg orally daily. His clinical status remained stable and he was stepped down from the ICU.     Prednisone was decreased to 10mg orally daily on discharge.  FK was increased to 2mg orally twice daily for goal of 5-10. MMF was started at 500mg orally daily.      He reported being on rapamune at some point, but was unable to afford the cost.  This was not restarted, as it is unclear how long he has been off of it. MMF was initiated today with plan to increase dose to 1500mg as tolerated.     OI prophylaxis includes nystatin for 1 month and bactrim SS for 6 months. Follow up potassium level needed. Valcyte was not started as CMV IgG was positive, and affordability.     Mr. Smiley was provided a new medication administration card prior to discharge. His medications were ordered through the [facility]. Thank you.

## 2018-07-23 NOTE — ASSESSMENT & PLAN NOTE
- L/RHC done 7/17 showed significant CAD/CAV, no interventions done. RA: 30, PA: 62/40, W: 40, CO/CI 4.85/2.24. Unable to obtain EMBx via IJ approach - will need to do groin approach in the future for EMBx.  - Given 3 doses of Solu Medrol. continue Prednisone 20mg daily.  - DSA with CW2(51373),CW7(6714). Will try to obtain donor antibodies from [facility] or [facility].  - Continue Lasix 80mg po BID   - Continue Prograf (goal level 5-10). Will add low dose MMF as well.   - Was also on Rapamune with Prograf in the past, but stopped taking it when he couldn't get it for free.  - Continue Plavix, ASA and high intensity statin for h/o PCI's.  - Will repeat echo.

## 2018-07-23 NOTE — SUBJECTIVE & OBJECTIVE
Interval History: No complaints this am. Feels well.     Continuous Infusions:    Scheduled Meds:   aspirin  81 mg Oral Daily    atorvastatin  80 mg Oral Daily    clopidogrel  75 mg Oral Daily    furosemide  80 mg Oral BID    magnesium oxide  400 mg Oral BID    mycophenolate  500 mg Oral BID    nystatin  500,000 Units Oral QID (WM & HS)    predniSONE  20 mg Oral Daily    sulfamethoxazole-trimethoprim 400-80mg  1 tablet Oral Daily    tacrolimus  2 mg Oral BID     PRN Meds:acetaminophen, dextrose 50%, dextrose 50%, glucagon (human recombinant), glucose, glucose, insulin aspart U-100, traMADol    Review of patient's allergies indicates:   Allergen Reactions    Contrast media Shortness Of Breath, Itching and Rash     Objective:     Vital Signs (Most Recent):  Temp: 97.7 °F (36.5 °C) (07/23/18 0825)  Pulse: 102 (07/23/18 0825)  Resp: 18 (07/23/18 0825)  BP: 100/82 (07/23/18 0825)  SpO2: 99 % (07/23/18 0825) Vital Signs (24h Range):  Temp:  [97.6 °F (36.4 °C)-98 °F (36.7 °C)] 97.7 °F (36.5 °C)  Pulse:  [] 102  Resp:  [17-20] 18  SpO2:  [94 %-99 %] 99 %  BP: (100-122)/(74-91) 100/82     Patient Vitals for the past 72 hrs (Last 3 readings):   Weight   07/23/18 0445 86.2 kg (190 lb)   07/22/18 0400 85.7 kg (188 lb 15 oz)   07/21/18 0400 86.6 kg (190 lb 14.7 oz)     Body mass index is 25.07 kg/m².      Intake/Output Summary (Last 24 hours) at 07/23/18 0953  Last data filed at 07/23/18 0445   Gross per 24 hour   Intake              790 ml   Output             1200 ml   Net             -410 ml        Telemetry: ST    Physical Exam   Constitutional: He is oriented to person, place, and time. He appears well-developed and well-nourished.   HENT:   Head: Normocephalic and atraumatic.   Eyes: Conjunctivae and EOM are normal. Pupils are equal, round, and reactive to light.   Neck: Normal range of motion. Neck supple.   Cardiovascular: Normal rate and regular rhythm.    + S4   Pulmonary/Chest: Effort normal and  breath sounds normal.   Abdominal: Soft. Bowel sounds are normal.   Musculoskeletal: Normal range of motion.   Neurological: He is alert and oriented to person, place, and time.   Skin: Skin is warm and dry. Capillary refill takes less than 2 seconds.   Psychiatric: He has a normal mood and affect. His behavior is normal. Judgment and thought content normal.     Significant Labs:  CBC:    Recent Labs  Lab 07/21/18  0436 07/22/18  0602 07/23/18  0720   WBC 7.24 7.91 7.11   RBC 5.01 5.62 5.25   HGB 13.8* 15.0 14.4   HCT 43.3 48.9 45.0   * 144* 136*   MCV 86 87 86   MCH 27.5 26.7* 27.4   MCHC 31.9* 30.7* 32.0     BNP:    Recent Labs  Lab 07/17/18  1035   *     CMP:    Recent Labs  Lab 07/21/18  0436 07/22/18  0602 07/22/18  1311 07/23/18  0720    87  --  83   CALCIUM 8.7 9.2  --  8.9   ALBUMIN 2.6* 3.0*  --  2.7*   PROT 5.5* 6.0  --  5.3*    138  --  137   K 4.1 5.3* 5.0 5.0   CO2 32* 30*  --  34*   CL 96 97  --  97   BUN 32* 36*  --  30*   CREATININE 1.0 1.2  --  1.1   ALKPHOS 157* 185*  --  166*   ALT 27 50*  --  48*   AST 26 42*  --  31   BILITOT 0.5 0.9  --  1.1*      Coagulation:     Recent Labs  Lab 07/17/18  1308   INR 1.0     LDH:  No results for input(s): LDH in the last 72 hours.  Microbiology:  Microbiology Results (last 7 days)     ** No results found for the last 168 hours. **        I have reviewed all pertinent labs within the past 24 hours.    Estimated Creatinine Clearance: 112 mL/min (based on SCr of 1.1 mg/dL).    Diagnostic Results:  I have reviewed all pertinent imaging results/findings within the past 24 hours.

## 2018-07-24 ENCOUNTER — TELEPHONE (OUTPATIENT)
Dept: TRANSPLANT | Facility: CLINIC | Age: 28
End: 2018-07-24

## 2018-07-24 DIAGNOSIS — Z79.899 ENCOUNTER FOR LONG-TERM (CURRENT) USE OF MEDICATIONS: ICD-10-CM

## 2018-07-24 DIAGNOSIS — Z94.1 STATUS POST HEART TRANSPLANT: ICD-10-CM

## 2018-07-24 DIAGNOSIS — E78.2 MIXED HYPERLIPIDEMIA: ICD-10-CM

## 2018-07-24 NOTE — TELEPHONE ENCOUNTER
Attempted to contact pt this afternoon, cell phone has no voicemail set up, Pt and other contact number is the same and phone number not in service.

## 2018-07-24 NOTE — DISCHARGE SUMMARY
[facility]  Heart Transplant  Discharge Summary      Patient Name: Jamar Smiley  MRN: [id number]  Admission Date: 7/17/2018  Hospital Length of Stay: 6 days  Discharge Date and Time: 07/23/2018  Attending Physician: Janeen att. providers found   Discharging Provider: Coy Hayes NP  Primary Care Provider: Primary Doctor Janeen     HPI: 28 yo male, new to our service, s/p OHTx in 2010 at [facility] presented to the ED with ~ 2 week h/o worsening CORTEZ and JUN that extends up to scrotum. He relates that he cut ties with the [facility] program ~ 2-3 years ago after undergoing coronary PCI's to his transplanted heart. Was doing well until April when he was hospitalized at [facility] for volume overload and was diuresed. Was seen by Dr. Holliday 7/6/18. TTE in February showed LVEF 53%, RV mod enlarge, LA markedly dilated and mild to mod MR. TTE today shows decline in LVEF to 25-30%.  He admits that he takes his meds sporadically. Supposed to be on Prograf 1 mg bid. Also supposed to take Plavix for his PCI's, and says he takes Lasix 80 mg from time to time. Brought his pill bottles - all are out of date. He admits to smoking marijuana daily. 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 Normotensive.    Procedure(s) (LRB):  INSERTION, CATHETER, RIGHT HEART (N/A)  Coronary angiography (N/A)     Hospital Course: He was started on IV Lasix for diureses at admit. In addition, INV CARDS was consulted and LHC/RHC was performed which revealed cccluded right EIV, left CFV, and right IJV. 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 PCWP = 40 mmHg, Mean RA = 30 mmHg, and Suman CI = 2.24. Unable to get Bx. DSA was sent which revealed CW2(23579),CW7(9478). He was administered a solumedrol 1gm bolus x 3 doses, followed by prednisone 20mg orally daily. Prednisone was decreased to 10mg orally daily on discharge. FK was increased to 2mg orally twice daily for goal of 5-10. MMF was started at 500mg orally daily. He  reported being on rapamune at some point, but was unable to afford the cost. This was not restarted, as it is unclear how long he has been off of it. MMF was initiated with plan to increase dose to 1500mg as tolerated. OI prophylaxis includes nystatin for 1 month and bactrim SS for 6 months. Follow up potassium level needed. Valcyte was not started as CMV IgG was positive, and affordability. He will follow with [facility] in the future and will come back to clinic in 1-2 weeks with echo prior.     Consults         Status Ordering Provider     Inpatient consult to Cardiology  Once     Provider:  (Not yet assigned)    Completed WILFRED HELTON     Inpatient consult to Interventional Cardiology  Once     Provider:  (Not yet assigned)    Completed PIERRE CHURCH        Pending Diagnostic Studies:     Procedure Component Value Units Date/Time    Lipid panel [[id number]] Collected:  07/18/18 0750    Order Status:  Sent Lab Status:  In process Updated:  07/18/18 1050    Specimen:  Blood from Blood         Final Active Diagnoses:    Diagnosis Date Noted POA    PRINCIPAL PROBLEM:  Heart transplant failure and rejection [T86.22, T86.21] 07/17/2018 Yes    Acute on chronic combined systolic and diastolic heart failure [I50.43] 07/18/2018 Unknown    Hyperglycemia [R73.9] 07/18/2018 Unknown    Chronic rejection of heart transplant [T86.21] 07/18/2018 Yes    Atherosclerosis of native coronary artery of transplanted heart without angina pectoris [I25.811] 07/18/2018 Yes    Noncompliance with medications [Z91.14]  Not Applicable    History of heart transplant [Z94.1] 07/17/2018 Not Applicable    Marijuana smoker [F12.90] 07/17/2018 Unknown      Problems Resolved During this Admission:    Diagnosis Date Noted Date Resolved POA    SOB (shortness of breath) [R06.02] 07/17/2018 07/18/2018 Yes      Discharged Condition: good    Disposition: Home or Self Care    Patient Instructions:   No discharge procedures on  file.  Medications:  Reconciled Home Medications:      Medication List      START taking these medications    aspirin 81 MG EC tablet  Commonly known as:  ECOTRIN  Take 1 tablet (81 mg total) by mouth once daily.     atorvastatin 80 MG tablet  Commonly known as:  LIPITOR  Take 1 tablet (80 mg total) by mouth once daily.     famotidine 40 MG tablet  Commonly known as:  PEPCID  Take 1 tablet (40 mg total) by mouth every evening.     magnesium oxide 400 mg tablet  Commonly known as:  MAG-OX  Take 1 tablet (400 mg total) by mouth 2 (two) times daily.     mycophenolate 250 mg Cap  Commonly known as:  CELLCEPT  Take 2 capsules (500 mg total) by mouth 2 (two) times daily.     nystatin 100,000 unit/mL suspension  Commonly known as:  MYCOSTATIN  Take 5 mLs (500,000 Units total) by mouth 4 (four) times daily with meals and nightly.     predniSONE 10 MG tablet  Commonly known as:  DELTASONE  Take 1 tablet (10 mg total) by mouth once daily.     sulfamethoxazole-trimethoprim 400-80mg 400-80 mg per tablet  Commonly known as:  BACTRIM,SEPTRA  Take 1 tablet by mouth once daily.        CHANGE how you take these medications    furosemide 80 MG tablet  Commonly known as:  LASIX  Take 1 tablet (80 mg total) by mouth 2 (two) times daily.  What changed:  · medication strength  · how much to take     tacrolimus 1 MG Cap  Commonly known as:  PROGRAF  Take 2 capsules (2 mg total) by mouth every 12 (twelve) hours.  What changed:  how much to take        CONTINUE taking these medications    clopidogrel 75 mg tablet  Commonly known as:  PLAVIX  Take 1 tablet (75 mg total) by mouth once daily.        STOP taking these medications    lisinopril 5 MG tablet  Commonly known as:  PRINIVIL,ZESTRIL     spironolactone 25 MG tablet  Commonly known as:  ALDACTONE            Coy Hayes NP  Heart Transplant  [facility]

## 2018-07-31 NOTE — TELEPHONE ENCOUNTER
Spoke with pt and he stated that he had been in alf, but out now and can come to clinic.  Echo and labs scheduled for 8/21/18, but will see about f/u in [facility] clinic.

## 2018-08-21 ENCOUNTER — HOSPITAL ENCOUNTER (OUTPATIENT)
Dept: CARDIOLOGY | Facility: CLINIC | Age: 28
Discharge: HOME | End: 2018-08-21
Attending: INTERNAL MEDICINE
Payer: MEDICAID

## 2018-08-21 ENCOUNTER — OFFICE VISIT (OUTPATIENT)
Dept: TRANSPLANT | Facility: CLINIC | Age: 28
End: 2018-08-21
Payer: MEDICAID

## 2018-08-21 ENCOUNTER — LAB VISIT (OUTPATIENT)
Dept: LAB | Facility: HOSPITAL | Age: 28
End: 2018-08-21
Payer: MEDICAID

## 2018-08-21 ENCOUNTER — PATIENT MESSAGE (OUTPATIENT)
Dept: TRANSPLANT | Facility: CLINIC | Age: 28
End: 2018-08-21

## 2018-08-21 VITALS
DIASTOLIC BLOOD PRESSURE: 82 MMHG | SYSTOLIC BLOOD PRESSURE: 122 MMHG | BODY MASS INDEX: 25.45 KG/M2 | HEART RATE: 94 BPM | WEIGHT: 192 LBS | HEIGHT: 73 IN

## 2018-08-21 DIAGNOSIS — Z79.899 ENCOUNTER FOR LONG-TERM (CURRENT) USE OF MEDICATIONS: ICD-10-CM

## 2018-08-21 DIAGNOSIS — T86.21 HEART TRANSPLANT FAILURE AND REJECTION: ICD-10-CM

## 2018-08-21 DIAGNOSIS — Z91.148 NONCOMPLIANCE WITH MEDICATIONS: ICD-10-CM

## 2018-08-21 DIAGNOSIS — Z94.1 STATUS POST HEART TRANSPLANT: ICD-10-CM

## 2018-08-21 DIAGNOSIS — E78.2 MIXED HYPERLIPIDEMIA: ICD-10-CM

## 2018-08-21 DIAGNOSIS — Z94.1 HISTORY OF HEART TRANSPLANT: ICD-10-CM

## 2018-08-21 DIAGNOSIS — T86.22 HEART TRANSPLANT FAILURE AND REJECTION: ICD-10-CM

## 2018-08-21 DIAGNOSIS — I25.811 ATHEROSCLEROSIS OF NATIVE CORONARY ARTERY OF TRANSPLANTED HEART WITHOUT ANGINA PECTORIS: ICD-10-CM

## 2018-08-21 DIAGNOSIS — T86.21 CHRONIC REJECTION OF HEART TRANSPLANT: ICD-10-CM

## 2018-08-21 DIAGNOSIS — F12.90 MARIJUANA SMOKER: Primary | ICD-10-CM

## 2018-08-21 LAB
ALBUMIN SERPL BCP-MCNC: 3.9 G/DL
ALP SERPL-CCNC: 183 U/L
ALT SERPL W/O P-5'-P-CCNC: 32 U/L
ANION GAP SERPL CALC-SCNC: 11 MMOL/L
AST SERPL-CCNC: 26 U/L
BASOPHILS # BLD AUTO: 0.03 K/UL
BASOPHILS NFR BLD: 0.6 %
BILIRUB SERPL-MCNC: 1.5 MG/DL
BNP SERPL-MCNC: 424 PG/ML
BUN SERPL-MCNC: 19 MG/DL
CALCIUM SERPL-MCNC: 9.3 MG/DL
CHLORIDE SERPL-SCNC: 104 MMOL/L
CHOLEST SERPL-MCNC: 113 MG/DL
CHOLEST/HDLC SERPL: 2.2 {RATIO}
CO2 SERPL-SCNC: 27 MMOL/L
CREAT SERPL-MCNC: 1 MG/DL
DIFFERENTIAL METHOD: ABNORMAL
EOSINOPHIL # BLD AUTO: 0.1 K/UL
EOSINOPHIL NFR BLD: 1.2 %
ERYTHROCYTE [DISTWIDTH] IN BLOOD BY AUTOMATED COUNT: 16.9 %
EST. GFR  (AFRICAN AMERICAN): >60 ML/MIN/1.73 M^2
EST. GFR  (NON AFRICAN AMERICAN): >60 ML/MIN/1.73 M^2
ESTIMATED PA SYSTOLIC PRESSURE: 47.26
GLUCOSE SERPL-MCNC: 82 MG/DL
HCT VFR BLD AUTO: 45.4 %
HDLC SERPL-MCNC: 52 MG/DL
HDLC SERPL: 46 %
HGB BLD-MCNC: 14.4 G/DL
IMM GRANULOCYTES # BLD AUTO: 0.04 K/UL
IMM GRANULOCYTES NFR BLD AUTO: 0.8 %
LDLC SERPL CALC-MCNC: 44 MG/DL
LYMPHOCYTES # BLD AUTO: 0.5 K/UL
LYMPHOCYTES NFR BLD: 8.9 %
MAGNESIUM SERPL-MCNC: 1.6 MG/DL
MCH RBC QN AUTO: 27.4 PG
MCHC RBC AUTO-ENTMCNC: 31.7 G/DL
MCV RBC AUTO: 86 FL
MITRAL VALVE MOBILITY: NORMAL
MITRAL VALVE REGURGITATION: ABNORMAL
MONOCYTES # BLD AUTO: 0.6 K/UL
MONOCYTES NFR BLD: 11.5 %
NEUTROPHILS # BLD AUTO: 3.9 K/UL
NEUTROPHILS NFR BLD: 77 %
NONHDLC SERPL-MCNC: 61 MG/DL
NRBC BLD-RTO: 0 /100 WBC
PLATELET # BLD AUTO: 105 K/UL
PMV BLD AUTO: 10.6 FL
POTASSIUM SERPL-SCNC: 3.7 MMOL/L
PROT SERPL-MCNC: 6.4 G/DL
RBC # BLD AUTO: 5.26 M/UL
RETIRED EF AND QEF - SEE NOTES: 35 (ref 55–65)
SODIUM SERPL-SCNC: 142 MMOL/L
TACROLIMUS BLD-MCNC: 11.5 NG/ML
TRICUSPID VALVE REGURGITATION: ABNORMAL
TRIGL SERPL-MCNC: 85 MG/DL
WBC # BLD AUTO: 5.06 K/UL

## 2018-08-21 PROCEDURE — 36415 COLL VENOUS BLD VENIPUNCTURE: CPT

## 2018-08-21 PROCEDURE — 83735 ASSAY OF MAGNESIUM: CPT | Mod: NTX

## 2018-08-21 PROCEDURE — 80061 LIPID PANEL: CPT | Mod: NTX

## 2018-08-21 PROCEDURE — 86977 RBC SERUM PRETX INCUBJ/INHIB: CPT | Mod: 91,PO,NTX

## 2018-08-21 PROCEDURE — 86832 HLA CLASS I HIGH DEFIN QUAL: CPT | Mod: 91,PO,TXP

## 2018-08-21 PROCEDURE — 80197 ASSAY OF TACROLIMUS: CPT

## 2018-08-21 PROCEDURE — 86832 HLA CLASS I HIGH DEFIN QUAL: CPT | Mod: PO,NTX

## 2018-08-21 PROCEDURE — 83880 ASSAY OF NATRIURETIC PEPTIDE: CPT

## 2018-08-21 PROCEDURE — 93306 TTE W/DOPPLER COMPLETE: CPT | Mod: PBBFAC | Performed by: INTERNAL MEDICINE

## 2018-08-21 PROCEDURE — 80053 COMPREHEN METABOLIC PANEL: CPT

## 2018-08-21 PROCEDURE — 99215 OFFICE O/P EST HI 40 MIN: CPT | Mod: S$PBB,,, | Performed by: INTERNAL MEDICINE

## 2018-08-21 PROCEDURE — 99999 PR PBB SHADOW E&M-EST. PATIENT-LVL III: CPT | Mod: PBBFAC,,, | Performed by: INTERNAL MEDICINE

## 2018-08-21 PROCEDURE — 85025 COMPLETE CBC W/AUTO DIFF WBC: CPT

## 2018-08-21 PROCEDURE — 99213 OFFICE O/P EST LOW 20 MIN: CPT | Mod: PBBFAC | Performed by: INTERNAL MEDICINE

## 2018-08-21 PROCEDURE — 86833 HLA CLASS II HIGH DEFIN QUAL: CPT | Mod: PO,TXP

## 2018-08-21 NOTE — PROGRESS NOTES
Subjective:   Mr. Smiley is a 27 y.o. year old white male who received a  heart transplant on 3/23/10.      CMV status:   Donor:   Recipient: +    HPI    28 yo male, new to our service, s/p OHTx in 2010 at [facility] presented to the ED with ~ 2 week h/o worsening CORTEZ and JUN that extends up to scrotum. He relates that he cut ties with the [facility] program ~ 2-3 years ago after undergoing coronary PCI's to his transplanted heart. Was doing well until April when he was hospitalized at [facility] for volume overload and was diuresed. Was seen by Dr. Holliday 7/6/18. TTE in February showed LVEF 53%, RV mod enlarge, LA markedly dilated and mild to mod  TTE showed a decline in LVEF to 25-30% and was admitted here for management. He admited that he takes his meds sporadically. Supposed to be on Prograf 1 mg bid. Also supposed to take Plavix for his PCI's, and says he takes Lasix 80 mg from time to time.    Today he feels much better. Prograf 2/2  mg bid and prednisone 10 mg daily       LVEDP=40mmHg.    CAV as detailed below including 90% mid LAD ISR.    Occluded right EIV.    Occluded left CFV.    Occluded right IJV.    RHC performed via left IJV.    PCWP=40mmHg.    PA=62/40(5)mmHg.    RVSP=62mmHg.    Mean RA=30mmHg.    Suman CO=4.85.    Suman CI=2.24.     Angiographic Results     Diagnostic:          Patient has a right dominant coronary artery.        - Left Main Coronary Artery:             The LM has a 10% stenosis. There is DENIZ 3 flow. The remaining portion of the vessel has luminal irregularities (10).     - Left Anterior Descending Artery:             The proximal LAD has luminal irregularities. There is DENIZ 3 flow.             The mid LAD has a 90% stenosis. There is DENIZ 3 flow. The remaining portion of the vessel has luminal irregularities (10). The 90% mid LAD stenosis is ISR.             The distal LAD has chronic total occlusion. There is DENIZ 0 flow.     - D1:             The D1 has luminal  irregularities. There is DENIZ 3 flow.     - D2:             The D2 has luminal irregularities. There is DENIZ 3 flow.     - Left Circumflex Artery:             The proximal LCX has luminal irregularities. There is DENIZ 3 flow.             The distal LCX has luminal irregularities. There is DENIZ 3 flow. The remaining portion of the vessel is of small caliber.     - OM1:             The OM1 has luminal irregularities. There is DENIZ 3 flow. The remaining portion of the vessel is of small caliber.     - OM2:             The OM2 has a 99% stenosis. There is DENIZ 3 flow. The remaining portion of the vessel is of small caliber.     - OM3:             The OM3 has a 60% stenosis. There is DENIZ 3 flow. The remaining portion of the vessel has luminal irregularities (10).     - Right Coronary Artery:             The proximal RCA has a 10% stenosis. There is DENIZ 3 flow. The remaining portion of the vessel has luminal irregularities (10).             The mid RCA has a 30% stenosis. There is DENIZ 3 flow. The remaining portion of the vessel has luminal irregularities (10).             The distal RCA is normal. There is DENIZ 3 flow.     - Posterior Lateral Branch:             The proximal PLB has luminal irregularities. There is DENIZ 3 flow.             The distal PLB is diffusely diseased (75). There is DENIZ 3 flow.     - Posterior Descending Artery:             The PDA has luminal irregularities. There is DENIZ 3 flow.        Hospital Course: He was started on IV Lasix for diureses at admit. In addition, INV CARDS was consulted and LHC/RHC was performed which revealed cccluded right EIV, left CFV, and right IJV. 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 PCWP = 40 mmHg, Mean RA = 30 mmHg, and Suman CI = 2.24. Unable to get Bx. DSA was sent which revealed CW2(29308),CW7(1159). He was administered a solumedrol 1gm bolus x 3 doses, followed by prednisone 20mg orally daily. Prednisone was decreased to 10mg orally daily on discharge. FK was increased  "to 2mg orally twice daily for goal of 5-10. MMF was started at 500mg orally daily. He reported being on rapamune at some point, but was unable to afford the cost. This was not restarted, as it is unclear how long he has been off of it. MMF was initiated with plan to increase dose to 1500mg as tolerated. OI prophylaxis includes nystatin for 1 month and bactrim SS for 6 months. Follow up potassium level needed. Valcyte was not started as CMV IgG was positive, and affordability. He will follow with [facility] in the future and will come back to clinic in 1-2 weeks with echo prior      Review of Systems   Constitution: Negative for chills, decreased appetite, diaphoresis, fever, weakness, malaise/fatigue, night sweats, weight gain and weight loss.   Cardiovascular: Negative for chest pain, claudication, cyanosis, dyspnea on exertion, irregular heartbeat, leg swelling, near-syncope, orthopnea and palpitations.   Respiratory: Negative for cough, hemoptysis, shortness of breath, sleep disturbances due to breathing, snoring, sputum production and wheezing.    Gastrointestinal: Negative for abdominal pain and diarrhea.       Objective:   Blood pressure 122/82, pulse 94, height 6' 1" (1.854 m), weight 87.1 kg (192 lb 0.3 oz).body mass index is 25.33 kg/m².    Physical Exam   Constitutional: He is oriented to person, place, and time. He appears well-developed and well-nourished.   HENT:   Head: Normocephalic and atraumatic.   Eyes: Conjunctivae and EOM are normal. Pupils are equal, round, and reactive to light.   Neck: Normal range of motion. Neck supple. No JVD present.   Cardiovascular: Normal rate and regular rhythm. Exam reveals no gallop and no friction rub.   No murmur heard.  Pulmonary/Chest: Breath sounds normal. No respiratory distress. He has no wheezes. He has no rales. He exhibits no tenderness.   Abdominal: Soft. Bowel sounds are normal. He exhibits no distension and no mass. There is no hepatosplenomegaly, " splenomegaly or hepatomegaly. There is no tenderness. There is no rebound, no guarding and no CVA tenderness.   No hepatoslenomegaly   Musculoskeletal: Normal range of motion. He exhibits no edema or tenderness.   Neurological: He is alert and oriented to person, place, and time. He has normal reflexes. No cranial nerve deficit. He exhibits normal muscle tone. Coordination normal.   Skin: Skin is warm and dry.   Psychiatric: He has a normal mood and affect.       Lab Results   Component Value Date    WBC 7.11 07/23/2018    HGB 14.4 07/23/2018    HCT 45.0 07/23/2018    MCV 86 07/23/2018     (L) 07/23/2018    CO2 34 (H) 07/23/2018    CREATININE 1.1 07/23/2018    CALCIUM 8.9 07/23/2018    ALBUMIN 2.7 (L) 07/23/2018    AST 31 07/23/2018     (H) 07/17/2018    ALT 48 (H) 07/23/2018       Lab Results   Component Value Date    INR 1.0 07/17/2018       BNP   Date Value Ref Range Status   07/17/2018 504 (H) 0 - 99 pg/mL Final     Comment:     Values of less than 100 pg/ml are consistent with non-CHF populations.       No results found for: LDH    Tacrolimus Lvl   Date Value Ref Range Status   07/23/2018 7.2 5.0 - 15.0 ng/mL Final     Comment:     Testing performed by Liquid Chromatography-Tandem  Mass Spectrometry (LC-MS/MS).  This test was developed and its performance characteristics  determined by [facility], Department of Pathology  and Laboratory Medicine in a manner consistent with CLIA  requirements. It has not been cleared or approved by the [place]  Food and Drug Administration.  This test is used for clinical   purposes.  It should not be regarded as investigational or for  research.       No results found for: SIROLIMUS  No results found for: CYCLOSPORINE    No results found for this or any previous visit.  No results found for this or any previous visit.    Labs were reviewed with the patient.    Assessment:     1. Marijuana smoker    2. Noncompliance with medications    3. Atherosclerosis of  native coronary artery of transplanted heart without angina pectoris    4. Heart transplant failure and rejection    5. Chronic rejection of heart transplant    6. History of heart transplant        Plan:   Increase MMF to 1000 mg bid    Await CBC and 2 D echo    Return instructions as set forth by post transplant schedule or as needed:    Clinic: Return for labs and/or biopsy weekly the first month, every two weeks during month 2 and then monthly for the first year at the provider or coordinator's discretion. During the second year, return to clinic every 3 months. Post transplant year 3-5 return every 6 months. There will be a comprehensive post transplant evaluation every year that may include LHC/RHC/biopsy, stress test, echo, CXR, and other health screening exams.    In addition to the clinical assessment, I have ordered Allomap testing for this patient to assist in identification of moderate/severe acute cellular rejection (ACR) in a pt with stable Allograft function instead of endomyocardial biopsy.     Patient is reminded to call with any health changes as these can be early signs of transplant complications. Patient is advised to make sure any new medications or changes of old medications are discussed with a pharmacist or physician knowledgeable with transplant to avoid rejection/drug toxicity related to significant drug interactions.    UNOS Patient Status  Functional Status: 90% - Able to carry on normal activity: minor symptoms of disease  Physical Capacity: Limited Mobility  Working for Income: No  If no, reason not working: Disability    Romeo Rodríguez MD

## 2018-08-22 PROCEDURE — 86977 RBC SERUM PRETX INCUBJ/INHIB: CPT | Mod: 91,PO,NTX

## 2018-08-22 PROCEDURE — 86833 HLA CLASS II HIGH DEFIN QUAL: CPT | Mod: PO,NTX

## 2018-08-23 LAB
C1Q1 TESTING DATE: NORMAL
C1Q1 TESTING DATE: NORMAL
C1Q2 TESTING DATE: NORMAL
CLASS I ANTIBODY COMMENTS - LUMINEX: NORMAL
CLASS I ANTIBODY COMMENTS - LUMINEX: NORMAL
CLASS II ANTIBODY COMMENTS - LUMINEX: NORMAL
CLASS II ANTIBODY COMMENTS - LUMINEX: NORMAL
DSA1 TESTING DATE: NORMAL
DSA12 TESTING DATE: NORMAL
DSA2 TESTING DATE: NORMAL
SERUM COLLECTION DT - LUMINEX CLASS I: NORMAL
SERUM COLLECTION DT - LUMINEX CLASS I: NORMAL
SERUM COLLECTION DT - LUMINEX CLASS II: NORMAL
SERUM COLLECTION DT - LUMINEX CLASS II: NORMAL

## 2018-09-14 ENCOUNTER — TELEPHONE (OUTPATIENT)
Dept: TRANSPLANT | Facility: CLINIC | Age: 28
End: 2018-09-14

## 2018-09-14 NOTE — LETTER
[facility] [address] [place] [address]  Phone: [phone]   September 14, 2018    Jamar Smiley  [address] [place] [id number]          Dear Jamar Smiley,  MRN: [id number]    I have tried to reach by telephone, no voicemail is set up.  I am including a lab slip. You need to repeat labs to be able to reduce   Your medication. You will also need to plan on return to clinic for repeat echocardiogram and office visit.   I hope you are doing well, please call me at [phone].    If you have any questions or concerns, please don't hesitate to call.    Sincerely,      Jelly Fox R.N., B.S.N.  Post Heart Transplant Coordinator  [address] [place] [phone]

## 2018-09-28 ENCOUNTER — PATIENT MESSAGE (OUTPATIENT)
Dept: TRANSPLANT | Facility: CLINIC | Age: 28
End: 2018-09-28

## 2018-09-28 ENCOUNTER — TELEPHONE (OUTPATIENT)
Dept: TRANSPLANT | Facility: CLINIC | Age: 28
End: 2018-09-28

## 2018-09-28 NOTE — TELEPHONE ENCOUNTER
Attempted to call pt back, message left by his mother from an unknown number, she asked for refills on the patients medications to be sent into [facility] at [phone].    Preferred pharmacy updated in computer and tried to reach pt to see what medications are needed. Unable to reach pt/family with numbers listed, unable to leave a message for pt on his mobile number no voicemail is available.    Spoke with [facility] and reviewed that he did get medications transferred from another Pharmacy and picked up 9 medications on 9/25/18.

## 2018-10-03 NOTE — TELEPHONE ENCOUNTER
Pt did not respond yo message sent on 9/28/18, I spoke to pharmacy and the pt did  all of his medications.

## 2018-11-05 ENCOUNTER — HOSPITAL ENCOUNTER (OUTPATIENT)
Dept: RADIOLOGY | Facility: HOSPITAL | Age: 28
Discharge: HOME | End: 2018-11-05
Attending: INTERNAL MEDICINE
Payer: MEDICAID

## 2018-11-05 ENCOUNTER — OFFICE VISIT (OUTPATIENT)
Dept: TRANSPLANT | Facility: CLINIC | Age: 28
End: 2018-11-05
Payer: MEDICAID

## 2018-11-05 ENCOUNTER — HOSPITAL ENCOUNTER (OUTPATIENT)
Dept: CARDIOLOGY | Facility: CLINIC | Age: 28
Discharge: HOME | End: 2018-11-05
Attending: INTERNAL MEDICINE
Payer: MEDICAID

## 2018-11-05 VITALS
HEART RATE: 96 BPM | DIASTOLIC BLOOD PRESSURE: 66 MMHG | BODY MASS INDEX: 27.47 KG/M2 | HEIGHT: 73 IN | SYSTOLIC BLOOD PRESSURE: 123 MMHG | WEIGHT: 207.25 LBS

## 2018-11-05 VITALS
SYSTOLIC BLOOD PRESSURE: 130 MMHG | WEIGHT: 207 LBS | BODY MASS INDEX: 25.74 KG/M2 | HEIGHT: 75 IN | DIASTOLIC BLOOD PRESSURE: 90 MMHG

## 2018-11-05 DIAGNOSIS — T86.22 HEART TRANSPLANT FAILURE AND REJECTION: Primary | ICD-10-CM

## 2018-11-05 DIAGNOSIS — Z91.148 NONCOMPLIANCE WITH MEDICATIONS: ICD-10-CM

## 2018-11-05 DIAGNOSIS — Z94.1 HISTORY OF HEART TRANSPLANT: ICD-10-CM

## 2018-11-05 DIAGNOSIS — T86.21 CHRONIC REJECTION OF HEART TRANSPLANT: ICD-10-CM

## 2018-11-05 DIAGNOSIS — R73.9 HYPERGLYCEMIA: ICD-10-CM

## 2018-11-05 DIAGNOSIS — R05.9 COUGH: ICD-10-CM

## 2018-11-05 DIAGNOSIS — F12.90 MARIJUANA SMOKER: ICD-10-CM

## 2018-11-05 DIAGNOSIS — E78.2 MIXED HYPERLIPIDEMIA: ICD-10-CM

## 2018-11-05 DIAGNOSIS — T86.21 HEART TRANSPLANT FAILURE AND REJECTION: Primary | ICD-10-CM

## 2018-11-05 DIAGNOSIS — Z94.1 STATUS POST HEART TRANSPLANT: ICD-10-CM

## 2018-11-05 DIAGNOSIS — Z79.899 ENCOUNTER FOR LONG-TERM (CURRENT) USE OF MEDICATIONS: ICD-10-CM

## 2018-11-05 LAB
AV MEAN GRADIENT: 1.93 MMHG
AV PEAK GRADIENT: 4.08 MMHG
AV VALVE AREA: 3.33 CM2
BSA FOR ECHO PROCEDURE: 2.23 M2
CV ECHO LV RWT: 0.35 CM
DOP CALC AO PEAK VEL: 1.01 M/S
DOP CALC AO VTI: 15.67 CM
DOP CALC LVOT AREA: 4.3 CM2
DOP CALC LVOT DIAMETER: 2.34 CM
DOP CALC LVOT STROKE VOLUME: 52.22 CM3
DOP CALCLVOT PEAK VEL VTI: 12.15 CM
E WAVE DECELERATION TIME: 115.93 MSEC
E/A RATIO: 1.52
E/E' RATIO: 7
ECHO LV POSTERIOR WALL: 0.77 CM (ref 0.6–1.1)
FRACTIONAL SHORTENING: 14 % (ref 28–44)
INTERVENTRICULAR SEPTUM: 0.71 CM (ref 0.6–1.1)
LEFT INTERNAL DIMENSION IN SYSTOLE: 3.79 CM (ref 2.1–4)
LEFT VENTRICLE DIASTOLIC VOLUME INDEX: 39.93 ML/M2
LEFT VENTRICLE DIASTOLIC VOLUME: 89.04 ML
LEFT VENTRICLE MASS INDEX: 44.8 G/M2
LEFT VENTRICLE SYSTOLIC VOLUME INDEX: 27.5 ML/M2
LEFT VENTRICLE SYSTOLIC VOLUME: 61.41 ML
LEFT VENTRICULAR INTERNAL DIMENSION IN DIASTOLE: 4.43 CM (ref 3.5–6)
LEFT VENTRICULAR MASS: 100.01 G
LV LATERAL E/E' RATIO: 8.27
LV SEPTAL E/E' RATIO: 6.07
MV PEAK A VEL: 0.6 M/S
MV PEAK E VEL: 0.91 M/S
MV STENOSIS PRESSURE HALF TIME: 33.62 MS
MV VALVE AREA P 1/2 METHOD: 6.54 CM2
PISA TR MAX VEL: 2.56 M/S
RA PRESSURE: 15 MMHG
RETIRED EF AND QEF - SEE NOTES: 31.03 %
RIGHT VENTRICULAR END-DIASTOLIC DIMENSION: 4.1 CM
SINUS: 3.12 CM
TDI LATERAL: 0.11
TDI SEPTAL: 0.15
TDI: 0.13
TR MAX PG: 26.21 MMHG
TRICUSPID ANNULAR PLANE SYSTOLIC EXCURSION: 0.65 CM
TV REST PULMONARY ARTERY PRESSURE: 41.21 MMHG

## 2018-11-05 PROCEDURE — 99215 OFFICE O/P EST HI 40 MIN: CPT | Mod: S$PBB,,, | Performed by: INTERNAL MEDICINE

## 2018-11-05 PROCEDURE — 71046 X-RAY EXAM CHEST 2 VIEWS: CPT | Mod: TC,FY

## 2018-11-05 PROCEDURE — 99213 OFFICE O/P EST LOW 20 MIN: CPT | Mod: PBBFAC,25 | Performed by: INTERNAL MEDICINE

## 2018-11-05 PROCEDURE — 99999 PR PBB SHADOW E&M-EST. PATIENT-LVL III: CPT | Mod: PBBFAC,,, | Performed by: INTERNAL MEDICINE

## 2018-11-05 PROCEDURE — 93306 TTE W/DOPPLER COMPLETE: CPT | Mod: PBBFAC | Performed by: INTERNAL MEDICINE

## 2018-11-05 PROCEDURE — 71046 X-RAY EXAM CHEST 2 VIEWS: CPT | Mod: 26,,, | Performed by: RADIOLOGY

## 2018-11-05 RX ORDER — ZOLPIDEM TARTRATE 5 MG/1
5 TABLET ORAL NIGHTLY PRN
Qty: 30 TABLET | Refills: 1 | Status: SHIPPED | OUTPATIENT
Start: 2018-11-05 | End: 2020-01-01 | Stop reason: SDUPTHER

## 2018-11-05 NOTE — H&P (VIEW-ONLY)
Subjective:   Mr. Smiley is a 28 y.o. year old white male who received a  heart transplant on 3/23/10.      CMV status:   Donor:   Recipient: +    HPI    29 yo male, new to our service, s/p OHTx in 2010 at [facility] presented to the ED with ~ 2 week h/o worsening CORTEZ and JUN that extends up to scrotum. He relates that he cut ties with the [facility] program ~ 2-3 years ago after undergoing coronary PCI's to his transplanted heart. Was doing well until April when he was hospitalized at [facility] for volume overload and was diuresed. Was seen by Dr. Holliday 7/6/18. TTE in February showed LVEF 53%, RV mod enlarge, LA markedly dilated and mild to mod MRFarzad TTE showed a decline in LVEF to 25-30% and was admitted here for management. He admited that he takes his meds sporadically. Supposed to be on Prograf 1 mg bid. Also supposed to take Plavix for his PCI's, and says he takes Lasix 80 mg from time to time.     Diarrhea and coughing. Trouble sleeping    Today he feels much better. Prograf 2/2  mg bid and prednisone 10 mg daily      CONCLUSIONS     1 - Moderately depressed left ventricular systolic function (EF 35-40%).     2 - Post-cardiac transplantation study.     3 - Mildly depressed right ventricular systolic function .     4 - Pulmonary hypertension. The estimated PA systolic pressure is 47 mmHg.     5 - Mitral annulus inversus, raises concern for constriction, shortned deceleration time, however no respiratory variation obtained..  LVEDP=40mmHg.          RHC performed via left IJV.    PCWP=40mmHg.    PA=62/40(5)mmHg.    RVSP=62mmHg.    Mean RA=30mmHg.    Suman CO=4.85.    Suman CI=2.24.     Angiographic Results     Diagnostic:          Patient has a right dominant coronary artery.        - Left Main Coronary Artery:             The LM has a 10% stenosis. There is DENIZ 3 flow. The remaining portion of the vessel has luminal irregularities (10).     - Left Anterior Descending Artery:             The proximal LAD has  luminal irregularities. There is DENIZ 3 flow.             The mid LAD has a 90% stenosis. There is DENIZ 3 flow. The remaining portion of the vessel has luminal irregularities (10). The 90% mid LAD stenosis is ISR.             The distal LAD has chronic total occlusion. There is DENIZ 0 flow.     - D1:             The D1 has luminal irregularities. There is DENIZ 3 flow.     - D2:             The D2 has luminal irregularities. There is DENIZ 3 flow.     - Left Circumflex Artery:             The proximal LCX has luminal irregularities. There is DENIZ 3 flow.             The distal LCX has luminal irregularities. There is DENIZ 3 flow. The remaining portion of the vessel is of small caliber.     - OM1:             The OM1 has luminal irregularities. There is DENIZ 3 flow. The remaining portion of the vessel is of small caliber.     - OM2:             The OM2 has a 99% stenosis. There is DENIZ 3 flow. The remaining portion of the vessel is of small caliber.     - OM3:             The OM3 has a 60% stenosis. There is DENIZ 3 flow. The remaining portion of the vessel has luminal irregularities (10).     - Right Coronary Artery:             The proximal RCA has a 10% stenosis. There is DENIZ 3 flow. The remaining portion of the vessel has luminal irregularities (10).             The mid RCA has a 30% stenosis. There is DENIZ 3 flow. The remaining portion of the vessel has luminal irregularities (10).             The distal RCA is normal. There is DENIZ 3 flow.     - Posterior Lateral Branch:             The proximal PLB has luminal irregularities. There is DENIZ 3 flow.             The distal PLB is diffusely diseased (75). There is DENIZ 3 flow.     - Posterior Descending Artery:             The PDA has luminal irregularities. There is DENIZ 3 flow.    CAV as detailed below including 90% mid LAD ISR.    Occluded right EIV.    Occluded left CFV.    Occluded right IJV.        Hospital Course: He was started on IV Lasix for diureses at  "admit. In addition, INV CARDS was consulted and LHC/RHC was performed which revealed cccluded right EIV, left CFV, and right IJV. 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 PCWP = 40 mmHg, Mean RA = 30 mmHg, and Suman CI = 2.24. Unable to get Bx. DSA was sent which revealed CW2(07642),CW7(3453). He was administered a solumedrol 1gm bolus x 3 doses, followed by prednisone 20mg orally daily. Prednisone was decreased to 10mg orally daily on discharge. FK was increased to 2mg orally twice daily for goal of 5-10. MMF was started at 500mg orally daily. He reported being on rapamune at some point, but was unable to afford the cost. This was not restarted, as it is unclear how long he has been off of it. MMF was initiated with plan to increase dose to 1500mg as tolerated. OI prophylaxis includes nystatin for 1 month and bactrim SS for 6 months. Follow up potassium level needed. Valcyte was not started as CMV IgG was positive, and affordability. He will follow with [facility] in the future and will come back to clinic in 1-2 weeks with echo prior      Review of Systems   Constitution: Negative for chills, decreased appetite, diaphoresis, fever, weakness, malaise/fatigue, night sweats, weight gain and weight loss.   Cardiovascular: Negative for chest pain, claudication, cyanosis, dyspnea on exertion, irregular heartbeat, leg swelling, near-syncope, orthopnea and palpitations.   Respiratory: Negative for cough, hemoptysis, shortness of breath, sleep disturbances due to breathing, snoring, sputum production and wheezing.    Gastrointestinal: Negative for abdominal pain and diarrhea.       Objective:   Blood pressure 123/66, pulse 96, height 6' 1" (1.854 m), weight 94 kg (207 lb 3.7 oz).body mass index is 27.34 kg/m².    Physical Exam   Constitutional: He is oriented to person, place, and time. He appears well-developed and well-nourished.   HENT:   Head: Normocephalic and atraumatic.   Eyes: Conjunctivae and EOM are normal. Pupils are equal, " round, and reactive to light.   Neck: Normal range of motion. Neck supple. No JVD present.   Cardiovascular: Normal rate and regular rhythm. Exam reveals no gallop and no friction rub.   No murmur heard.  Pulmonary/Chest: Breath sounds normal. No respiratory distress. He has no wheezes. He has no rales. He exhibits no tenderness.   Abdominal: Soft. Bowel sounds are normal. He exhibits no distension and no mass. There is no hepatosplenomegaly, splenomegaly or hepatomegaly. There is no tenderness. There is no rebound, no guarding and no CVA tenderness.   No hepatoslenomegaly   Musculoskeletal: Normal range of motion. He exhibits no edema or tenderness.   Neurological: He is alert and oriented to person, place, and time. He has normal reflexes. No cranial nerve deficit. He exhibits normal muscle tone. Coordination normal.   Skin: Skin is warm and dry.   Psychiatric: He has a normal mood and affect.       Lab Results   Component Value Date    WBC 5.06 08/21/2018    HGB 14.4 08/21/2018    HCT 45.4 08/21/2018    MCV 86 08/21/2018     (L) 08/21/2018    CO2 27 11/05/2018    CREATININE 1.1 11/05/2018    CALCIUM 8.7 11/05/2018    ALBUMIN 3.0 (L) 11/05/2018    AST 30 11/05/2018     (H) 11/05/2018    ALT 23 11/05/2018       Lab Results   Component Value Date    INR 1.0 07/17/2018       BNP   Date Value Ref Range Status   11/05/2018 655 (H) 0 - 99 pg/mL Final     Comment:     Values of less than 100 pg/ml are consistent with non-CHF populations.   08/21/2018 424 (H) 0 - 99 pg/mL Final     Comment:     Values of less than 100 pg/ml are consistent with non-CHF populations.   07/17/2018 504 (H) 0 - 99 pg/mL Final     Comment:     Values of less than 100 pg/ml are consistent with non-CHF populations.       No results found for: LDH    Tacrolimus Lvl   Date Value Ref Range Status   08/21/2018 11.5 5.0 - 15.0 ng/mL Final     Comment:     Testing performed by Liquid Chromatography-Tandem  Mass Spectrometry  (LC-MS/MS).  This test was developed and its performance characteristics  determined by [facility], Department of Pathology  and Laboratory Medicine in a manner consistent with CLIA  requirements. It has not been cleared or approved by the [place]  Food and Drug Administration.  This test is used for clinical   purposes.  It should not be regarded as investigational or for  research.       No results found for: SIROLIMUS  No results found for: CYCLOSPORINE    No results found for this or any previous visit.  No results found for this or any previous visit.    Labs were reviewed with the patient.    Assessment:     1. Heart transplant failure and rejection    2. History of heart transplant    3. Chronic rejection of heart transplant    4. Hyperglycemia    5. Marijuana smoker    6. Noncompliance with medications        Plan:   Ambien 5 mg qhs    Await final echo report and CBC    Chest X ray    Return instructions as set forth by post transplant schedule or as needed:    Clinic: Return for labs and/or biopsy weekly the first month, every two weeks during month 2 and then monthly for the first year at the provider or coordinator's discretion. During the second year, return to clinic every 3 months. Post transplant year 3-5 return every 6 months. There will be a comprehensive post transplant evaluation every year that may include LHC/RHC/biopsy, stress test, echo, CXR, and other health screening exams.    In addition to the clinical assessment, I have ordered Allomap testing for this patient to assist in identification of moderate/severe acute cellular rejection (ACR) in a pt with stable Allograft function instead of endomyocardial biopsy.     Patient is reminded to call with any health changes as these can be early signs of transplant complications. Patient is advised to make sure any new medications or changes of old medications are discussed with a pharmacist or physician knowledgeable with transplant to avoid  rejection/drug toxicity related to significant drug interactions.    UNOS Patient Status  Functional Status: 90% - Able to carry on normal activity: minor symptoms of disease  Physical Capacity: Limited Mobility  Working for Income: No  If no, reason not working: Disability    Romeo Rodríguez MD

## 2018-11-05 NOTE — PROGRESS NOTES
Subjective:   Mr. Smiley is a 28 y.o. year old white male who received a  heart transplant on 3/23/10.      CMV status:   Donor:   Recipient: +    HPI    27 yo male, new to our service, s/p OHTx in 2010 at [facility] presented to the ED with ~ 2 week h/o worsening CORTEZ and JUN that extends up to scrotum. He relates that he cut ties with the [facility] program ~ 2-3 years ago after undergoing coronary PCI's to his transplanted heart. Was doing well until April when he was hospitalized at [facility] for volume overload and was diuresed. Was seen by Dr. Holliday 7/6/18. TTE in February showed LVEF 53%, RV mod enlarge, LA markedly dilated and mild to mod MRFarzad TTE showed a decline in LVEF to 25-30% and was admitted here for management. He admited that he takes his meds sporadically. Supposed to be on Prograf 1 mg bid. Also supposed to take Plavix for his PCI's, and says he takes Lasix 80 mg from time to time.     Diarrhea and coughing. Trouble sleeping    Today he feels much better. Prograf 2/2  mg bid and prednisone 10 mg daily      CONCLUSIONS     1 - Moderately depressed left ventricular systolic function (EF 35-40%).     2 - Post-cardiac transplantation study.     3 - Mildly depressed right ventricular systolic function .     4 - Pulmonary hypertension. The estimated PA systolic pressure is 47 mmHg.     5 - Mitral annulus inversus, raises concern for constriction, shortned deceleration time, however no respiratory variation obtained..  LVEDP=40mmHg.          RHC performed via left IJV.    PCWP=40mmHg.    PA=62/40(5)mmHg.    RVSP=62mmHg.    Mean RA=30mmHg.    Suman CO=4.85.    Suman CI=2.24.     Angiographic Results     Diagnostic:          Patient has a right dominant coronary artery.        - Left Main Coronary Artery:             The LM has a 10% stenosis. There is DENIZ 3 flow. The remaining portion of the vessel has luminal irregularities (10).     - Left Anterior Descending Artery:             The proximal LAD has  luminal irregularities. There is DENIZ 3 flow.             The mid LAD has a 90% stenosis. There is DENIZ 3 flow. The remaining portion of the vessel has luminal irregularities (10). The 90% mid LAD stenosis is ISR.             The distal LAD has chronic total occlusion. There is DENIZ 0 flow.     - D1:             The D1 has luminal irregularities. There is DENIZ 3 flow.     - D2:             The D2 has luminal irregularities. There is DENIZ 3 flow.     - Left Circumflex Artery:             The proximal LCX has luminal irregularities. There is DENIZ 3 flow.             The distal LCX has luminal irregularities. There is DENIZ 3 flow. The remaining portion of the vessel is of small caliber.     - OM1:             The OM1 has luminal irregularities. There is DENIZ 3 flow. The remaining portion of the vessel is of small caliber.     - OM2:             The OM2 has a 99% stenosis. There is DENIZ 3 flow. The remaining portion of the vessel is of small caliber.     - OM3:             The OM3 has a 60% stenosis. There is DENIZ 3 flow. The remaining portion of the vessel has luminal irregularities (10).     - Right Coronary Artery:             The proximal RCA has a 10% stenosis. There is DENIZ 3 flow. The remaining portion of the vessel has luminal irregularities (10).             The mid RCA has a 30% stenosis. There is DENIZ 3 flow. The remaining portion of the vessel has luminal irregularities (10).             The distal RCA is normal. There is DENIZ 3 flow.     - Posterior Lateral Branch:             The proximal PLB has luminal irregularities. There is DENIZ 3 flow.             The distal PLB is diffusely diseased (75). There is DENIZ 3 flow.     - Posterior Descending Artery:             The PDA has luminal irregularities. There is DENIZ 3 flow.    CAV as detailed below including 90% mid LAD ISR.    Occluded right EIV.    Occluded left CFV.    Occluded right IJV.        Hospital Course: He was started on IV Lasix for diureses at  "admit. In addition, INV CARDS was consulted and LHC/RHC was performed which revealed cccluded right EIV, left CFV, and right IJV. 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 PCWP = 40 mmHg, Mean RA = 30 mmHg, and Suman CI = 2.24. Unable to get Bx. DSA was sent which revealed CW2(41850),CW7(0634). He was administered a solumedrol 1gm bolus x 3 doses, followed by prednisone 20mg orally daily. Prednisone was decreased to 10mg orally daily on discharge. FK was increased to 2mg orally twice daily for goal of 5-10. MMF was started at 500mg orally daily. He reported being on rapamune at some point, but was unable to afford the cost. This was not restarted, as it is unclear how long he has been off of it. MMF was initiated with plan to increase dose to 1500mg as tolerated. OI prophylaxis includes nystatin for 1 month and bactrim SS for 6 months. Follow up potassium level needed. Valcyte was not started as CMV IgG was positive, and affordability. He will follow with [facility] in the future and will come back to clinic in 1-2 weeks with echo prior      Review of Systems   Constitution: Negative for chills, decreased appetite, diaphoresis, fever, weakness, malaise/fatigue, night sweats, weight gain and weight loss.   Cardiovascular: Negative for chest pain, claudication, cyanosis, dyspnea on exertion, irregular heartbeat, leg swelling, near-syncope, orthopnea and palpitations.   Respiratory: Negative for cough, hemoptysis, shortness of breath, sleep disturbances due to breathing, snoring, sputum production and wheezing.    Gastrointestinal: Negative for abdominal pain and diarrhea.       Objective:   Blood pressure 123/66, pulse 96, height 6' 1" (1.854 m), weight 94 kg (207 lb 3.7 oz).body mass index is 27.34 kg/m².    Physical Exam   Constitutional: He is oriented to person, place, and time. He appears well-developed and well-nourished.   HENT:   Head: Normocephalic and atraumatic.   Eyes: Conjunctivae and EOM are normal. Pupils are equal, " round, and reactive to light.   Neck: Normal range of motion. Neck supple. No JVD present.   Cardiovascular: Normal rate and regular rhythm. Exam reveals no gallop and no friction rub.   No murmur heard.  Pulmonary/Chest: Breath sounds normal. No respiratory distress. He has no wheezes. He has no rales. He exhibits no tenderness.   Abdominal: Soft. Bowel sounds are normal. He exhibits no distension and no mass. There is no hepatosplenomegaly, splenomegaly or hepatomegaly. There is no tenderness. There is no rebound, no guarding and no CVA tenderness.   No hepatoslenomegaly   Musculoskeletal: Normal range of motion. He exhibits no edema or tenderness.   Neurological: He is alert and oriented to person, place, and time. He has normal reflexes. No cranial nerve deficit. He exhibits normal muscle tone. Coordination normal.   Skin: Skin is warm and dry.   Psychiatric: He has a normal mood and affect.       Lab Results   Component Value Date    WBC 5.06 08/21/2018    HGB 14.4 08/21/2018    HCT 45.4 08/21/2018    MCV 86 08/21/2018     (L) 08/21/2018    CO2 27 11/05/2018    CREATININE 1.1 11/05/2018    CALCIUM 8.7 11/05/2018    ALBUMIN 3.0 (L) 11/05/2018    AST 30 11/05/2018     (H) 11/05/2018    ALT 23 11/05/2018       Lab Results   Component Value Date    INR 1.0 07/17/2018       BNP   Date Value Ref Range Status   11/05/2018 655 (H) 0 - 99 pg/mL Final     Comment:     Values of less than 100 pg/ml are consistent with non-CHF populations.   08/21/2018 424 (H) 0 - 99 pg/mL Final     Comment:     Values of less than 100 pg/ml are consistent with non-CHF populations.   07/17/2018 504 (H) 0 - 99 pg/mL Final     Comment:     Values of less than 100 pg/ml are consistent with non-CHF populations.       No results found for: LDH    Tacrolimus Lvl   Date Value Ref Range Status   08/21/2018 11.5 5.0 - 15.0 ng/mL Final     Comment:     Testing performed by Liquid Chromatography-Tandem  Mass Spectrometry  (LC-MS/MS).  This test was developed and its performance characteristics  determined by [facility], Department of Pathology  and Laboratory Medicine in a manner consistent with CLIA  requirements. It has not been cleared or approved by the [place]  Food and Drug Administration.  This test is used for clinical   purposes.  It should not be regarded as investigational or for  research.       No results found for: SIROLIMUS  No results found for: CYCLOSPORINE    No results found for this or any previous visit.  No results found for this or any previous visit.    Labs were reviewed with the patient.    Assessment:     1. Heart transplant failure and rejection    2. History of heart transplant    3. Chronic rejection of heart transplant    4. Hyperglycemia    5. Marijuana smoker    6. Noncompliance with medications        Plan:   Ambien 5 mg qhs    Await final echo report and CBC    Chest X ray    Return instructions as set forth by post transplant schedule or as needed:    Clinic: Return for labs and/or biopsy weekly the first month, every two weeks during month 2 and then monthly for the first year at the provider or coordinator's discretion. During the second year, return to clinic every 3 months. Post transplant year 3-5 return every 6 months. There will be a comprehensive post transplant evaluation every year that may include LHC/RHC/biopsy, stress test, echo, CXR, and other health screening exams.    In addition to the clinical assessment, I have ordered Allomap testing for this patient to assist in identification of moderate/severe acute cellular rejection (ACR) in a pt with stable Allograft function instead of endomyocardial biopsy.     Patient is reminded to call with any health changes as these can be early signs of transplant complications. Patient is advised to make sure any new medications or changes of old medications are discussed with a pharmacist or physician knowledgeable with transplant to avoid  rejection/drug toxicity related to significant drug interactions.    UNOS Patient Status  Functional Status: 90% - Able to carry on normal activity: minor symptoms of disease  Physical Capacity: Limited Mobility  Working for Income: No  If no, reason not working: Disability    Romeo Rodríguez MD

## 2018-11-06 NOTE — PROGRESS NOTES
Met with pt and mother for 3 month follow up. Pt had labs, Echo and clinic visit. Pt stated that he has trouble sleeping  and the only thing that helps is smoking  THC. Pt also stated that he sleeps sitting up because he feels a lot of weight on his chest.  Pt voices wanting help to stop smoking, but wants to get help with sleeping. Pt c/o coughing not productive- put in orders for CXR. Pt and mother states that the pt is more compliant with medications and has not missed any doses. 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 Pt states that he has a lot of diarrhea. Instructed pt to hold his magnesium at this time to see if this helps with diarrhea as he is on little Cellcept and had previous rejection episodes. Pt and mother verbalized understanding. Pt denies and N, V, fevers . Reviewed medications and gave pt a lab slip ( standing order ) for future labs. Reviewed with Dr. Rodríguez and pt will get a 30 day prescription for ambien and will follow up with PCP regarding sleep medications. Pt and mother verbalized understanding. Pt doing well.

## 2018-11-09 RX ORDER — LISINOPRIL 10 MG/1
10 TABLET ORAL DAILY
Qty: 90 TABLET | Refills: 3 | Status: SHIPPED | OUTPATIENT
Start: 2018-11-09 | End: 2019-08-19 | Stop reason: SDUPTHER

## 2018-11-09 RX ORDER — MYCOPHENOLATE MOFETIL 250 MG/1
1000 CAPSULE ORAL 2 TIMES DAILY
Qty: 240 CAPSULE | Refills: 11 | Status: SHIPPED | OUTPATIENT
Start: 2018-11-09 | End: 2019-08-19 | Stop reason: SDUPTHER

## 2018-11-09 NOTE — TELEPHONE ENCOUNTER
Reviewed pt's chart in chart review. Per review pt was unable to get biopsy while inpatient in July, per review try to get biopsy  Via [facility], increase cellcept to 1000 mg twice daily , stop nystatin ( per hospital d/c - d/c in 1 month  And bactrim- stop in January - per d/c note), start lisinopril 10  Mg daily.  And get repeat labs in 2 weeks after starting lisinopril.     Called pt and informed him of the changes. Pt read back instructions and verbalized understanding Pt stated that he may be able to come on Thursday next week for his biopsy. Sent medications to [facility]. Informed pt that I would send message in [facility] with the following also.

## 2018-11-14 ENCOUNTER — TELEPHONE (OUTPATIENT)
Dept: TRANSPLANT | Facility: CLINIC | Age: 28
End: 2018-11-14

## 2018-11-14 NOTE — TELEPHONE ENCOUNTER
----- Message from Olena Gonzalez sent at 11/14/2018  2:19 PM CST -----  Contact: Pt mom Jihan  Pt mom would like to know if he has a Biopsy scheduled for 11/15/18. . Please call Jihan @ [phone]. Thank you

## 2018-11-19 ENCOUNTER — TELEPHONE (OUTPATIENT)
Dept: TRANSPLANT | Facility: CLINIC | Age: 28
End: 2018-11-19

## 2018-11-19 NOTE — TELEPHONE ENCOUNTER
Left a voice message on pt's mother Vee's number [phone].    Clarified heart biopsy is the Tuesday 11/27 after   Thanksgiving and not Thanksgiving the 22nd.

## 2018-11-27 ENCOUNTER — HOSPITAL ENCOUNTER (OUTPATIENT)
Facility: HOSPITAL | Age: 28
Discharge: HOME | End: 2018-11-27
Attending: INTERNAL MEDICINE | Admitting: INTERNAL MEDICINE
Payer: MEDICAID

## 2018-11-27 DIAGNOSIS — Z94.1 STATUS POST HEART TRANSPLANT: ICD-10-CM

## 2018-11-27 PROCEDURE — 93451 RIGHT HEART CATH: CPT | Mod: 26,,, | Performed by: INTERNAL MEDICINE

## 2018-11-27 PROCEDURE — 25000003 PHARM REV CODE 250: Performed by: INTERNAL MEDICINE

## 2018-11-27 PROCEDURE — C1751 CATH, INF, PER/CENT/MIDLINE: HCPCS | Performed by: INTERNAL MEDICINE

## 2018-11-27 PROCEDURE — C1894 INTRO/SHEATH, NON-LASER: HCPCS | Performed by: INTERNAL MEDICINE

## 2018-11-27 PROCEDURE — 93451 RIGHT HEART CATH: CPT | Performed by: INTERNAL MEDICINE

## 2018-11-27 PROCEDURE — 27201423 OPTIME MED/SURG SUP & DEVICES STERILE SUPPLY: Performed by: INTERNAL MEDICINE

## 2018-11-27 RX ORDER — LIDOCAINE HYDROCHLORIDE 20 MG/ML
INJECTION, SOLUTION INFILTRATION; PERINEURAL
Status: DISCONTINUED | OUTPATIENT
Start: 2018-11-27 | End: 2018-11-27 | Stop reason: HOSPADM

## 2018-11-27 NOTE — INTERVAL H&P NOTE
The patient has been examined and the H&P has been reviewed:    I concur with the findings and no changes have occurred since H&P was written.     He feels well and denies symptoms  Known CAV, reduced LVEF and non-compliance with immunosuppression    JVP not noted sitting  Lungs clear  Resting S3 and S4 sitting  No edema  Lab Results   Component Value Date     (H) 11/27/2018     (H) 11/05/2018     (H) 08/21/2018       11/27/2018    K 3.8 11/27/2018     11/27/2018    CO2 30 (H) 11/27/2018    BUN 17 11/27/2018    CREATININE 0.9 11/27/2018    GLU 87 11/27/2018    AST 28 11/27/2018    ALT 25 11/27/2018    ALBUMIN 3.3 (L) 11/27/2018    PROT 6.0 11/27/2018    BILITOT 1.3 (H) 11/27/2018    WBC 7.96 11/27/2018    HGB 15.4 11/27/2018    HCT 48.2 11/27/2018     (L) 11/27/2018         This procedure has been fully reviewed with the patient and written informed consent has been obtained.    Proceed with echo guided heart biopsy.

## 2018-11-27 NOTE — DISCHARGE INSTRUCTIONS
AFTER THE PROCEDURE:   -DO NOT DRINK OR EAT ANYTHING UNTIL NO LONGER HOARSE   -You may remove the bandage in 24 hours and wash with soap and water.   -You may shower, but do not soak in a tub for three days.   PRECAUTIONS FOR THE NEXT 24 HOURS:   -If you need to cough, sneeze, have a bowel movement, or bear down, hold pressure over your bandage.   -Do not  anything heavier than a gallon of milk(about 5 pounds)   -Avoid excessive bending over.   SYMPTOMS TO WATCH FOR AND REPORT TO YOUR DOCTOR:   -BLEEDING: hold pressure over the site until bleeding stops. Proceed to Emergency Room by ambulance (do not drive yourself) if unable to stop bleeding. Notify your doctor.   -HEMATOMA(hard bruise under the skin): Javy around the bruise if one develops. Call your doctor if it increases in size or if you have difficulty talking, swallowing, breathing or anything unusual.   SIGNS OF INFECTION:Fever (temperature over 100.5 F), pus or redness   -RASH   -CHEST PAIN OR SHORTNESS OF BREATH   You may call you coordinator in the Heart Failure/Heart Transplant/Pulmonary Hypertension Clinic at [phone] during normal business hours(Monday through Friday from 8 A.M. to 5 P.M.) After hours, call the Heart Transplant Service doctor on call at [phone].

## 2018-11-27 NOTE — OP NOTE
RIJ entered but unable to thread micro wire.  LIJ entered and initially via posterior jugular but micro wire could not be steered toward the right in subclavian and instead went to left arm.  LIJ entered via middle approach and able to get micro wire into the SVC followed by succcessful placement of 9 Fr sheath.  The curved sheath introduced over swan patty and RA and RV pressures measured.  Unable to get bioptome through sheath due to tortuosity left subclavian left jugular junction.  Sheath not large enough to accommodate liam wire plus bioptome.  Left arm place in various positions to no avail thus procedure abandoned.    I did ultrasound of left subclavian but vein appears occluded narrowed and the occluded distal to dilated segment in the curve of clavicle and proximal to this area vein occluded as well.    Dr. Barahona found leg venous system occluded.    The only option I see is if we can obtain obtain a larger curved venous sheath could then use a bioptome.  Could also ask for formal vascular ultrasound of left subclavian to assure that my study today is correct.    RA pressure is elevated.    No complications.

## 2018-11-27 NOTE — DISCHARGE SUMMARY
[facility]  Discharge Note  Short Stay    Admit Date: 11/27/2018    Discharge Date and Time: 11/27/2018    Attending Physician: Raffy Stafford Jr.,*     Discharge Provider: Raffy Stafford Jr    Diagnoses:  Active Hospital Problems    Diagnosis  POA    *History of heart transplant [Z94.1]  Not Applicable    Chronic rejection of heart transplant [T86.21]  Yes    Atherosclerosis of native coronary artery of transplanted heart without angina pectoris [I25.811]  Yes    Heart transplant failure and rejection [T86.22, T86.21]  Yes      Resolved Hospital Problems   No resolved problems to display.       Discharged Condition: stable    Hospital Course: Patient was admitted for an outpatient procedure and tolerated the procedure well with no complications.  Unfortunately, unable to obtain access, see procedure note for further detail and recommendations.    Final Diagnoses: Same as principal problem.    Disposition: Home or Self Care    Follow up/Patient Instructions:    Medications:  Reconciled Home Medications:      Medication List      CONTINUE taking these medications    aspirin 81 MG EC tablet  Commonly known as:  ECOTRIN  Take 1 tablet (81 mg total) by mouth once daily.     atorvastatin 80 MG tablet  Commonly known as:  LIPITOR  Take 1 tablet (80 mg total) by mouth once daily.     clopidogrel 75 mg tablet  Commonly known as:  PLAVIX  Take 1 tablet (75 mg total) by mouth once daily.     furosemide 80 MG tablet  Commonly known as:  LASIX  Take 1 tablet (80 mg total) by mouth 2 (two) times daily.     lisinopril 10 MG tablet  Take 1 tablet (10 mg total) by mouth once daily.     magnesium oxide 400 mg (241.3 mg magnesium) tablet  Commonly known as:  MAG-OX  Take 1 tablet (400 mg total) by mouth 2 (two) times daily.     mycophenolate 250 mg Cap  Commonly known as:  CELLCEPT  Take 4 capsules (1,000 mg total) by mouth 2 (two) times daily.     predniSONE 10 MG tablet  Commonly known as:  DELTASONE  Take 1  tablet (10 mg total) by mouth once daily.     sulfamethoxazole-trimethoprim 400-80mg 400-80 mg per tablet  Commonly known as:  BACTRIM,SEPTRA  Take 1 tablet by mouth once daily.     tacrolimus 1 MG Cap  Commonly known as:  PROGRAF  Take 2 capsules (2 mg total) by mouth every 12 (twelve) hours.     zolpidem 5 MG Tab  Commonly known as:  AMBIEN  Take 1 tablet (5 mg total) by mouth nightly as needed.          No discharge procedures on file.  Follow-up Information     [facility]. Call in 1 week.    Specialty:  Transplant  Contact information:  [address]  [facility] [address] [phone]  Additional information:  3rd floor               Resume previous diet and activity; continue f/u with your physicians as directed prior to this procedure

## 2019-01-30 ENCOUNTER — TELEPHONE (OUTPATIENT)
Dept: TRANSPLANT | Facility: CLINIC | Age: 29
End: 2019-01-30

## 2019-01-30 NOTE — TELEPHONE ENCOUNTER
Pt's mother called from her home here in [place], states that she just got off of the phone with Genrebekah, he has been working in [place].  She states that he is having trouble breathing, SOB, Having chest pain and trouble getting out of the recliner.

## 2019-01-31 NOTE — TELEPHONE ENCOUNTER
Called pt's mother and she stated that he went to a local ER in [place], they told him he was in heart failure and d/c'd  Him. He is still in [place], and plans on travelling back on 2/2/19 to [place], unsure when he will be back home.

## 2019-02-11 ENCOUNTER — TELEPHONE (OUTPATIENT)
Dept: TRANSPLANT | Facility: CLINIC | Age: 29
End: 2019-02-11

## 2019-02-11 NOTE — TELEPHONE ENCOUNTER
Pt's mother called and stated that he is home now and needed an appt, I attempted to call her back.and left a message for her to let her know that he is scheduled for fasting labs/ echo and clinic visit on 2/14/19.

## 2019-02-14 ENCOUNTER — HOSPITAL ENCOUNTER (OUTPATIENT)
Dept: CARDIOLOGY | Facility: CLINIC | Age: 29
Discharge: HOME | End: 2019-02-14
Attending: INTERNAL MEDICINE
Payer: MEDICAID

## 2019-02-14 ENCOUNTER — OFFICE VISIT (OUTPATIENT)
Dept: TRANSPLANT | Facility: CLINIC | Age: 29
End: 2019-02-14
Payer: MEDICAID

## 2019-02-14 ENCOUNTER — TELEPHONE (OUTPATIENT)
Dept: TRANSPLANT | Facility: CLINIC | Age: 29
End: 2019-02-14

## 2019-02-14 VITALS
HEART RATE: 92 BPM | BODY MASS INDEX: 25.98 KG/M2 | DIASTOLIC BLOOD PRESSURE: 80 MMHG | DIASTOLIC BLOOD PRESSURE: 75 MMHG | SYSTOLIC BLOOD PRESSURE: 110 MMHG | HEIGHT: 73 IN | SYSTOLIC BLOOD PRESSURE: 127 MMHG | WEIGHT: 207 LBS | HEART RATE: 88 BPM | WEIGHT: 196 LBS | BODY MASS INDEX: 27.43 KG/M2 | HEIGHT: 73 IN

## 2019-02-14 DIAGNOSIS — Z94.1 HISTORY OF HEART TRANSPLANT: ICD-10-CM

## 2019-02-14 DIAGNOSIS — T86.21 HEART TRANSPLANT FAILURE AND REJECTION: ICD-10-CM

## 2019-02-14 DIAGNOSIS — E78.2 MIXED HYPERLIPIDEMIA: ICD-10-CM

## 2019-02-14 DIAGNOSIS — R73.9 HYPERGLYCEMIA: ICD-10-CM

## 2019-02-14 DIAGNOSIS — Z94.1 STATUS POST HEART TRANSPLANT: ICD-10-CM

## 2019-02-14 DIAGNOSIS — I50.43 ACUTE ON CHRONIC COMBINED SYSTOLIC AND DIASTOLIC HEART FAILURE: Primary | ICD-10-CM

## 2019-02-14 DIAGNOSIS — T86.22 HEART TRANSPLANT FAILURE AND REJECTION: ICD-10-CM

## 2019-02-14 DIAGNOSIS — Z79.899 ENCOUNTER FOR LONG-TERM (CURRENT) USE OF MEDICATIONS: ICD-10-CM

## 2019-02-14 DIAGNOSIS — T86.21 CHRONIC REJECTION OF HEART TRANSPLANT: ICD-10-CM

## 2019-02-14 LAB
ASCENDING AORTA: 2.9 CM
AV INDEX (PROSTH): 0.65
AV MEAN GRADIENT: 2.29 MMHG
AV PEAK GRADIENT: 3.84 MMHG
AV VALVE AREA: 2.5 CM2
AV VELOCITY RATIO: 0.66
BSA FOR ECHO PROCEDURE: 2.2 M2
CV ECHO LV RWT: 0.39 CM
DOP CALC AO PEAK VEL: 0.98 M/S
DOP CALC AO VTI: 18.12 CM
DOP CALC LVOT AREA: 3.87 CM2
DOP CALC LVOT DIAMETER: 2.22 CM
DOP CALC LVOT PEAK VEL: 0.65 M/S
DOP CALC LVOT STROKE VOLUME: 45.3 CM3
DOP CALCLVOT PEAK VEL VTI: 11.71 CM
E WAVE DECELERATION TIME: 133.34 MSEC
E/A RATIO: 2.97
E/E' RATIO: 8.37
ECHO LV POSTERIOR WALL: 0.88 CM (ref 0.6–1.1)
FRACTIONAL SHORTENING: 14 % (ref 28–44)
INTERVENTRICULAR SEPTUM: 0.96 CM (ref 0.6–1.1)
LEFT INTERNAL DIMENSION IN SYSTOLE: 3.92 CM (ref 2.1–4)
LEFT VENTRICLE DIASTOLIC VOLUME INDEX: 43.83 ML/M2
LEFT VENTRICLE DIASTOLIC VOLUME: 95.71 ML
LEFT VENTRICLE MASS INDEX: 64.3 G/M2
LEFT VENTRICLE SYSTOLIC VOLUME INDEX: 30.6 ML/M2
LEFT VENTRICLE SYSTOLIC VOLUME: 66.88 ML
LEFT VENTRICULAR INTERNAL DIMENSION IN DIASTOLE: 4.57 CM (ref 3.5–6)
LEFT VENTRICULAR MASS: 140.32 G
LV LATERAL E/E' RATIO: 8.07
LV SEPTAL E/E' RATIO: 8.69
MV PEAK A VEL: 0.38 M/S
MV PEAK E VEL: 1.13 M/S
PISA TR MAX VEL: 2.52 M/S
RA PRESSURE: 8 MMHG
RIGHT VENTRICULAR END-DIASTOLIC DIMENSION: 4.28 CM
RV TISSUE DOPPLER FREE WALL SYSTOLIC VELOCITY 1 (APICAL 4 CHAMBER VIEW): 7.84 M/S
SINUS: 3.1 CM
STJ: 2.43 CM
TDI LATERAL: 0.14
TDI SEPTAL: 0.13
TDI: 0.14
TR MAX PG: 25.4 MMHG
TRICUSPID ANNULAR PLANE SYSTOLIC EXCURSION: 1.23 CM
TV REST PULMONARY ARTERY PRESSURE: 33 MMHG

## 2019-02-14 PROCEDURE — 99999 PR PBB SHADOW E&M-EST. PATIENT-LVL III: ICD-10-PCS | Mod: PBBFAC,,, | Performed by: INTERNAL MEDICINE

## 2019-02-14 PROCEDURE — 93306 TRANSTHORACIC ECHO (TTE) COMPLETE: ICD-10-PCS | Mod: 26,S$PBB,, | Performed by: INTERNAL MEDICINE

## 2019-02-14 PROCEDURE — 99999 PR PBB SHADOW E&M-EST. PATIENT-LVL III: CPT | Mod: PBBFAC,,, | Performed by: INTERNAL MEDICINE

## 2019-02-14 PROCEDURE — 99213 OFFICE O/P EST LOW 20 MIN: CPT | Mod: PBBFAC | Performed by: INTERNAL MEDICINE

## 2019-02-14 PROCEDURE — 99215 PR OFFICE/OUTPT VISIT, EST, LEVL V, 40-54 MIN: ICD-10-PCS | Mod: S$PBB,,, | Performed by: INTERNAL MEDICINE

## 2019-02-14 PROCEDURE — 93306 TTE W/DOPPLER COMPLETE: CPT | Mod: PBBFAC | Performed by: INTERNAL MEDICINE

## 2019-02-14 PROCEDURE — 99215 OFFICE O/P EST HI 40 MIN: CPT | Mod: S$PBB,,, | Performed by: INTERNAL MEDICINE

## 2019-02-14 NOTE — PROGRESS NOTES
Subjective:   Mr. Smiley is a 28 y.o. year old white male who received a  heart transplant on 3/23/10.      CMV status:   Donor:   Recipient: +    HPI    27 yo male, new to our service, s/p OHTx in 2010 at [facility] presented to the ED with ~ 2 week h/o worsening CORTEZ and JUN that extends up to scrotum. He relates that he cut ties with the [facility] program ~ 2-3 years ago after undergoing coronary PCI's to his transplanted heart. Was doing well until April when he was hospitalized at [facility] for volume overload and was diuresed. Was seen by Dr. Holliday 7/6/18. TTE in February showed LVEF 53%, RV mod enlarge, LA markedly dilated and mild to mod MRFarzad TTE showed a decline in LVEF to 25-30% and was admitted here for management. He admited that he takes his meds sporadically. Supposed to be on Prograf 1 mg bid. Also supposed to take Plavix for his PCI's, and says he takes Lasix 80 mg from time to time.        Prograf 2/2  mg bid and prednisone 10 mg daily    Since last visit he has shortness of breath while in [place]. Now he is well     Echo today preliminary    EF 40%          CONCLUSIONS     1 - Moderately depressed left ventricular systolic function (EF 35-40%).     2 - Post-cardiac transplantation study.     3 - Mildly depressed right ventricular systolic function .     4 - Pulmonary hypertension. The estimated PA systolic pressure is 47 mmHg.     5 - Mitral annulus inversus, raises concern for constriction, shortned deceleration time, however no respiratory variation obtained..  LVEDP=40mmHg.          RHC performed via left IJV.    PCWP=40mmHg.    PA=62/40(5)mmHg.    RVSP=62mmHg.    Mean RA=30mmHg.    Suman CO=4.85.    Suman CI=2.24.     Angiographic Results     Diagnostic:          Patient has a right dominant coronary artery.        - Left Main Coronary Artery:             The LM has a 10% stenosis. There is DENIZ 3 flow. The remaining portion of the vessel has luminal irregularities (10).     - Left  Anterior Descending Artery:             The proximal LAD has luminal irregularities. There is DENIZ 3 flow.             The mid LAD has a 90% stenosis. There is DENIZ 3 flow. The remaining portion of the vessel has luminal irregularities (10). The 90% mid LAD stenosis is ISR.             The distal LAD has chronic total occlusion. There is DENIZ 0 flow.     - D1:             The D1 has luminal irregularities. There is DENIZ 3 flow.     - D2:             The D2 has luminal irregularities. There is DENIZ 3 flow.     - Left Circumflex Artery:             The proximal LCX has luminal irregularities. There is DENIZ 3 flow.             The distal LCX has luminal irregularities. There is DENIZ 3 flow. The remaining portion of the vessel is of small caliber.     - OM1:             The OM1 has luminal irregularities. There is EDNIZ 3 flow. The remaining portion of the vessel is of small caliber.     - OM2:             The OM2 has a 99% stenosis. There is DENIZ 3 flow. The remaining portion of the vessel is of small caliber.     - OM3:             The OM3 has a 60% stenosis. There is DENIZ 3 flow. The remaining portion of the vessel has luminal irregularities (10).     - Right Coronary Artery:             The proximal RCA has a 10% stenosis. There is DENIZ 3 flow. The remaining portion of the vessel has luminal irregularities (10).             The mid RCA has a 30% stenosis. There is DENIZ 3 flow. The remaining portion of the vessel has luminal irregularities (10).             The distal RCA is normal. There is DENIZ 3 flow.     - Posterior Lateral Branch:             The proximal PLB has luminal irregularities. There is DENIZ 3 flow.             The distal PLB is diffusely diseased (75). There is DENIZ 3 flow.     - Posterior Descending Artery:             The PDA has luminal irregularities. There is DENIZ 3 flow.    CAV as detailed below including 90% mid LAD ISR.    Occluded right EIV.    Occluded left CFV.    Occluded right  "IJV.        Hospital Course: He was started on IV Lasix for diureses at admit. In addition, INV CARDS was consulted and LHC/RHC was performed which revealed cccluded right EIV, left CFV, and right IJV. 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 PCWP = 40 mmHg, Mean RA = 30 mmHg, and Suman CI = 2.24. Unable to get Bx. DSA was sent which revealed CW2(29278),CW7(9280). He was administered a solumedrol 1gm bolus x 3 doses, followed by prednisone 20mg orally daily. Prednisone was decreased to 10mg orally daily on discharge. FK was increased to 2mg orally twice daily for goal of 5-10. MMF was started at 500mg orally daily. He reported being on rapamune at some point, but was unable to afford the cost. This was not restarted, as it is unclear how long he has been off of it. MMF was initiated with plan to increase dose to 1500mg as tolerated. OI prophylaxis includes nystatin for 1 month and bactrim SS for 6 months. Follow up potassium level needed. Valcyte was not started as CMV IgG was positive, and affordability. He will follow with [facility] in the future and will come back to clinic in 1-2 weeks with echo prior      Review of Systems   Constitution: Negative for chills, decreased appetite, diaphoresis, fever, weakness, malaise/fatigue, night sweats, weight gain and weight loss.   Cardiovascular: Negative for chest pain, claudication, cyanosis, dyspnea on exertion, irregular heartbeat, leg swelling, near-syncope, orthopnea and palpitations.   Respiratory: Negative for cough, hemoptysis, shortness of breath, sleep disturbances due to breathing, snoring, sputum production and wheezing.    Gastrointestinal: Negative for abdominal pain and diarrhea.       Objective:   Blood pressure 127/75, pulse 92, height 6' 1" (1.854 m), weight 88.9 kg (195 lb 15.8 oz).body mass index is 25.86 kg/m².    Physical Exam   Constitutional: He is oriented to person, place, and time. He appears well-developed and well-nourished.   HENT:   Head: Normocephalic " and atraumatic.   Eyes: Conjunctivae and EOM are normal. Pupils are equal, round, and reactive to light.   Neck: Normal range of motion. Neck supple. No JVD present.   Cardiovascular: Normal rate and regular rhythm. Exam reveals no gallop and no friction rub.   No murmur heard.  Pulmonary/Chest: Breath sounds normal. No respiratory distress. He has no wheezes. He has no rales. He exhibits no tenderness.   Abdominal: Soft. Bowel sounds are normal. He exhibits no distension and no mass. There is no hepatosplenomegaly, splenomegaly or hepatomegaly. There is no tenderness. There is no rebound, no guarding and no CVA tenderness.   No hepatoslenomegaly   Musculoskeletal: Normal range of motion. He exhibits no edema or tenderness.   Neurological: He is alert and oriented to person, place, and time. He has normal reflexes. No cranial nerve deficit. He exhibits normal muscle tone. Coordination normal.   Skin: Skin is warm and dry.   Psychiatric: He has a normal mood and affect.       Lab Results   Component Value Date    WBC 4.49 02/14/2019    HGB 15.5 02/14/2019    HCT 48.0 02/14/2019    MCV 87 02/14/2019     (L) 02/14/2019    CO2 29 02/14/2019    CREATININE 0.9 02/14/2019    CALCIUM 9.5 02/14/2019    ALBUMIN 3.5 02/14/2019    AST 37 02/14/2019     (H) 02/14/2019    ALT 36 02/14/2019       Lab Results   Component Value Date    INR 1.0 07/17/2018       BNP   Date Value Ref Range Status   02/14/2019 365 (H) 0 - 99 pg/mL Final     Comment:     Values of less than 100 pg/ml are consistent with non-CHF populations.   11/27/2018 426 (H) 0 - 99 pg/mL Final     Comment:     Values of less than 100 pg/ml are consistent with non-CHF populations.   11/05/2018 655 (H) 0 - 99 pg/mL Final     Comment:     Values of less than 100 pg/ml are consistent with non-CHF populations.       No results found for: LDH    Tacrolimus Lvl   Date Value Ref Range Status   11/05/2018 4.6 (L) 5.0 - 15.0 ng/mL Final     Comment:     Testing  performed by Liquid Chromatography-Tandem  Mass Spectrometry (LC-MS/MS).  This test was developed and its performance characteristics  determined by [facility], Department of Pathology  and Laboratory Medicine in a manner consistent with CLIA  requirements. It has not been cleared or approved by the [place]  Food and Drug Administration.  This test is used for clinical   purposes.  It should not be regarded as investigational or for  research.       No results found for: SIROLIMUS  No results found for: CYCLOSPORINE    No results found for this or any previous visit.  No results found for this or any previous visit.    Labs were reviewed with the patient.    Assessment:     1. Acute on chronic combined systolic and diastolic heart failure    2. Chronic rejection of heart transplant    3. Heart transplant failure and rejection    4. History of heart transplant    5. Hyperglycemia        Plan:   BNP lower and ef is 40%    We will continue same plan    Return instructions as set forth by post transplant schedule or as needed:    Clinic: Return for labs and/or biopsy weekly the first month, every two weeks during month 2 and then monthly for the first year at the provider or coordinator's discretion. During the second year, return to clinic every 3 months. Post transplant year 3-5 return every 6 months. There will be a comprehensive post transplant evaluation every year that may include LHC/RHC/biopsy, stress test, echo, CXR, and other health screening exams.    In addition to the clinical assessment, I have ordered Allomap testing for this patient to assist in identification of moderate/severe acute cellular rejection (ACR) in a pt with stable Allograft function instead of endomyocardial biopsy.     Patient is reminded to call with any health changes as these can be early signs of transplant complications. Patient is advised to make sure any new medications or changes of old medications are discussed with a  pharmacist or physician knowledgeable with transplant to avoid rejection/drug toxicity related to significant drug interactions.    UNOS Patient Status  Functional Status: 90% - Able to carry on normal activity: minor symptoms of disease  Physical Capacity: Limited Mobility  Working for Income: No  If no, reason not working: Disability    Romeo Rodríguez MD

## 2019-02-14 NOTE — TELEPHONE ENCOUNTER
Spoke with pt's mother, could not reach pt at numbers listed. Explained that his tacrolimus level is lower then needed and we saw him in clinic today and he stated that he has been taking his medications. I would like to repeat a level closer to home.   Pt is not home today, and can not reach him by phone.   Pt's mother stated understanding and states risk of rejection.

## 2019-04-24 ENCOUNTER — TELEPHONE (OUTPATIENT)
Dept: TRANSPLANT | Facility: CLINIC | Age: 29
End: 2019-04-24

## 2019-04-24 NOTE — TELEPHONE ENCOUNTER
Pt's mother called and stated that pt took her medication by accident for about 3 days, he took his medication as well, but her bottles where by his and she is missing 3 days. He took Levothyroxine 0.88 mcg, gabapentin, protonix with his medications. I asked to see about labeling the bottles different, she plans on filling a medication box for him and with him so he can learn how to do.    Reviewed f/u in August.

## 2019-06-14 ENCOUNTER — TELEPHONE (OUTPATIENT)
Dept: TRANSPLANT | Facility: CLINIC | Age: 29
End: 2019-06-14

## 2019-06-14 DIAGNOSIS — T86.20 COMPLICATION OF HEART TRANSPLANT, UNSPECIFIED COMPLICATION: ICD-10-CM

## 2019-06-14 DIAGNOSIS — R06.02 SHORTNESS OF BREATH: ICD-10-CM

## 2019-06-14 DIAGNOSIS — Z94.1 STATUS POST HEART TRANSPLANT: ICD-10-CM

## 2019-06-14 DIAGNOSIS — Z79.899 ENCOUNTER FOR LONG-TERM (CURRENT) USE OF MEDICATIONS: ICD-10-CM

## 2019-08-14 ENCOUNTER — PATIENT MESSAGE (OUTPATIENT)
Dept: TRANSPLANT | Facility: CLINIC | Age: 29
End: 2019-08-14

## 2019-08-14 ENCOUNTER — TELEPHONE (OUTPATIENT)
Dept: TRANSPLANT | Facility: CLINIC | Age: 29
End: 2019-08-14

## 2019-08-14 NOTE — LETTER
[facility] [address] [place] [address]  Phone: [phone]     August 14, 2019    [address] [place] [id number]               Dear Jamar Smiley:    Patient: Jamar Smiley   MR Number: [id number]   YOB: 1990     We hope this letter finds you well. You No Showed or called about your appointment on 8/8/19 in the Post Heart  transplant department that was scheduled for . Please contact us at [phone] to re-schedule your appointment.                                                                             Sincerely,       Jelly Fox RN, BSN  Post Heart Transplant Coordinator   [facility]  [address] [place] [phone]

## 2019-08-14 NOTE — TELEPHONE ENCOUNTER
Attempted to reach pt via phone regarding No Show last week, hoping he is well, called Father's number as well and no answer.    Letter sent via mail. And message sent via message through [employer].

## 2019-08-16 DIAGNOSIS — Z94.1 STATUS POST HEART TRANSPLANT: Primary | ICD-10-CM

## 2019-08-16 RX ORDER — TACROLIMUS 1 MG/1
2 CAPSULE ORAL EVERY 12 HOURS
Qty: 16 CAPSULE | Refills: 0 | Status: CANCELLED | OUTPATIENT
Start: 2019-08-16 | End: 2020-01-01

## 2019-08-16 NOTE — TELEPHONE ENCOUNTER
Pt's mother called requesting refills on all of his medication. She states he has been out and not sure when the last time he took his medication. She states he has not been feeling well, but doesn't want to go to hospital. I advised he was a no show for his appts last week. She was not aware. She was going to take him to the local hospital Monday morning for labs. Informed pt's mother he needs to be seen for his refills, especially if he is not feeling well. His tacro level back in Feb was 2.9, pt was asked to recheck labs, however did not. Pt's mother stated she can have his father drive him on Monday or Tuesday to clinic. Appts made for Dr. Stafford, lab and ECHO. Pt's mother stated her understanding and will get the pt to his appts. Refills for 5 days sent to his pharmacy. Will refill all other medication in clinic on Monday.

## 2019-08-19 ENCOUNTER — OFFICE VISIT (OUTPATIENT)
Dept: TRANSPLANT | Facility: CLINIC | Age: 29
End: 2019-08-19
Attending: INTERNAL MEDICINE
Payer: MEDICAID

## 2019-08-19 ENCOUNTER — TELEPHONE (OUTPATIENT)
Dept: TRANSPLANT | Facility: CLINIC | Age: 29
End: 2019-08-19

## 2019-08-19 ENCOUNTER — HOSPITAL ENCOUNTER (OUTPATIENT)
Dept: CARDIOLOGY | Facility: CLINIC | Age: 29
Discharge: HOME | End: 2019-08-19
Attending: INTERNAL MEDICINE
Payer: MEDICAID

## 2019-08-19 ENCOUNTER — PATIENT MESSAGE (OUTPATIENT)
Dept: TRANSPLANT | Facility: CLINIC | Age: 29
End: 2019-08-19

## 2019-08-19 VITALS
HEIGHT: 73 IN | DIASTOLIC BLOOD PRESSURE: 72 MMHG | HEART RATE: 84 BPM | WEIGHT: 194.25 LBS | SYSTOLIC BLOOD PRESSURE: 106 MMHG | BODY MASS INDEX: 25.74 KG/M2

## 2019-08-19 VITALS
HEIGHT: 73 IN | HEART RATE: 88 BPM | SYSTOLIC BLOOD PRESSURE: 106 MMHG | BODY MASS INDEX: 25.71 KG/M2 | WEIGHT: 194 LBS | DIASTOLIC BLOOD PRESSURE: 72 MMHG

## 2019-08-19 DIAGNOSIS — Z94.1 HISTORY OF HEART TRANSPLANT: Primary | ICD-10-CM

## 2019-08-19 DIAGNOSIS — Z79.899 ENCOUNTER FOR LONG-TERM (CURRENT) USE OF MEDICATIONS: ICD-10-CM

## 2019-08-19 DIAGNOSIS — Z94.1 STATUS POST HEART TRANSPLANT: ICD-10-CM

## 2019-08-19 DIAGNOSIS — T86.21 CHRONIC REJECTION OF HEART TRANSPLANT: ICD-10-CM

## 2019-08-19 DIAGNOSIS — Z91.148 NONCOMPLIANCE WITH MEDICATIONS: ICD-10-CM

## 2019-08-19 DIAGNOSIS — R06.02 SHORTNESS OF BREATH: ICD-10-CM

## 2019-08-19 DIAGNOSIS — I25.811 ATHEROSCLEROSIS OF NATIVE CORONARY ARTERY OF TRANSPLANTED HEART WITHOUT ANGINA PECTORIS: ICD-10-CM

## 2019-08-19 DIAGNOSIS — E78.5 HYPERLIPIDEMIA LDL GOAL <70: ICD-10-CM

## 2019-08-19 DIAGNOSIS — F12.90 MARIJUANA SMOKER: ICD-10-CM

## 2019-08-19 DIAGNOSIS — T86.20 COMPLICATION OF HEART TRANSPLANT, UNSPECIFIED COMPLICATION: ICD-10-CM

## 2019-08-19 DIAGNOSIS — I50.42 CHRONIC COMBINED SYSTOLIC AND DIASTOLIC HEART FAILURE: ICD-10-CM

## 2019-08-19 PROBLEM — R73.9 HYPERGLYCEMIA: Status: RESOLVED | Noted: 2018-07-18 | Resolved: 2019-08-19

## 2019-08-19 LAB
ASCENDING AORTA: 2.94 CM
AV INDEX (PROSTH): 0.74
AV MEAN GRADIENT: 2 MMHG
AV PEAK GRADIENT: 3 MMHG
AV VALVE AREA: 2.17 CM2
AV VELOCITY RATIO: 0.81
BSA FOR ECHO PROCEDURE: 2.13 M2
CV ECHO LV RWT: 0.37 CM
DOP CALC AO PEAK VEL: 0.88 M/S
DOP CALC AO VTI: 15.37 CM
DOP CALC LVOT AREA: 2.9 CM2
DOP CALC LVOT DIAMETER: 1.93 CM
DOP CALC LVOT PEAK VEL: 0.71 M/S
DOP CALC LVOT STROKE VOLUME: 33.3 CM3
DOP CALCLVOT PEAK VEL VTI: 11.39 CM
E/E' RATIO: 7.79 M/S
ECHO LV POSTERIOR WALL: 0.95 CM (ref 0.6–1.1)
FRACTIONAL SHORTENING: 20 % (ref 28–44)
INTERVENTRICULAR SEPTUM: 0.95 CM (ref 0.6–1.1)
LEFT INTERNAL DIMENSION IN SYSTOLE: 4.04 CM (ref 2.1–4)
LEFT VENTRICLE DIASTOLIC VOLUME INDEX: 57.42 ML/M2
LEFT VENTRICLE DIASTOLIC VOLUME: 121.96 ML
LEFT VENTRICLE MASS INDEX: 82 G/M2
LEFT VENTRICLE SYSTOLIC VOLUME INDEX: 33.8 ML/M2
LEFT VENTRICLE SYSTOLIC VOLUME: 71.81 ML
LEFT VENTRICULAR INTERNAL DIMENSION IN DIASTOLE: 5.07 CM (ref 3.5–6)
LEFT VENTRICULAR MASS: 173.89 G
LV LATERAL E/E' RATIO: 8.07 M/S
LV SEPTAL E/E' RATIO: 7.53 M/S
MV PEAK E VEL: 1.13 M/S
PISA TR MAX VEL: 2.19 M/S
RA PRESSURE: 15 MMHG
RIGHT VENTRICULAR END-DIASTOLIC DIMENSION: 4.37 CM
RV TISSUE DOPPLER FREE WALL SYSTOLIC VELOCITY 1 (APICAL 4 CHAMBER VIEW): 6.25 CM/S
SINUS: 3.01 CM
STJ: 2.48 CM
TDI LATERAL: 0.14 M/S
TDI SEPTAL: 0.15 M/S
TDI: 0.15 M/S
TR MAX PG: 19 MMHG
TRICUSPID ANNULAR PLANE SYSTOLIC EXCURSION: 1.2 CM
TV REST PULMONARY ARTERY PRESSURE: 34 MMHG

## 2019-08-19 PROCEDURE — 99999 PR PBB SHADOW E&M-EST. PATIENT-LVL III: CPT | Mod: PBBFAC,,, | Performed by: INTERNAL MEDICINE

## 2019-08-19 PROCEDURE — 99213 OFFICE O/P EST LOW 20 MIN: CPT | Mod: PBBFAC,25 | Performed by: INTERNAL MEDICINE

## 2019-08-19 PROCEDURE — 99999 PR PBB SHADOW E&M-EST. PATIENT-LVL III: ICD-10-PCS | Mod: PBBFAC,,, | Performed by: INTERNAL MEDICINE

## 2019-08-19 PROCEDURE — 93306 TTE W/DOPPLER COMPLETE: CPT | Mod: PBBFAC | Performed by: INTERNAL MEDICINE

## 2019-08-19 PROCEDURE — 99215 PR OFFICE/OUTPT VISIT, EST, LEVL V, 40-54 MIN: ICD-10-PCS | Mod: S$PBB,,, | Performed by: INTERNAL MEDICINE

## 2019-08-19 PROCEDURE — 99215 OFFICE O/P EST HI 40 MIN: CPT | Mod: S$PBB,,, | Performed by: INTERNAL MEDICINE

## 2019-08-19 PROCEDURE — 93306 TRANSTHORACIC ECHO (TTE) COMPLETE (CUPID ONLY): ICD-10-PCS | Mod: 26,S$PBB,, | Performed by: INTERNAL MEDICINE

## 2019-08-19 RX ORDER — LANOLIN ALCOHOL/MO/W.PET/CERES
400 CREAM (GRAM) TOPICAL 2 TIMES DAILY
Refills: 0 | COMMUNITY
Start: 2019-08-19

## 2019-08-19 RX ORDER — FUROSEMIDE 80 MG/1
80 TABLET ORAL 2 TIMES DAILY
Qty: 7 TABLET | Refills: 0 | Status: CANCELLED | OUTPATIENT
Start: 2019-08-19 | End: 2020-01-01

## 2019-08-19 RX ORDER — MYCOPHENOLATE MOFETIL 250 MG/1
1000 CAPSULE ORAL 2 TIMES DAILY
Qty: 28 CAPSULE | Refills: 0 | Status: CANCELLED | OUTPATIENT
Start: 2019-08-19 | End: 2020-01-01

## 2019-08-19 RX ORDER — ATORVASTATIN CALCIUM 80 MG/1
80 TABLET, FILM COATED ORAL DAILY
Qty: 5 TABLET | Refills: 0 | Status: CANCELLED | OUTPATIENT
Start: 2019-08-19 | End: 2020-01-01

## 2019-08-19 RX ORDER — SULFAMETHOXAZOLE AND TRIMETHOPRIM 400; 80 MG/1; MG/1
1 TABLET ORAL DAILY
Qty: 5 TABLET | Refills: 0 | Status: CANCELLED | OUTPATIENT
Start: 2019-08-19

## 2019-08-19 RX ORDER — ASPIRIN 81 MG/1
81 TABLET ORAL DAILY
Qty: 30 TABLET | Refills: 11 | OUTPATIENT
Start: 2019-08-19 | End: 2020-01-01

## 2019-08-19 RX ORDER — TACROLIMUS 1 MG/1
2 CAPSULE ORAL EVERY 12 HOURS
Qty: 120 CAPSULE | Refills: 11 | Status: SHIPPED | OUTPATIENT
Start: 2019-08-19 | End: 2020-01-01 | Stop reason: SDUPTHER

## 2019-08-19 RX ORDER — CLOPIDOGREL BISULFATE 75 MG/1
75 TABLET ORAL DAILY
Qty: 5 TABLET | Refills: 0 | Status: CANCELLED | OUTPATIENT
Start: 2019-08-19

## 2019-08-19 RX ORDER — TACROLIMUS 1 MG/1
2 CAPSULE ORAL EVERY 12 HOURS
Qty: 120 CAPSULE | Refills: 11 | Status: CANCELLED | OUTPATIENT
Start: 2019-08-19 | End: 2020-01-01

## 2019-08-19 RX ORDER — ATORVASTATIN CALCIUM 80 MG/1
80 TABLET, FILM COATED ORAL NIGHTLY
Qty: 30 TABLET | Refills: 5 | Status: SHIPPED | OUTPATIENT
Start: 2019-08-19 | End: 2020-01-01 | Stop reason: SDUPTHER

## 2019-08-19 RX ORDER — CLOPIDOGREL BISULFATE 75 MG/1
75 TABLET ORAL DAILY
Qty: 30 TABLET | Refills: 5 | Status: SHIPPED | OUTPATIENT
Start: 2019-08-19 | End: 2020-01-01 | Stop reason: SDUPTHER

## 2019-08-19 RX ORDER — ASPIRIN 81 MG/1
81 TABLET ORAL DAILY
Qty: 5 TABLET | Refills: 0 | Status: CANCELLED | OUTPATIENT
Start: 2019-08-19 | End: 2020-01-01

## 2019-08-19 RX ORDER — LISINOPRIL 10 MG/1
10 TABLET ORAL DAILY
Qty: 5 TABLET | Refills: 0 | Status: CANCELLED | OUTPATIENT
Start: 2019-08-19 | End: 2020-01-01

## 2019-08-19 RX ORDER — PREDNISONE 10 MG/1
10 TABLET ORAL DAILY
Qty: 30 TABLET | Refills: 5 | Status: SHIPPED | OUTPATIENT
Start: 2019-08-19 | End: 2020-01-01 | Stop reason: SDUPTHER

## 2019-08-19 RX ORDER — PREDNISONE 10 MG/1
10 TABLET ORAL DAILY
Qty: 5 TABLET | Refills: 0 | Status: CANCELLED | OUTPATIENT
Start: 2019-08-19

## 2019-08-19 RX ORDER — LISINOPRIL 10 MG/1
10 TABLET ORAL DAILY
Qty: 30 TABLET | Refills: 5 | Status: SHIPPED | OUTPATIENT
Start: 2019-08-19 | End: 2020-01-01 | Stop reason: SDUPTHER

## 2019-08-19 RX ORDER — MYCOPHENOLATE MOFETIL 250 MG/1
1000 CAPSULE ORAL 2 TIMES DAILY
Qty: 240 CAPSULE | Refills: 5 | Status: SHIPPED | OUTPATIENT
Start: 2019-08-19 | End: 2020-01-01 | Stop reason: SDUPTHER

## 2019-08-19 RX ORDER — FUROSEMIDE 80 MG/1
80 TABLET ORAL 2 TIMES DAILY
Qty: 60 TABLET | Refills: 11 | Status: SHIPPED | OUTPATIENT
Start: 2019-08-19 | End: 2020-01-01 | Stop reason: SDUPTHER

## 2019-08-19 RX ORDER — SULFAMETHOXAZOLE AND TRIMETHOPRIM 400; 80 MG/1; MG/1
1 TABLET ORAL DAILY
Qty: 30 TABLET | Refills: 5 | OUTPATIENT
Start: 2019-08-19

## 2019-08-19 NOTE — PROGRESS NOTES
Subjective:   Mr. Smiley is a 28 y.o. year old white male who received a  heart transplant on 3/23/10 at [facility].      CMV status:   Donor: unknown--[facility] did tx  Recipient: +    HPI  27 yo male s/p OHTx in 2010 [facility] was seen in Feb 2019.  He was admitted July 2018 with CHF.  Hospital course included diuresis and angiogram.  LHC/RHC was performed which revealed cccluded right EIV, left CFV, and right IJV. 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 PCWP = 40 mmHg, Mean RA = 30 mmHg, and Suman CI = 2.24. Unable to get Bx. DSA was sent which revealed CW2(88816),CW7(3969). He was administered a solumedrol 1gm bolus x 3 doses, followed by prednisone 20mg orally daily. Prednisone was decreased to 10mg orally daily on discharge. FK was increased to 2mg orally twice daily for goal of 5-10. MMF was started at 500mg orally daily. He reported being on rapamune at some point, but was unable to afford the cost. This was not restarted, as it is unclear how long he has been off of it. MMF was initiated with plan to increase dose to 1500mg as tolerated.  We have not been able to biopsy due to lack of venous access.      He has hx of non-compliance which has persisted even after developing CAV and CHF post-transplant.  He was last seen Feb 2019 and that took awhile to accomplish from July 2018 hospitalization and did not show for his scheduled appt earlier this month.  He reports some CORTEZ and says that he ran out of prednisone 2-4 days ago.      I just spoke with pharmacist at [facility] where he gets his meds.  He picked up 30 day supply of meds Sept, Nov and Dec 2018 (missed Oct) and then did not  until April 2019.  He has not picked up any since that time.    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  Some dyspnea at times. Sleeps on 1-2 pillows without PND.  He says that he is good about taking AM meds but not good about PM meds and in fact says that as a rule he does not take PM meds.      Current  "immunosuppression supposed to be on Prograf 2/2  mg bid and prednisone 10 mg daily    Review of Systems   Constitution: Negative for chills, decreased appetite, diaphoresis, fever, malaise/fatigue, night sweats, weight gain and weight loss.   HENT: Negative for congestion, hoarse voice, odynophagia and sore throat.    Cardiovascular: Positive for dyspnea on exertion and leg swelling (sometimes). Negative for chest pain, claudication, cyanosis, irregular heartbeat, near-syncope, orthopnea (sleeping on 1-2 pillows), palpitations and paroxysmal nocturnal dyspnea.   Respiratory: Positive for cough (dry cough intermittently). Negative for hemoptysis, shortness of breath, sleep disturbances due to breathing, snoring, sputum production and wheezing.    Hematologic/Lymphatic: Does not bruise/bleed easily.   Skin: Negative for suspicious lesions.   Musculoskeletal: Negative for arthritis, back pain, joint pain, joint swelling and myalgias.   Gastrointestinal: Negative for abdominal pain, anorexia, change in bowel habit, diarrhea, hematemesis, hematochezia and melena.   Genitourinary: Negative for dysuria, flank pain, frequency and hematuria.   Neurological: Negative for brief paralysis, dizziness, focal weakness, headaches, loss of balance, numbness, paresthesias, seizures and weakness.   Psychiatric/Behavioral: Positive for substance abuse (marijuana, daily smoker). Negative for altered mental status.     Objective:   Blood pressure 106/72, pulse 84, height 6' 1" (1.854 m), weight 88.1 kg (194 lb 3.6 oz).body mass index is 25.62 kg/m².  Physical Exam   Constitutional: He is oriented to person, place, and time. He appears well-developed and well-nourished. No distress.   /72 (BP Location: Right arm, Patient Position: Sitting, BP Method: Medium (Automatic))   Pulse 84   Ht 6' 1" (1.854 m)   Wt 88.1 kg (194 lb 3.6 oz)   BMI 25.62 kg/m²    HENT:   Head: Normocephalic and atraumatic.   Eyes: Conjunctivae are " normal. Right eye exhibits no discharge. Left eye exhibits no discharge. No scleral icterus.   Neck: No JVD (not visible but known occlusion bilateral) present. No tracheal deviation present. No thyromegaly present.   Cardiovascular: Normal rate, regular rhythm and normal heart sounds. Exam reveals no gallop.   No murmur heard.  Pulmonary/Chest: Effort normal and breath sounds normal.   Abdominal: Soft. Bowel sounds are normal. He exhibits distension (liver span 14 cm). He exhibits no mass. There is no tenderness. There is no rebound and no guarding.   Musculoskeletal: He exhibits edema (trace edema shins; able to wear boots). He exhibits no tenderness.   Neurological: He is alert and oriented to person, place, and time.   Skin: Skin is warm and dry. He is not diaphoretic.   Psychiatric: He has a normal mood and affect. His behavior is normal. Thought content normal.     Lab Results   Component Value Date     (H) 08/19/2019     (H) 02/14/2019     (H) 11/27/2018       08/19/2019    K 3.9 08/19/2019    MG 2.2 08/19/2019     08/19/2019    CO2 31 (H) 08/19/2019    BUN 15 08/19/2019    CREATININE 1.1 08/19/2019     08/19/2019    HGBA1C 5.3 08/19/2019    AST 35 08/19/2019    ALT 31 08/19/2019    ALBUMIN 3.8 08/19/2019    PROT 6.8 08/19/2019    BILITOT 1.1 (H) 08/19/2019    WBC 4.47 08/19/2019    HGB 14.7 08/19/2019    HCT 47.0 08/19/2019     (L) 08/19/2019    TSH 1.997 08/19/2019    CHOL 112 (L) 08/19/2019    HDL 51 08/19/2019    LDLCALC 47.4 (L) 08/19/2019    TRIG 68 08/19/2019    C9SLXXP 9.6 08/19/2019    TACROLIMUS 2.4 (L) 08/19/2019 8/19/19 ECHO Conclusion--I reviewed echo and no change   · Heart transplant 3/23/2010  · Mildly decreased left ventricular systolic function. The estimated ejection fraction is 40%  · Mild right ventricular enlargement. Mildly to moderately reduced right ventricular systolic function.  · Mild tricuspid regurgitation.  · Left ventricular  diastolic dysfunction.  · Severe right atrial enlargement.  · Cardiac transplantation. left atrial enlargement.  · Elevated central venous pressure (15 mm Hg).  · The estimated PA systolic pressure is 34 mm Hg       Assessment:     1. History of heart transplant    2. Noncompliance with medications    3. Chronic rejection of heart transplant    4. Chronic combined systolic and diastolic heart failure    5. Atherosclerosis of native coronary artery of transplanted heart without angina pectoris    6. Hyperlipidemia LDL goal <70    7. Marijuana smoker        Plan:   After he left office for echo I spoke with pharmacist at [facility] where he gets his meds.  He picked up 30 day supply of meds Sept, Nov and Dec 2018 (missed Oct) and then did not  until April 2019--missed 7 of 8 months 2019 thus far.     I called in treatment for tacro to [facility] and instructed him to  as his pharmacist was not sure that he would have a supply.  He did not go to     No real change in BNP or echo so observe    Long and kathleen discussion with the patient that he was granted a second chance with a heart transplant but that if he continues to not take care of the heart he will suffer consequences in terms of graft failure, rejection, heart failure, death.  Also shared that in my opinion he would not be offered re-transplant with his record of non-compliance.     He expressed understanding of all.    Return instructions as set forth by post transplant schedule or as needed:    Clinic: Return for labs and/or biopsy weekly the first month, every two weeks during month 2 and then monthly for the first year at the provider or coordinator's discretion. During the second year, return to clinic every 3 months. Post transplant year 3-5 return every 6 months. There will be a comprehensive post transplant evaluation every year that may include LHC/RHC/biopsy, stress test, echo, CXR, and other health screening  exams.    In addition to the clinical assessment, I have ordered Allomap testing for this patient to assist in identification of moderate/severe acute cellular rejection (ACR) in a pt with stable Allograft function instead of endomyocardial biopsy.     Patient is reminded to call with any health changes as these can be early signs of transplant complications. Patient is advised to make sure any new medications or changes of old medications are discussed with a pharmacist or physician knowledgeable with transplant to avoid rejection/drug toxicity related to significant drug interactions.    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

## 2019-08-19 NOTE — TELEPHONE ENCOUNTER
Called pt to explain the tacrolimus in the pharmacy here, his local pharmacy is unable to get.   No answer on his phone, echo was completed and would like to go over results. Pt was instructed not to leave until the results.      Pt came to clinic and discussed that [facility] can not get his Tacrolimus, Dr. Stafford sending prescription to [facility]. Instructed that he should  prescription today at [facility]. Pt asked the location of pharmacy, Directions explained and pt restated the directions and stated he will   Get prescription.

## 2019-08-20 NOTE — TELEPHONE ENCOUNTER
Met with pt 8/19/19, pt ended up canceling his prescription for tacrolimus at the [facility]

## 2019-08-22 ENCOUNTER — CONFERENCE (OUTPATIENT)
Dept: TRANSPLANT | Facility: CLINIC | Age: 29
End: 2019-08-22

## 2019-09-23 ENCOUNTER — TELEPHONE (OUTPATIENT)
Dept: CARDIOLOGY | Facility: CLINIC | Age: 29
End: 2019-09-23

## 2019-09-25 ENCOUNTER — TELEPHONE (OUTPATIENT)
Dept: TRANSPLANT | Facility: CLINIC | Age: 29
End: 2019-09-25

## 2019-11-01 ENCOUNTER — TELEPHONE (OUTPATIENT)
Dept: TRANSPLANT | Facility: CLINIC | Age: 29
End: 2019-11-01

## 2019-11-01 NOTE — TELEPHONE ENCOUNTER
Called pt phone not in service- .     Called pt's mom to see if pt had a working car or transportation. Pt's mother stated that he is in [place] working. Verbalized understanding and informed pt's mother to have pt call to see if we can get labs out there or if pt would like for us to transfer his care to an [place] transplant center. Pt's mother stated that she will get right on it.

## 2019-11-13 ENCOUNTER — TELEPHONE (OUTPATIENT)
Dept: TRANSPLANT | Facility: CLINIC | Age: 29
End: 2019-11-13

## 2019-11-13 NOTE — TELEPHONE ENCOUNTER
Called pt's mom at phone number below, left a message and scheduled appt's for 12/10/19, mailed an appt slip to pt's address and asked if he can call if he needs to change appt.  ----- Message from Carie Quinn sent at 11/13/2019  2:36 PM CST -----  Contact: Jihan Reaves Pls call pt's Mother Jihan at [phone]. She wants to know when you want to see him.    Thank you

## 2019-11-18 ENCOUNTER — TELEPHONE (OUTPATIENT)
Dept: TRANSPLANT | Facility: CLINIC | Age: 29
End: 2019-11-18

## 2019-11-18 NOTE — TELEPHONE ENCOUNTER
Pt's mother calling about scheduling Him a an appointment, I asked if he was in town and explained that I scheduled him for Dec 10, 2019  And mailed appointment slip out on Friday.    She stated that he was having pain in his chest and that he had this before due to the fluid pills, I explained that if he is having chest pain and a cough he needs to go to the ER, she stated that they are in the car and she is across the street looking at one.  Urged pt to be seen and if pt's mother could call me back to let me know how he is doing.

## 2020-01-01 ENCOUNTER — HOSPITAL ENCOUNTER (OUTPATIENT)
Dept: CARDIOLOGY | Facility: HOSPITAL | Age: 30
Discharge: HOME | End: 2020-06-22
Attending: INTERNAL MEDICINE
Payer: MEDICAID

## 2020-01-01 ENCOUNTER — OFFICE VISIT (OUTPATIENT)
Dept: TRANSPLANT | Facility: CLINIC | Age: 30
End: 2020-01-01
Payer: MEDICAID

## 2020-01-01 ENCOUNTER — DOCUMENTATION ONLY (OUTPATIENT)
Dept: CARDIOLOGY | Facility: CLINIC | Age: 30
End: 2020-01-01

## 2020-01-01 ENCOUNTER — TELEPHONE (OUTPATIENT)
Dept: TRANSPLANT | Facility: HOSPITAL | Age: 30
End: 2020-01-01

## 2020-01-01 ENCOUNTER — TELEPHONE (OUTPATIENT)
Dept: TRANSPLANT | Facility: CLINIC | Age: 30
End: 2020-01-01

## 2020-01-01 ENCOUNTER — OFFICE VISIT (OUTPATIENT)
Dept: CARDIOLOGY | Facility: CLINIC | Age: 30
End: 2020-01-01
Payer: MEDICAID

## 2020-01-01 ENCOUNTER — HOSPITAL ENCOUNTER (OUTPATIENT)
Dept: CARDIOLOGY | Facility: HOSPITAL | Age: 30
Discharge: HOME | End: 2020-07-20
Attending: INTERNAL MEDICINE
Payer: MEDICAID

## 2020-01-01 ENCOUNTER — HOSPITAL ENCOUNTER (OUTPATIENT)
Facility: HOSPITAL | Age: 30
Discharge: HOME | End: 2020-07-21
Attending: INTERNAL MEDICINE | Admitting: INTERNAL MEDICINE
Payer: MEDICAID

## 2020-01-01 ENCOUNTER — HOSPITAL ENCOUNTER (OUTPATIENT)
Dept: CARDIOLOGY | Facility: CLINIC | Age: 30
Discharge: HOME | End: 2020-01-10
Attending: INTERNAL MEDICINE
Payer: MEDICAID

## 2020-01-01 ENCOUNTER — TELEPHONE (OUTPATIENT)
Dept: ELECTROPHYSIOLOGY | Facility: CLINIC | Age: 30
End: 2020-01-01

## 2020-01-01 ENCOUNTER — TELEPHONE (OUTPATIENT)
Dept: CARDIOLOGY | Facility: HOSPITAL | Age: 30
End: 2020-01-01

## 2020-01-01 ENCOUNTER — TELEPHONE (OUTPATIENT)
Dept: CARDIOLOGY | Facility: CLINIC | Age: 30
End: 2020-01-01

## 2020-01-01 ENCOUNTER — LAB VISIT (OUTPATIENT)
Dept: SURGERY | Facility: CLINIC | Age: 30
End: 2020-01-01
Payer: MEDICAID

## 2020-01-01 ENCOUNTER — DOCUMENTATION ONLY (OUTPATIENT)
Dept: TRANSPLANT | Facility: HOSPITAL | Age: 30
End: 2020-01-01

## 2020-01-01 ENCOUNTER — HOSPITAL ENCOUNTER (OUTPATIENT)
Dept: RADIOLOGY | Facility: HOSPITAL | Age: 30
Discharge: HOME | End: 2020-06-22
Attending: INTERNAL MEDICINE
Payer: MEDICAID

## 2020-01-01 VITALS
OXYGEN SATURATION: 96 % | TEMPERATURE: 98 F | BODY MASS INDEX: 31.01 KG/M2 | RESPIRATION RATE: 20 BRPM | DIASTOLIC BLOOD PRESSURE: 76 MMHG | HEART RATE: 85 BPM | SYSTOLIC BLOOD PRESSURE: 119 MMHG | HEIGHT: 73 IN | WEIGHT: 234 LBS

## 2020-01-01 VITALS
DIASTOLIC BLOOD PRESSURE: 64 MMHG | DIASTOLIC BLOOD PRESSURE: 81 MMHG | BODY MASS INDEX: 27.83 KG/M2 | HEIGHT: 73 IN | HEART RATE: 84 BPM | HEART RATE: 85 BPM | WEIGHT: 210 LBS | SYSTOLIC BLOOD PRESSURE: 120 MMHG | HEIGHT: 73 IN | WEIGHT: 229.94 LBS | BODY MASS INDEX: 30.47 KG/M2 | SYSTOLIC BLOOD PRESSURE: 118 MMHG

## 2020-01-01 VITALS
HEART RATE: 88 BPM | WEIGHT: 210.56 LBS | HEIGHT: 73 IN | BODY MASS INDEX: 29.16 KG/M2 | WEIGHT: 220 LBS | HEIGHT: 73 IN | DIASTOLIC BLOOD PRESSURE: 80 MMHG | SYSTOLIC BLOOD PRESSURE: 106 MMHG | SYSTOLIC BLOOD PRESSURE: 110 MMHG | DIASTOLIC BLOOD PRESSURE: 67 MMHG | BODY MASS INDEX: 27.91 KG/M2 | HEART RATE: 90 BPM

## 2020-01-01 VITALS
HEIGHT: 73 IN | DIASTOLIC BLOOD PRESSURE: 80 MMHG | SYSTOLIC BLOOD PRESSURE: 120 MMHG | WEIGHT: 229 LBS | BODY MASS INDEX: 30.35 KG/M2 | HEART RATE: 88 BPM

## 2020-01-01 DIAGNOSIS — T86.21 HEART TRANSPLANT FAILURE AND REJECTION: ICD-10-CM

## 2020-01-01 DIAGNOSIS — T86.20 COMPLICATION OF HEART TRANSPLANT, UNSPECIFIED COMPLICATION: ICD-10-CM

## 2020-01-01 DIAGNOSIS — Z94.1 STATUS POST HEART TRANSPLANT: ICD-10-CM

## 2020-01-01 DIAGNOSIS — Z94.1 HISTORY OF HEART TRANSPLANT: Primary | ICD-10-CM

## 2020-01-01 DIAGNOSIS — T86.22 HEART TRANSPLANT FAILURE AND REJECTION: ICD-10-CM

## 2020-01-01 DIAGNOSIS — E78.2 MIXED HYPERLIPIDEMIA: ICD-10-CM

## 2020-01-01 DIAGNOSIS — T86.22 HEART TRANSPLANT FAILURE AND REJECTION: Primary | ICD-10-CM

## 2020-01-01 DIAGNOSIS — T86.21 HEART TRANSPLANT FAILURE AND REJECTION: Primary | ICD-10-CM

## 2020-01-01 DIAGNOSIS — E83.42 HYPOMAGNESEMIA: ICD-10-CM

## 2020-01-01 DIAGNOSIS — I50.42 CHRONIC COMBINED SYSTOLIC AND DIASTOLIC HEART FAILURE: ICD-10-CM

## 2020-01-01 DIAGNOSIS — I25.811 ATHEROSCLEROSIS OF NATIVE CORONARY ARTERY OF TRANSPLANTED HEART WITHOUT ANGINA PECTORIS: ICD-10-CM

## 2020-01-01 DIAGNOSIS — R06.02 SHORTNESS OF BREATH: ICD-10-CM

## 2020-01-01 DIAGNOSIS — Z94.1 HISTORY OF HEART TRANSPLANT: ICD-10-CM

## 2020-01-01 DIAGNOSIS — Z79.899 ENCOUNTER FOR LONG-TERM (CURRENT) USE OF MEDICATIONS: ICD-10-CM

## 2020-01-01 DIAGNOSIS — T86.21 CHRONIC REJECTION OF HEART TRANSPLANT: ICD-10-CM

## 2020-01-01 DIAGNOSIS — E78.5 DYSLIPIDEMIA: ICD-10-CM

## 2020-01-01 DIAGNOSIS — I83.001 VARICOSE VEINS OF LOWER EXTREMITY WITH ULCER OF THIGH LIMITED TO BREAKDOWN OF SKIN, UNSPECIFIED LATERALITY: ICD-10-CM

## 2020-01-01 DIAGNOSIS — Z94.1 STATUS POST HEART TRANSPLANT: Primary | ICD-10-CM

## 2020-01-01 DIAGNOSIS — R52 PAIN: Primary | ICD-10-CM

## 2020-01-01 DIAGNOSIS — I87.2 VENOUS INSUFFICIENCY: Primary | ICD-10-CM

## 2020-01-01 DIAGNOSIS — Z94.1 TRANSPLANTED HEART: ICD-10-CM

## 2020-01-01 DIAGNOSIS — I10 ESSENTIAL HYPERTENSION: ICD-10-CM

## 2020-01-01 DIAGNOSIS — L97.101 VARICOSE VEINS OF LOWER EXTREMITY WITH ULCER OF THIGH LIMITED TO BREAKDOWN OF SKIN, UNSPECIFIED LATERALITY: ICD-10-CM

## 2020-01-01 LAB
ABO + RH BLD: NORMAL
ASCENDING AORTA: 2.94 CM
ASCENDING AORTA: 3.08 CM
AV INDEX (PROSTH): 0.68
AV INDEX (PROSTH): 0.71
AV INDEX (PROSTH): 0.86
AV MEAN GRADIENT: 2 MMHG
AV MEAN GRADIENT: 3 MMHG
AV MEAN GRADIENT: 3 MMHG
AV PEAK GRADIENT: 2 MMHG
AV PEAK GRADIENT: 5 MMHG
AV PEAK GRADIENT: 6 MMHG
AV VALVE AREA: 2.47 CM2
AV VALVE AREA: 2.82 CM2
AV VALVE AREA: 3.28 CM2
AV VELOCITY RATIO: 0.69
AV VELOCITY RATIO: 0.72
AV VELOCITY RATIO: 0.8
BLD GP AB SCN CELLS X3 SERPL QL: NORMAL
BSA FOR ECHO PROCEDURE: 2.21 M2
BSA FOR ECHO PROCEDURE: 2.27 M2
BSA FOR ECHO PROCEDURE: 2.31 M2
CV ECHO LV RWT: 0.32 CM
CV ECHO LV RWT: 0.33 CM
CV ECHO LV RWT: 0.34 CM
DOP CALC AO PEAK VEL: 0.76 M/S
DOP CALC AO PEAK VEL: 1.08 M/S
DOP CALC AO PEAK VEL: 1.19 M/S
DOP CALC AO VTI: 12.83 CM
DOP CALC AO VTI: 18.17 CM
DOP CALC AO VTI: 19.86 CM
DOP CALC LVOT AREA: 3.5 CM2
DOP CALC LVOT AREA: 3.8 CM2
DOP CALC LVOT AREA: 4.2 CM2
DOP CALC LVOT DIAMETER: 2.1 CM
DOP CALC LVOT DIAMETER: 2.2 CM
DOP CALC LVOT DIAMETER: 2.3 CM
DOP CALC LVOT PEAK VEL: 0.61 M/S
DOP CALC LVOT PEAK VEL: 0.78 M/S
DOP CALC LVOT PEAK VEL: 0.82 M/S
DOP CALC LVOT STROKE VOLUME: 42.02 CM3
DOP CALC LVOT STROKE VOLUME: 48.99 CM3
DOP CALC LVOT STROKE VOLUME: 51.16 CM3
DOP CALCLVOT PEAK VEL VTI: 11.06 CM
DOP CALCLVOT PEAK VEL VTI: 12.32 CM
DOP CALCLVOT PEAK VEL VTI: 14.15 CM
E WAVE DECELERATION TIME: 140.5 MSEC
E WAVE DECELERATION TIME: 153.51 MSEC
E/A RATIO: 1.1
E/A RATIO: 3.86
E/E' RATIO: 7.23 M/S
E/E' RATIO: 7.5 M/S
E/E' RATIO: 9.3 M/S
ECHO LV POSTERIOR WALL: 0.75 CM (ref 0.6–1.1)
ECHO LV POSTERIOR WALL: 0.77 CM (ref 0.6–1.1)
ECHO LV POSTERIOR WALL: 0.82 CM (ref 0.6–1.1)
FRACTIONAL SHORTENING: 19 % (ref 28–44)
FRACTIONAL SHORTENING: 19 % (ref 28–44)
FRACTIONAL SHORTENING: 26 % (ref 28–44)
INTERVENTRICULAR SEPTUM: 0.77 CM (ref 0.6–1.1)
INTERVENTRICULAR SEPTUM: 0.84 CM (ref 0.6–1.1)
INTERVENTRICULAR SEPTUM: 0.89 CM (ref 0.6–1.1)
IVRT: 45.67 MSEC
LEFT INTERNAL DIMENSION IN SYSTOLE: 3.6 CM (ref 2.1–4)
LEFT INTERNAL DIMENSION IN SYSTOLE: 3.66 CM (ref 2.1–4)
LEFT INTERNAL DIMENSION IN SYSTOLE: 3.94 CM (ref 2.1–4)
LEFT VENTRICLE DIASTOLIC VOLUME INDEX: 40.79 ML/M2
LEFT VENTRICLE DIASTOLIC VOLUME INDEX: 49.27 ML/M2
LEFT VENTRICLE DIASTOLIC VOLUME INDEX: 49.95 ML/M2
LEFT VENTRICLE DIASTOLIC VOLUME: 110.4 ML
LEFT VENTRICLE DIASTOLIC VOLUME: 113.86 ML
LEFT VENTRICLE DIASTOLIC VOLUME: 89.6 ML
LEFT VENTRICLE MASS INDEX: 47 G/M2
LEFT VENTRICLE MASS INDEX: 60 G/M2
LEFT VENTRICLE MASS INDEX: 61 G/M2
LEFT VENTRICLE SYSTOLIC VOLUME INDEX: 24.7 ML/M2
LEFT VENTRICLE SYSTOLIC VOLUME INDEX: 24.8 ML/M2
LEFT VENTRICLE SYSTOLIC VOLUME INDEX: 30.2 ML/M2
LEFT VENTRICLE SYSTOLIC VOLUME: 54.36 ML
LEFT VENTRICLE SYSTOLIC VOLUME: 56.56 ML
LEFT VENTRICLE SYSTOLIC VOLUME: 67.7 ML
LEFT VENTRICULAR INTERNAL DIMENSION IN DIASTOLE: 4.44 CM (ref 3.5–6)
LEFT VENTRICULAR INTERNAL DIMENSION IN DIASTOLE: 4.85 CM (ref 3.5–6)
LEFT VENTRICULAR INTERNAL DIMENSION IN DIASTOLE: 4.92 CM (ref 3.5–6)
LEFT VENTRICULAR MASS: 103.92 G
LEFT VENTRICULAR MASS: 135.23 G
LEFT VENTRICULAR MASS: 138.54 G
LV LATERAL E/E' RATIO: 11.89 M/S
LV LATERAL E/E' RATIO: 8 M/S
LV LATERAL E/E' RATIO: 9.64 M/S
LV SEPTAL E/E' RATIO: 6.14 M/S
LV SEPTAL E/E' RATIO: 6.59 M/S
LV SEPTAL E/E' RATIO: 7.64 M/S
MV PEAK A VEL: 0.29 M/S
MV PEAK A VEL: 0.97 M/S
MV PEAK E VEL: 1.07 M/S
MV PEAK E VEL: 1.12 M/S
MV PEAK E VEL: 1.35 M/S
MV STENOSIS PRESSURE HALF TIME: 40.74 MS
MV VALVE AREA P 1/2 METHOD: 5.4 CM2
PISA TR MAX VEL: 2.2 M/S
PISA TR MAX VEL: 2.29 M/S
PISA TR MAX VEL: 2.94 M/S
POC ACTIVATED CLOTTING TIME K: 158 SEC (ref 74–137)
RA PRESSURE: 15 MMHG
RA PRESSURE: 3 MMHG
RA PRESSURE: 8 MMHG
RETIRED EF AND QEF - SEE NOTES: 49 %
RIGHT VENTRICULAR END-DIASTOLIC DIMENSION: 3.71 CM
RIGHT VENTRICULAR END-DIASTOLIC DIMENSION: 4.08 CM
RIGHT VENTRICULAR END-DIASTOLIC DIMENSION: 4.2 CM
RV TISSUE DOPPLER FREE WALL SYSTOLIC VELOCITY 1 (APICAL 4 CHAMBER VIEW): 3.77 CM/S
RV TISSUE DOPPLER FREE WALL SYSTOLIC VELOCITY 1 (APICAL 4 CHAMBER VIEW): 4.83 CM/S
SAMPLE: ABNORMAL
SARS-COV-2 RNA RESP QL NAA+PROBE: NOT DETECTED
SINUS: 2.94 CM
SINUS: 3.01 CM
SINUS: 3.02 CM
STJ: 2.59 CM
STJ: 2.69 CM
STJ: 2.85 CM
TDI LATERAL: 0.09 M/S
TDI LATERAL: 0.14 M/S
TDI LATERAL: 0.14 M/S
TDI SEPTAL: 0.14 M/S
TDI SEPTAL: 0.17 M/S
TDI SEPTAL: 0.22 M/S
TDI: 0.12 M/S
TDI: 0.16 M/S
TDI: 0.18 M/S
TR MAX PG: 19 MMHG
TR MAX PG: 21 MMHG
TR MAX PG: 35 MMHG
TRICUSPID ANNULAR PLANE SYSTOLIC EXCURSION: 0.8 CM
TRICUSPID ANNULAR PLANE SYSTOLIC EXCURSION: 0.85 CM
TRICUSPID ANNULAR PLANE SYSTOLIC EXCURSION: 0.94 CM
TV REST PULMONARY ARTERY PRESSURE: 24 MMHG
TV REST PULMONARY ARTERY PRESSURE: 34 MMHG
TV REST PULMONARY ARTERY PRESSURE: 43 MMHG

## 2020-01-01 PROCEDURE — C1894 INTRO/SHEATH, NON-LASER: HCPCS | Performed by: INTERNAL MEDICINE

## 2020-01-01 PROCEDURE — 99215 OFFICE O/P EST HI 40 MIN: CPT | Mod: S$PBB,,, | Performed by: INTERNAL MEDICINE

## 2020-01-01 PROCEDURE — 99215 PR OFFICE/OUTPT VISIT, EST, LEVL V, 40-54 MIN: ICD-10-PCS | Mod: S$PBB,,, | Performed by: INTERNAL MEDICINE

## 2020-01-01 PROCEDURE — C1769 GUIDE WIRE: HCPCS | Performed by: INTERNAL MEDICINE

## 2020-01-01 PROCEDURE — 93010 EKG 12-LEAD: ICD-10-PCS | Mod: ,,, | Performed by: INTERNAL MEDICINE

## 2020-01-01 PROCEDURE — 93460 PR CATH PLACE/CORON ANGIO, IMG SUPER/INTERP,R&L HRT CATH, L HRT VENTRIC: ICD-10-PCS | Mod: 26,,, | Performed by: INTERNAL MEDICINE

## 2020-01-01 PROCEDURE — U0003 INFECTIOUS AGENT DETECTION BY NUCLEIC ACID (DNA OR RNA); SEVERE ACUTE RESPIRATORY SYNDROME CORONAVIRUS 2 (SARS-COV-2) (CORONAVIRUS DISEASE [COVID-19]), AMPLIFIED PROBE TECHNIQUE, MAKING USE OF HIGH THROUGHPUT TECHNOLOGIES AS DESCRIBED BY CMS-2020-01-R: HCPCS

## 2020-01-01 PROCEDURE — 86850 RBC ANTIBODY SCREEN: CPT

## 2020-01-01 PROCEDURE — 71046 X-RAY EXAM CHEST 2 VIEWS: CPT | Mod: 26,,, | Performed by: RADIOLOGY

## 2020-01-01 PROCEDURE — 93306 TTE W/DOPPLER COMPLETE: CPT

## 2020-01-01 PROCEDURE — 93306 TRANSTHORACIC ECHO (TTE) COMPLETE (CUPID ONLY): ICD-10-PCS | Mod: 26,,, | Performed by: INTERNAL MEDICINE

## 2020-01-01 PROCEDURE — 93005 ELECTROCARDIOGRAM TRACING: CPT | Mod: 59

## 2020-01-01 PROCEDURE — 25000003 PHARM REV CODE 250: Performed by: INTERNAL MEDICINE

## 2020-01-01 PROCEDURE — 93005 ELECTROCARDIOGRAM TRACING: CPT

## 2020-01-01 PROCEDURE — 99213 OFFICE O/P EST LOW 20 MIN: CPT | Mod: 95,,, | Performed by: INTERNAL MEDICINE

## 2020-01-01 PROCEDURE — 99213 PR OFFICE/OUTPT VISIT, EST, LEVL III, 20-29 MIN: ICD-10-PCS | Mod: 95,,, | Performed by: INTERNAL MEDICINE

## 2020-01-01 PROCEDURE — 99152 MOD SED SAME PHYS/QHP 5/>YRS: CPT | Performed by: INTERNAL MEDICINE

## 2020-01-01 PROCEDURE — 63600175 PHARM REV CODE 636 W HCPCS

## 2020-01-01 PROCEDURE — C1751 CATH, INF, PER/CENT/MIDLINE: HCPCS | Performed by: INTERNAL MEDICINE

## 2020-01-01 PROCEDURE — 92978 PR IVUS, CORONARY, 1ST VESSEL: ICD-10-PCS | Mod: 26,LD,, | Performed by: INTERNAL MEDICINE

## 2020-01-01 PROCEDURE — 99999 PR PBB SHADOW E&M-EST. PATIENT-LVL III: CPT | Mod: PBBFAC,,, | Performed by: INTERNAL MEDICINE

## 2020-01-01 PROCEDURE — 99153 MOD SED SAME PHYS/QHP EA: CPT | Performed by: INTERNAL MEDICINE

## 2020-01-01 PROCEDURE — 63600175 PHARM REV CODE 636 W HCPCS: Performed by: INTERNAL MEDICINE

## 2020-01-01 PROCEDURE — 93306 TTE W/DOPPLER COMPLETE: CPT | Mod: PBBFAC | Performed by: INTERNAL MEDICINE

## 2020-01-01 PROCEDURE — 93010 ELECTROCARDIOGRAM REPORT: CPT | Mod: 77,,, | Performed by: INTERNAL MEDICINE

## 2020-01-01 PROCEDURE — 92978 ENDOLUMINL IVUS OCT C 1ST: CPT | Mod: LD | Performed by: INTERNAL MEDICINE

## 2020-01-01 PROCEDURE — 92978 ENDOLUMINL IVUS OCT C 1ST: CPT | Mod: 26,LD,, | Performed by: INTERNAL MEDICINE

## 2020-01-01 PROCEDURE — 85347 COAGULATION TIME ACTIVATED: CPT | Performed by: INTERNAL MEDICINE

## 2020-01-01 PROCEDURE — 99999 PR PBB SHADOW E&M-EST. PATIENT-LVL III: ICD-10-PCS | Mod: PBBFAC,,, | Performed by: INTERNAL MEDICINE

## 2020-01-01 PROCEDURE — 99213 OFFICE O/P EST LOW 20 MIN: CPT | Mod: PBBFAC,25 | Performed by: INTERNAL MEDICINE

## 2020-01-01 PROCEDURE — 93010 EKG 12-LEAD: ICD-10-PCS | Mod: 77,,, | Performed by: INTERNAL MEDICINE

## 2020-01-01 PROCEDURE — 99152 PR MOD CONSCIOUS SEDATION, SAME PHYS, 5+ YRS, FIRST 15 MIN: ICD-10-PCS | Mod: ,,, | Performed by: INTERNAL MEDICINE

## 2020-01-01 PROCEDURE — 71046 XR CHEST PA AND LATERAL: ICD-10-PCS | Mod: 26,,, | Performed by: RADIOLOGY

## 2020-01-01 PROCEDURE — C1887 CATHETER, GUIDING: HCPCS | Performed by: INTERNAL MEDICINE

## 2020-01-01 PROCEDURE — 93306 TTE W/DOPPLER COMPLETE: CPT | Mod: 26,,, | Performed by: INTERNAL MEDICINE

## 2020-01-01 PROCEDURE — 93010 ELECTROCARDIOGRAM REPORT: CPT | Mod: ,,, | Performed by: INTERNAL MEDICINE

## 2020-01-01 PROCEDURE — 93460 R&L HRT ART/VENTRICLE ANGIO: CPT | Performed by: INTERNAL MEDICINE

## 2020-01-01 PROCEDURE — 27201423 OPTIME MED/SURG SUP & DEVICES STERILE SUPPLY: Performed by: INTERNAL MEDICINE

## 2020-01-01 PROCEDURE — C1753 CATH, INTRAVAS ULTRASOUND: HCPCS | Performed by: INTERNAL MEDICINE

## 2020-01-01 PROCEDURE — 93306 TRANSTHORACIC ECHO (TTE) COMPLETE (CUPID ONLY): ICD-10-PCS | Mod: 26,S$PBB,, | Performed by: INTERNAL MEDICINE

## 2020-01-01 PROCEDURE — 71046 X-RAY EXAM CHEST 2 VIEWS: CPT | Mod: TC,FY

## 2020-01-01 PROCEDURE — 99152 MOD SED SAME PHYS/QHP 5/>YRS: CPT | Mod: ,,, | Performed by: INTERNAL MEDICINE

## 2020-01-01 PROCEDURE — 93460 R&L HRT ART/VENTRICLE ANGIO: CPT | Mod: 26,,, | Performed by: INTERNAL MEDICINE

## 2020-01-01 RX ORDER — MYCOPHENOLATE MOFETIL 250 MG/1
1000 CAPSULE ORAL 2 TIMES DAILY
Qty: 240 CAPSULE | Refills: 5 | Status: SHIPPED | OUTPATIENT
Start: 2020-01-01 | End: 2020-01-01 | Stop reason: SDUPTHER

## 2020-01-01 RX ORDER — LANOLIN ALCOHOL/MO/W.PET/CERES
400 CREAM (GRAM) TOPICAL 2 TIMES DAILY
Qty: 60 TABLET | Refills: 11 | COMMUNITY
Start: 2020-01-01 | End: 2020-01-01 | Stop reason: SDUPTHER

## 2020-01-01 RX ORDER — SODIUM CHLORIDE 9 MG/ML
3 INJECTION, SOLUTION INTRAVENOUS CONTINUOUS
Status: DISCONTINUED | OUTPATIENT
Start: 2020-01-01 | End: 2020-01-01

## 2020-01-01 RX ORDER — CLOPIDOGREL BISULFATE 75 MG/1
75 TABLET ORAL DAILY
Qty: 30 TABLET | Refills: 5 | Status: SHIPPED | OUTPATIENT
Start: 2020-01-01

## 2020-01-01 RX ORDER — PREDNISONE 10 MG/1
10 TABLET ORAL DAILY
Qty: 30 TABLET | Refills: 11 | Status: SHIPPED | OUTPATIENT
Start: 2020-01-01 | End: 2020-01-01 | Stop reason: SDUPTHER

## 2020-01-01 RX ORDER — FUROSEMIDE 80 MG/1
80 TABLET ORAL 2 TIMES DAILY
Qty: 60 TABLET | Refills: 11 | Status: SHIPPED | OUTPATIENT
Start: 2020-01-01 | End: 2020-01-01 | Stop reason: SDUPTHER

## 2020-01-01 RX ORDER — TACROLIMUS 1 MG/1
2 CAPSULE ORAL EVERY 12 HOURS
Qty: 120 CAPSULE | Refills: 11 | Status: SHIPPED | OUTPATIENT
Start: 2020-01-01 | End: 2020-01-01 | Stop reason: SDUPTHER

## 2020-01-01 RX ORDER — TACROLIMUS 1 MG/1
2 CAPSULE ORAL EVERY 12 HOURS
Qty: 120 CAPSULE | Refills: 11 | Status: SHIPPED | OUTPATIENT
Start: 2020-01-01 | End: 2021-08-25

## 2020-01-01 RX ORDER — ATORVASTATIN CALCIUM 80 MG/1
80 TABLET, FILM COATED ORAL NIGHTLY
Qty: 30 TABLET | Refills: 5 | Status: SHIPPED | OUTPATIENT
Start: 2020-01-01 | End: 2020-01-01 | Stop reason: SDUPTHER

## 2020-01-01 RX ORDER — ASPIRIN 81 MG/1
81 TABLET ORAL DAILY
Qty: 30 TABLET | Refills: 11 | Status: SHIPPED | OUTPATIENT
Start: 2020-01-01 | End: 2020-01-01 | Stop reason: SDUPTHER

## 2020-01-01 RX ORDER — CLOPIDOGREL BISULFATE 75 MG/1
75 TABLET ORAL DAILY
Qty: 30 TABLET | Refills: 5 | Status: SHIPPED | OUTPATIENT
Start: 2020-01-01 | End: 2020-01-01 | Stop reason: SDUPTHER

## 2020-01-01 RX ORDER — LIDOCAINE HYDROCHLORIDE 20 MG/ML
INJECTION, SOLUTION EPIDURAL; INFILTRATION; INTRACAUDAL; PERINEURAL
Status: DISCONTINUED | OUTPATIENT
Start: 2020-01-01 | End: 2020-01-01 | Stop reason: HOSPADM

## 2020-01-01 RX ORDER — ATORVASTATIN CALCIUM 80 MG/1
80 TABLET, FILM COATED ORAL NIGHTLY
Qty: 30 TABLET | Refills: 11 | Status: SHIPPED | OUTPATIENT
Start: 2020-01-01 | End: 2020-01-01 | Stop reason: SDUPTHER

## 2020-01-01 RX ORDER — LISINOPRIL 10 MG/1
10 TABLET ORAL DAILY
Qty: 30 TABLET | Refills: 5 | Status: SHIPPED | OUTPATIENT
Start: 2020-01-01 | End: 2020-01-01 | Stop reason: SDUPTHER

## 2020-01-01 RX ORDER — MIDAZOLAM HYDROCHLORIDE 1 MG/ML
INJECTION, SOLUTION INTRAMUSCULAR; INTRAVENOUS
Status: DISCONTINUED | OUTPATIENT
Start: 2020-01-01 | End: 2020-01-01 | Stop reason: HOSPADM

## 2020-01-01 RX ORDER — DIPHENHYDRAMINE HYDROCHLORIDE 50 MG/ML
INJECTION INTRAMUSCULAR; INTRAVENOUS
Status: DISCONTINUED | OUTPATIENT
Start: 2020-01-01 | End: 2020-01-01 | Stop reason: HOSPADM

## 2020-01-01 RX ORDER — CLOPIDOGREL BISULFATE 75 MG/1
75 TABLET ORAL ONCE
Status: COMPLETED | OUTPATIENT
Start: 2020-01-01 | End: 2020-01-01

## 2020-01-01 RX ORDER — HEPARIN SODIUM 1000 [USP'U]/ML
INJECTION, SOLUTION INTRAVENOUS; SUBCUTANEOUS
Status: DISCONTINUED | OUTPATIENT
Start: 2020-01-01 | End: 2020-01-01 | Stop reason: HOSPADM

## 2020-01-01 RX ORDER — DIPHENHYDRAMINE HYDROCHLORIDE 50 MG/ML
INJECTION INTRAMUSCULAR; INTRAVENOUS
Status: COMPLETED
Start: 2020-01-01 | End: 2020-01-01

## 2020-01-01 RX ORDER — ASPIRIN 81 MG/1
81 TABLET ORAL DAILY
Qty: 30 TABLET | Refills: 11 | Status: SHIPPED | OUTPATIENT
Start: 2020-01-01 | End: 2021-07-06

## 2020-01-01 RX ORDER — HEPARIN SODIUM 200 [USP'U]/100ML
INJECTION, SOLUTION INTRAVENOUS
Status: DISCONTINUED | OUTPATIENT
Start: 2020-01-01 | End: 2020-01-01 | Stop reason: HOSPADM

## 2020-01-01 RX ORDER — PREDNISONE 10 MG/1
10 TABLET ORAL DAILY
Qty: 30 TABLET | Refills: 5 | Status: SHIPPED | OUTPATIENT
Start: 2020-01-01 | End: 2020-01-01 | Stop reason: SDUPTHER

## 2020-01-01 RX ORDER — ZOLPIDEM TARTRATE 5 MG/1
5 TABLET ORAL NIGHTLY PRN
Qty: 30 TABLET | Refills: 1 | Status: SHIPPED | OUTPATIENT
Start: 2020-01-01 | End: 2020-01-01

## 2020-01-01 RX ORDER — MYCOPHENOLATE MOFETIL 250 MG/1
1000 CAPSULE ORAL 2 TIMES DAILY
Qty: 240 CAPSULE | Refills: 5 | Status: CANCELLED | OUTPATIENT
Start: 2020-01-01 | End: 2021-06-22

## 2020-01-01 RX ORDER — FENTANYL CITRATE 50 UG/ML
INJECTION, SOLUTION INTRAMUSCULAR; INTRAVENOUS
Status: DISCONTINUED | OUTPATIENT
Start: 2020-01-01 | End: 2020-01-01 | Stop reason: HOSPADM

## 2020-01-01 RX ORDER — LANOLIN ALCOHOL/MO/W.PET/CERES
400 CREAM (GRAM) TOPICAL 2 TIMES DAILY
Refills: 0 | COMMUNITY
Start: 2020-01-01 | End: 2020-01-01 | Stop reason: SDUPTHER

## 2020-01-01 RX ORDER — PREDNISONE 10 MG/1
10 TABLET ORAL DAILY
Qty: 30 TABLET | Refills: 11 | Status: SHIPPED | OUTPATIENT
Start: 2020-01-01 | End: 2020-01-01

## 2020-01-01 RX ORDER — NITROGLYCERIN 5 MG/ML
INJECTION, SOLUTION INTRAVENOUS
Status: DISCONTINUED | OUTPATIENT
Start: 2020-01-01 | End: 2020-01-01 | Stop reason: HOSPADM

## 2020-01-01 RX ORDER — HYDROCODONE BITARTRATE AND ACETAMINOPHEN 5; 325 MG/1; MG/1
1 TABLET ORAL ONCE
Status: COMPLETED | OUTPATIENT
Start: 2020-01-01 | End: 2020-01-01

## 2020-01-01 RX ORDER — SULFAMETHOXAZOLE AND TRIMETHOPRIM 400; 80 MG/1; MG/1
1 TABLET ORAL DAILY
Qty: 30 TABLET | Refills: 5 | Status: SHIPPED | OUTPATIENT
Start: 2020-01-01 | End: 2020-01-01 | Stop reason: SDUPTHER

## 2020-01-01 RX ORDER — LANOLIN ALCOHOL/MO/W.PET/CERES
400 CREAM (GRAM) TOPICAL 2 TIMES DAILY
Qty: 60 TABLET | Refills: 11 | Status: CANCELLED | COMMUNITY
Start: 2020-01-01

## 2020-01-01 RX ORDER — ATORVASTATIN CALCIUM 80 MG/1
80 TABLET, FILM COATED ORAL NIGHTLY
Qty: 30 TABLET | Refills: 11 | Status: SHIPPED | OUTPATIENT
Start: 2020-01-01 | End: 2021-10-04

## 2020-01-01 RX ORDER — ZOLPIDEM TARTRATE 5 MG/1
5 TABLET ORAL NIGHTLY PRN
Qty: 30 TABLET | Refills: 1 | Status: SHIPPED | OUTPATIENT
Start: 2020-01-01 | End: 2020-01-01 | Stop reason: SDUPTHER

## 2020-01-01 RX ORDER — SULFAMETHOXAZOLE AND TRIMETHOPRIM 400; 80 MG/1; MG/1
1 TABLET ORAL DAILY
Qty: 30 TABLET | Refills: 6 | Status: SHIPPED | OUTPATIENT
Start: 2020-01-01 | End: 2020-01-01

## 2020-01-01 RX ORDER — DIPHENHYDRAMINE HCL 50 MG
50 CAPSULE ORAL ONCE
Status: DISCONTINUED | OUTPATIENT
Start: 2020-01-01 | End: 2020-01-01

## 2020-01-01 RX ORDER — ZOLPIDEM TARTRATE 5 MG/1
5 TABLET ORAL NIGHTLY PRN
Qty: 30 TABLET | Refills: 0 | Status: CANCELLED | OUTPATIENT
Start: 2020-01-01 | End: 2020-01-01

## 2020-01-01 RX ORDER — LANOLIN ALCOHOL/MO/W.PET/CERES
400 CREAM (GRAM) TOPICAL 2 TIMES DAILY
Qty: 60 TABLET | Refills: 11 | COMMUNITY
Start: 2020-01-01

## 2020-01-01 RX ORDER — FUROSEMIDE 80 MG/1
80 TABLET ORAL 2 TIMES DAILY
Qty: 60 TABLET | Refills: 11 | Status: SHIPPED | OUTPATIENT
Start: 2020-01-01 | End: 2021-10-04

## 2020-01-01 RX ORDER — LISINOPRIL 10 MG/1
10 TABLET ORAL DAILY
Qty: 30 TABLET | Refills: 5 | Status: SHIPPED | OUTPATIENT
Start: 2020-01-01 | End: 2021-10-04

## 2020-01-01 RX ORDER — DIPHENHYDRAMINE HYDROCHLORIDE 50 MG/ML
25 INJECTION INTRAMUSCULAR; INTRAVENOUS ONCE
Status: COMPLETED | OUTPATIENT
Start: 2020-01-01 | End: 2020-01-01

## 2020-01-01 RX ORDER — SULFAMETHOXAZOLE AND TRIMETHOPRIM 400; 80 MG/1; MG/1
1 TABLET ORAL DAILY
Qty: 30 TABLET | Refills: 5 | Status: SHIPPED | OUTPATIENT
Start: 2020-01-01 | End: 2020-01-01

## 2020-01-01 RX ORDER — METHYLPREDNISOLONE SOD SUCC 125 MG
125 VIAL (EA) INJECTION ONCE
Status: COMPLETED | OUTPATIENT
Start: 2020-01-01 | End: 2020-01-01

## 2020-01-01 RX ADMIN — DIPHENHYDRAMINE HYDROCHLORIDE 25 MG: 50 INJECTION INTRAMUSCULAR; INTRAVENOUS at 08:07

## 2020-01-01 RX ADMIN — CLOPIDOGREL 75 MG: 75 TABLET, FILM COATED ORAL at 08:07

## 2020-01-01 RX ADMIN — HYDROCODONE BITARTRATE AND ACETAMINOPHEN 1 TABLET: 5; 325 TABLET ORAL at 11:07

## 2020-01-01 RX ADMIN — SODIUM CHLORIDE 3 ML/KG/HR: 0.9 INJECTION, SOLUTION INTRAVENOUS at 08:07

## 2020-01-01 RX ADMIN — DIPHENHYDRAMINE HYDROCHLORIDE 25 MG: 50 INJECTION, SOLUTION INTRAMUSCULAR; INTRAVENOUS at 08:07

## 2020-01-01 RX ADMIN — METHYLPREDNISOLONE SODIUM SUCCINATE 125 MG: 125 INJECTION, POWDER, FOR SOLUTION INTRAMUSCULAR; INTRAVENOUS at 08:07

## 2020-01-10 NOTE — PROGRESS NOTES
Subjective:   Mr. Smiley is a 29 y.o. year old white male who received a  heart transplant on 3/23/10.      CMV status:   Donor:   Recipient: +    Congestive Heart Failure   Pertinent negatives include no abdominal pain, chest pain, chills, coughing, diaphoresis, fever or weakness.       29 yo male, new to our service, s/p OHTx in 2010 at [facility] presented to the ED with ~ 2 week h/o worsening CORTEZ and JUN that extends up to scrotum. He relates that he cut ties with the [facility] program ~ 2-3 years ago after undergoing coronary PCI's to his transplanted heart. Was doing well until April when he was hospitalized at [facility] for volume overload and was diuresed. Was seen by Dr. Holliday 7/6/18. TTE in February showed LVEF 53%, RV mod enlarge, LA markedly dilated and mild to mod MR. TTE showed a decline in LVEF to 25-30% and was admitted here for management. He admited that he takes his meds sporadically. Supposed to be on Prograf 1 mg bid. Also supposed to take Plavix for his PCI's, and says he takes Lasix 80 mg from time to time.        Prograf 2/2 MMF 1000 mg bid and prednisone 10 mg daily    Doing well    EF 50%            CONCLUSIONS     1 - Moderately depressed left ventricular systolic function (EF 35-40%).     2 - Post-cardiac transplantation study.     3 - Mildly depressed right ventricular systolic function .     4 - Pulmonary hypertension. The estimated PA systolic pressure is 47 mmHg.     5 - Mitral annulus inversus, raises concern for constriction, shortned deceleration time, however no respiratory variation obtained..  LVEDP=40mmHg.          RHC performed via left IJV.    PCWP=40mmHg.    PA=62/40(5)mmHg.    RVSP=62mmHg.    Mean RA=30mmHg.    Suman CO=4.85.    Suman CI=2.24.     Angiographic Results     Diagnostic:          Patient has a right dominant coronary artery.        - Left Main Coronary Artery:             The LM has a 10% stenosis. There is DENIZ 3 flow. The remaining portion of the vessel has  luminal irregularities (10).     - Left Anterior Descending Artery:             The proximal LAD has luminal irregularities. There is DENIZ 3 flow.             The mid LAD has a 90% stenosis. There is DENIZ 3 flow. The remaining portion of the vessel has luminal irregularities (10). The 90% mid LAD stenosis is ISR.             The distal LAD has chronic total occlusion. There is DENIZ 0 flow.     - D1:             The D1 has luminal irregularities. There is DENIZ 3 flow.     - D2:             The D2 has luminal irregularities. There is DENIZ 3 flow.     - Left Circumflex Artery:             The proximal LCX has luminal irregularities. There is DENIZ 3 flow.             The distal LCX has luminal irregularities. There is DENIZ 3 flow. The remaining portion of the vessel is of small caliber.     - OM1:             The OM1 has luminal irregularities. There is DENIZ 3 flow. The remaining portion of the vessel is of small caliber.     - OM2:             The OM2 has a 99% stenosis. There is DENIZ 3 flow. The remaining portion of the vessel is of small caliber.     - OM3:             The OM3 has a 60% stenosis. There is DENIZ 3 flow. The remaining portion of the vessel has luminal irregularities (10).     - Right Coronary Artery:             The proximal RCA has a 10% stenosis. There is DENIZ 3 flow. The remaining portion of the vessel has luminal irregularities (10).             The mid RCA has a 30% stenosis. There is DENIZ 3 flow. The remaining portion of the vessel has luminal irregularities (10).             The distal RCA is normal. There is DENIZ 3 flow.     - Posterior Lateral Branch:             The proximal PLB has luminal irregularities. There is DENIZ 3 flow.             The distal PLB is diffusely diseased (75). There is DENIZ 3 flow.     - Posterior Descending Artery:             The PDA has luminal irregularities. There is DENIZ 3 flow.    CAV as detailed below including 90% mid LAD ISR.    Occluded right EIV.    Occluded  "left CFV.    Occluded right IJV.        Hospital Course: He was started on IV Lasix for diureses at admit. In addition, INV CARDS was consulted and LHC/RHC was performed which revealed cccluded right EIV, left CFV, and right IJV. 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 PCWP = 40 mmHg, Mean RA = 30 mmHg, and Suman CI = 2.24. Unable to get Bx. DSA was sent which revealed CW2(56497),CW7(2306). He was administered a solumedrol 1gm bolus x 3 doses, followed by prednisone 20mg orally daily. Prednisone was decreased to 10mg orally daily on discharge. FK was increased to 2mg orally twice daily for goal of 5-10. MMF was started at 500mg orally daily. He reported being on rapamune at some point, but was unable to afford the cost. This was not restarted, as it is unclear how long he has been off of it. MMF was initiated with plan to increase dose to 1500mg as tolerated. OI prophylaxis includes nystatin for 1 month and bactrim SS for 6 months. Follow up potassium level needed. Valcyte was not started as CMV IgG was positive, and affordability. He will follow with [facility] in the future and will come back to clinic in 1-2 weeks with echo prior      Review of Systems   Constitution: Negative for chills, decreased appetite, diaphoresis, fever, malaise/fatigue, night sweats, weight gain and weight loss.   Cardiovascular: Negative for chest pain, claudication, cyanosis, dyspnea on exertion, irregular heartbeat, leg swelling, near-syncope, orthopnea and palpitations.   Respiratory: Negative for cough, hemoptysis, shortness of breath, sleep disturbances due to breathing, snoring, sputum production and wheezing.    Gastrointestinal: Negative for abdominal pain and diarrhea.   Neurological: Negative for weakness.       Objective:   Blood pressure 106/67, pulse 88, height 6' 1" (1.854 m), weight 95.5 kg (210 lb 8.6 oz).body mass index is 27.78 kg/m².    Physical Exam   Constitutional: He is oriented to person, place, and time. He appears " well-developed and well-nourished.   HENT:   Head: Normocephalic and atraumatic.   Eyes: Pupils are equal, round, and reactive to light. Conjunctivae and EOM are normal.   Neck: Normal range of motion. Neck supple. No JVD present.   Cardiovascular: Normal rate and regular rhythm. Exam reveals no gallop and no friction rub.   No murmur heard.  Pulmonary/Chest: Breath sounds normal. No respiratory distress. He has no wheezes. He has no rales. He exhibits no tenderness.   Abdominal: Soft. Bowel sounds are normal. He exhibits no distension and no mass. There is no hepatosplenomegaly, splenomegaly or hepatomegaly. There is no tenderness. There is no rebound, no guarding and no CVA tenderness.   No hepatoslenomegaly   Musculoskeletal: Normal range of motion. He exhibits no edema or tenderness.   Neurological: He is alert and oriented to person, place, and time. He has normal reflexes. No cranial nerve deficit. He exhibits normal muscle tone. Coordination normal.   Skin: Skin is warm and dry.   Psychiatric: He has a normal mood and affect.       Lab Results   Component Value Date    WBC 7.54 01/10/2020    HGB 16.2 01/10/2020    HCT 50.6 01/10/2020    MCV 90 01/10/2020     (L) 01/10/2020    CO2 30 (H) 01/10/2020    CREATININE 1.2 01/10/2020    CALCIUM 9.1 01/10/2020    ALBUMIN 3.9 01/10/2020    AST 30 01/10/2020     (H) 01/10/2020    ALT 30 01/10/2020       Lab Results   Component Value Date    INR 1.0 07/17/2018       BNP   Date Value Ref Range Status   01/10/2020 301 (H) 0 - 99 pg/mL Final     Comment:     Values of less than 100 pg/ml are consistent with non-CHF populations.   08/19/2019 410 (H) 0 - 99 pg/mL Final     Comment:     Values of less than 100 pg/ml are consistent with non-CHF populations.   02/14/2019 365 (H) 0 - 99 pg/mL Final     Comment:     Values of less than 100 pg/ml are consistent with non-CHF populations.       No results found for: LDH    Tacrolimus Lvl   Date Value Ref Range Status    08/19/2019 2.4 (L) 5.0 - 15.0 ng/mL Final     Comment:     Testing performed by Liquid Chromatography-Tandem  Mass Spectrometry (LC-MS/MS).  This test was developed and its performance characteristics  determined by [facility], Department of Pathology  and Laboratory Medicine in a manner consistent with CLIA  requirements. It has not been cleared or approved by the [place]  Food and Drug Administration.  This test is used for clinical   purposes.  It should not be regarded as investigational or for  research.       No results found for: SIROLIMUS  No results found for: CYCLOSPORINE    No results found for this or any previous visit.  No results found for this or any previous visit.    Labs were reviewed with the patient.    Assessment:     1. History of heart transplant    2. Heart transplant failure and rejection    3. Chronic rejection of heart transplant    4. Chronic combined systolic and diastolic heart failure    5. Atherosclerosis of native coronary artery of transplanted heart without angina pectoris        Plan:   Decrease prednisone to 5 mg daily    Repeat echo in 2 months    Return instructions as set forth by post transplant schedule or as needed:    Clinic: Return for labs and/or biopsy weekly the first month, every two weeks during month 2 and then monthly for the first year at the provider or coordinator's discretion. During the second year, return to clinic every 3 months. Post transplant year 3-5 return every 6 months. There will be a comprehensive post transplant evaluation every year that may include LHC/RHC/biopsy, stress test, echo, CXR, and other health screening exams.    In addition to the clinical assessment, I have ordered Allomap testing for this patient to assist in identification of moderate/severe acute cellular rejection (ACR) in a pt with stable Allograft function instead of endomyocardial biopsy.     Patient is reminded to call with any health changes as these can be early  signs of transplant complications. Patient is advised to make sure any new medications or changes of old medications are discussed with a pharmacist or physician knowledgeable with transplant to avoid rejection/drug toxicity related to significant drug interactions.    UNOS Patient Status  Functional Status: 90% - Able to carry on normal activity: minor symptoms of disease  Physical Capacity: Limited Mobility  Working for Income: No  If no, reason not working: Disability    Romeo Rodríguez MD

## 2020-01-10 NOTE — LETTER
January 15, 2020            Dear Jamar Smiley,     Patient: Jamar Smiley   MR Number: [id number]   YOB: 1990     Please see patient's attached lab results from your last visit. I have tried to call you, the number listed   Is a wrong number, please contact me at [phone] to update your phone number.  I would like you to follow up with your PCP. We would like you to get repeat labs to check you potassium   Level and would like you to get an abdominal Ultrasound.    If you have any questions or concerns, please don't hesitate to call.    Sincerely,       Jelly Fox RN, BSN  [facility]  [address] [place] [id number]  [phone]         Results for orders placed or performed during the hospital encounter of 01/10/20   Transthoracic echo (TTE) 2D with Color Flow   Result Value Ref Range    BSA 2.27 m2    TDI SEPTAL 0.14 m/s    LV LATERAL E/E' RATIO 11.89 m/s    LV SEPTAL E/E' RATIO 7.64 m/s    TDI LATERAL 0.09 m/s    LVIDD 4.85 3.5 - 6.0 cm    IVS 0.89 0.6 - 1.1 cm    PW 0.77 0.6 - 1.1 cm    LVIDS 3.94 2.1 - 4.0 cm    FS 19 28 - 44 %    Sinus 3.01 cm    STJ 2.59 cm    LV mass 135.23 g    RVDD 4.08 cm    TAPSE 0.85 cm    Left Ventricle Relative Wall Thickness 0.32 cm    AV mean gradient 3 mmHg    AV valve area 2.47 cm2    AV Velocity Ratio 0.69     AV index (prosthetic) 0.71     E/A ratio 1.10     Mean e' 0.12 m/s    E wave decelartion time 153.51 msec    LVOT diameter 2.10 cm    LVOT area 3.5 cm2    LVOT peak juan 0.82 m/s    LVOT peak VTI 14.15 cm    Ao peak juan 1.19 m/s    Ao VTI 19.86 cm    LVOT stroke volume 48.99 cm3    AV peak gradient 6 mmHg    E/E' ratio 9.30 m/s    MV Peak E Juan 1.07 m/s    TR Max Juan 2.29 m/s    MV Peak A Juan 0.97 m/s    LV Systolic Volume 67.70 mL    LV Systolic Volume Index 30.2 mL/m2    LV Diastolic Volume 110.40 mL    LV Diastolic Volume Index 49.27 mL/m2    LV Mass Index 60 g/m2    Triscuspid Valve Regurgitation  Peak Gradient 21 mmHg    Right Atrial Pressure (from IVC) 3 mmHg    QEF 49 %    TV rest pulmonary artery pressure 24 mmHg      [facility] [address]  [facility] [id number]  Phone: [phone]

## 2020-01-14 NOTE — TELEPHONE ENCOUNTER
Met with pt and his mother, Encouraged him to see his local pcp on a regular basis, pt stated that he sees a NP   At home.    Reviewed his medication, will send office visit to pcp and schedule abdominal u/s.

## 2020-02-21 NOTE — TELEPHONE ENCOUNTER
Left a message asking pt to call me back and see if he will be making his appt's today,   He is no show for labs and echo appt's so far today.      No answer.

## 2020-04-13 NOTE — TELEPHONE ENCOUNTER
Returned call to pt's mother regarding her voice mail left over the weekend about setting up an appt for Tray,   Left a message for her to please call back to discuss.

## 2020-04-20 NOTE — TELEPHONE ENCOUNTER
Returned call to pt Pt stated that he needed refills on medications. Prograf, ASA , Prednisone 10- ( per last note - decrease to 5 mg ) and ambien. Informed pt coordinator will review with him any changes. Pt stated that he wanted to see when he would be able to schedule his follow up. Informed him that I would send a message to his coordinator , to follow up regarding visit schedule. Set pt up [facility]. Will also email him the password . Reset pt's [facility] account, but pt was still having trouble signing in. Refilled pt medications. Gave pt call back number if needed any further questions or concern.Pt verbalized understanding.

## 2020-04-20 NOTE — TELEPHONE ENCOUNTER
----- Message from Leelee Moise MA sent at 4/20/2020  9:55 AM CDT -----  Contact: patient call  The patient would like to talk to the nurse Jelly  about his medications please call [phone]. Thank you.

## 2020-04-21 NOTE — TELEPHONE ENCOUNTER
----- Message from Alla Linares LPN sent at 4/20/2020 11:40 AM CDT -----  Regarding: update  Returned call to pt Pt stated that he needed refills on medications. Prograf, ASA , Prednisone 10- ( per last note - decrease to 5 mg ) and ambien. Informed pt coordinator will review with him any changes. Pt stated that he wanted to see when he would be able to schedule his follow up. Informed him that I would send a message to his coordinator , to follow up regarding visit schedule. Set pt up [facility]. Will also email him the password . Reset pt's [facility] account, but pt was still having trouble signing in. Refilled pt medications. Gave pt call back number if needed any further questions or concern.Pt verbalized understanding.

## 2020-05-13 NOTE — Clinical Note
I have reviewed Active Medication List, Allergies, Vital Signs and Active Problem List.    Chief Complaint   Patient presents with   • Follow-up     4 month check     HTN/DM    Subjective:    Chief Complaint: Diabetes    Anu is a 58 year old female with a history of diabetes. STATUS: relatively stable. She denies polyuria or polydypsia.         Additionally she has discomfort in the right knee.  This is chronic.  She has seen Dr. Mchugh in the past.  She has had injections in the past without significant improvement per her report    She has underlying COPD which she describes is stable.  She states her pulmonologist stopped Advair because the patient did not like the way it tasted.  She states her breathing is stable.  She does have a rescue inhaler.    Recently was placed on both allopurinol and colchicine by Rheumatology.  Allopurinol was adjusted upwards a week ago.  Because of colchicine usage she was switched pravastatin.    Cholesterol has since worsened.    She voices no other additional concerns at this time.     Current Outpatient Medications   Medication Sig Dispense Refill   • omeprazole (PRILOSEC) 40 MG capsule TAKE 1 CAPSULE BY MOUTH DAILY 90 capsule 1   • allopurinol (ZYLOPRIM) 100 MG tablet Take 2 tablets by mouth daily. 180 tablet 1   • albuterol 108 (90 Base) MCG/ACT inhaler Inhale 2 puffs into the lungs every 4 hours as needed for Shortness of Breath or Wheezing. 1 Inhaler 5   • carvedilol (COREG) 12.5 MG tablet TAKE 1 TABLET BY MOUTH TWICE DAILY 180 tablet 1   • doxepin (SINEQUAN) 10 MG capsule TAKE 1 CAPSULE BY MOUTH EVERY NIGHT 90 capsule 1   • colchicine (COLCRYS) 0.6 MG tablet Take 1 tablet by mouth daily. 30 tablet 3   • Incontinence Supply Disposable (INCONTINENCE BRIEF LARGE) Misc Use 3 briefs daily as needed 90 each 3   • glipiZIDE (GLUCOTROL) 10 MG tablet Take 1 tablet by mouth 2 times daily (before meals). 180 tablet 3   • rOPINIRole (REQUIP) 0.5 MG tablet Take 1 tablet by mouth  The sheath is removed from the left internal jugular vein.  nightly. 90 tablet 1   • blood glucose (ONE TOUCH ULTRA TEST) test strip Test blood sugar ONCE daily as directed. Diagnosis: E11.9. Meter: One touch 100 strip 3   • ergocalciferol (DRISDOL) 1.25 mg (50,000 units) capsule Take 1 capsule by mouth 1 day a week. 5 capsule 11   • furosemide (LASIX) 40 MG tablet Take 1 tablet by mouth daily as needed (edema LE). 30 tablet 1   • lisinopril (ZESTRIL) 10 MG tablet Take 1 tablet by mouth daily. 90 tablet 3   • albuterol-ipratropium 2.5 mg/0.5 mg (DUONEB) 0.5-2.5 (3) MG/3ML nebulizer solution Take 3 mLs by nebulization every 4 hours. 75 mL 1   • acetaminophen (TYLENOL) 500 MG tablet Take 500 mg by mouth every 6 hours as needed for Pain.     • atorvastatin (LIPITOR) 20 MG tablet Take 1 tablet by mouth daily. 30 tablet 11   • traZODone (DESYREL) 50 MG tablet Take 1 tablet by mouth nightly. 30 tablet 5     No current facility-administered medications for this visit.            Objective:    Visit Vitals  BP (!) 144/94   Pulse 76   Temp 97.6 °F (36.4 °C) (Temporal)   Resp 20   Wt 131.5 kg   BMI 54.80 kg/m²       General: Well Developed, Well Nourished. Nontoxic in appearance  HEENT:Conjunctiva are pink, Oropharynx is moist and without exudates  Neck: No carotid bruits are noted bilaterally  Cv: Regular Heart Rate and Rhythm. Normal S1 and S2.    No S3 or S4 heard.   No gallops, rubs or murmurs  Lungs:  Respiratory effort is normal. No rales, rhonchi, or Wheezing is noted.  No accessory muscle use is noted.  Musculoskeletal/extremities:no edema noted to the bilateral lower extremities  Neurologic: Awake, Alert and Oriented to Person, Place, Time and Situation  Skin: Warm, Dry and Intact. No cyanosis or pallor.  No clubbing.  No rashes.  Psychiatric: Normal Mood and Affect. Thought content normal.     Assessment:    Labs:    Lab Services on 05/08/2020   Component Date Value Ref Range Status   • Fasting Status 05/08/2020 12  Hours Final   • Sodium 05/08/2020 140  135 - 145 mmol/L  Final   • Potassium 05/08/2020 4.1  3.4 - 5.1 mmol/L Final   • Chloride 05/08/2020 105  98 - 107 mmol/L Final   • Carbon Dioxide 05/08/2020 27  21 - 32 mmol/L Final   • Anion Gap 05/08/2020 12  10 - 20 mmol/L Final   • Glucose 05/08/2020 142* 65 - 99 mg/dL Final   • BUN 05/08/2020 27* 6 - 20 mg/dL Final   • Creatinine 05/08/2020 1.48* 0.51 - 0.95 mg/dL Final   • Glomerular Filtration Rate 05/08/2020 44* >90 Final    eGFR 30-59 mL/min/1.73m2 = Moderate decrease in kidney function. Stage 3 CKD (chronic kidney disease) or moderate kidney disease.   • BUN/ Creatinine Ratio 05/08/2020 18  7 - 25 Final   • Bilirubin, Total 05/08/2020 0.2  0.2 - 1.0 mg/dL Final   • GOT/AST 05/08/2020 12  <=37 Units/L Final   • Alkaline Phosphatase 05/08/2020 71  45 - 117 Units/L Final   • Albumin 05/08/2020 3.6  3.6 - 5.1 g/dL Final   • Protein, Total 05/08/2020 7.3  6.4 - 8.2 g/dL Final   • Globulin 05/08/2020 3.7  2.0 - 4.0 g/dL Final   • A/G Ratio 05/08/2020 1.0  1.0 - 2.4 Final   • GPT/ALT 05/08/2020 22  <64 Units/L Final   • Calcium 05/08/2020 8.9  8.4 - 10.2 mg/dL Final   • Hemoglobin A1C 05/08/2020 6.7* 4.5 - 5.6 % Final      Diabetic Screening  Non Diabetic:             <5.7%  Increased Risk:           5.7-6.4%  Diagnostic For Diabetes:  >6.4%    Diabetic Control  A1C%       eAG mg/dL  6.0            126  6.5            140  7.0            154  7.5            169  8.0            183  8.5            197  9.0            212  9.5            226  10.0           240   • Fasting Status 05/08/2020 12  Hours Final   • Cholesterol 05/08/2020 240* <=199 mg/dL Final   • Triglycerides 05/08/2020 195* <=149 mg/dL Final   • HDL 05/08/2020 47* >=50 mg/dL Final   • LDL 05/08/2020 154* <=129 mg/dL Final    OPTIMAL           <100  NEAR OPTIMAL      100 to 129  BORDERLINE HIGH   130 to 159  HIGH              160 to 189  VERY HIGH         >=190   • Non-HDL Cholesterol 05/08/2020 193  mg/dL Final    Therapeutic Target:  CHD and risk equivalents   <130  Multiple risk factors     <160  0 to 1 risk factor        <190   • Cholesterol/ HDL Ratio 05/08/2020 5.1* <=4.4 Final   • Vitamin B12 05/08/2020 411  211 - 911 pg/mL Final   • Vitamin D, 25-Hydroxy 05/08/2020 46.3  30.0 - 100.0 ng/mL Final    <20 ng/mL  =  Vitamin D deficiency  20-29 ng/mL  =  Vitamin D insufficiency   ng/mL  =  Optimal Vitamin D  >150 ng/mL  =  Possible toxicity   • Folate 05/08/2020 7.4  >=5.5 ng/mL Final   • Uric Acid 05/08/2020 6.4* 2.6 - 5.9 mg/dL Final    The optimal level of uric acid in patients with gout or uric acid stone formation should be 6.0 mg/dL or less and at core body temperature the saturation point is 6.8 mg/dL (Veronica et al, N Engl J Med. 2005 and Tam et al, Arthritis Rheum. 2004).   • WBC 05/08/2020 6.8  4.2 - 11.0 K/mcL Final   • RBC 05/08/2020 4.50  4.00 - 5.20 mil/mcL Final   • HGB 05/08/2020 12.6  12.0 - 15.5 g/dL Final   • HCT 05/08/2020 42.2  36.0 - 46.5 % Final   • MCV 05/08/2020 93.8  78.0 - 100.0 fl Final   • MCH 05/08/2020 28.0  26.0 - 34.0 pg Final   • MCHC 05/08/2020 29.9* 32.0 - 36.5 g/dL Final   • PLT 05/08/2020 253  140 - 450 K/mcL Final   • RDW-SD 05/08/2020 48.7  39.0 - 50.0 fL Final   • RDW-CV 05/08/2020 14.2  11.0 - 15.0 % Final   • NRBC 05/08/2020 0  <=0 /100 WBC Final       Diagnoses pertaining to today's visit/meds/labs:    Acute pain of right knee  (primary encounter diagnosis)     Orders Placed This Encounter   • SERVICE TO ORTHOPEDICS     Referral Priority:   Routine     Referral Type:   Consult & Treatment     Referred to Provider:   Chaim Mchugh MD     Number of Visits Requested:   1     Expiration Date:   5/13/2021   • atorvastatin (LIPITOR) 20 MG tablet     Sig: Take 1 tablet by mouth daily.     Dispense:  30 tablet     Refill:  11   • traZODone (DESYREL) 50 MG tablet     Sig: Take 1 tablet by mouth nightly.     Dispense:  30 tablet     Refill:  5       __________________________________________      SEE FURTHER DISCUSSION BELOW        Plan:    1. Diabetes-stable control.  She will continue current regimen  2. Dyslipidemia-switching from pravastatin to atorvastatin.  She will make this which after stopping colchicine  3. Gout-she will discontinue colchicine in another week.  Allopurinol was adjusted a week ago.  She follows with Dr. Gonzalez    4. COPD-continue current regimen.  Follows with pulmonology  5. Vitamin-D deficiency -level was normal on May 8  6. Knee pain-on examination there was no swelling or deformity neither was both medial and lateral joint line tenderness.  Referral to Orthopedics is made for further discussion.  Previous x-rays have shown significant arthritis   7. Insomnia-we will stop Wellbutrin in favor trazodone which will likely help with this  8. Tobacco use

## 2020-06-22 NOTE — NURSING
"Met with Pt in clinic for delayed annual d/t COVID. Pt stated that he is no long working at this time - until the end of the year. Pt stated that he has been compliant with his medications . Pt reports that he drinks a case of beer every 2 days and when at the bar or restaurant he has "candis galarza", pt and girlfriend reports that he does not drink any water, eats, chocolate swiss rolls, oreos, candy, muffins, and fried foods often. Pt reports drink Dr. Pepper or cokes - no water. Educated and instructed pt to increase his water intake to 4-5 bottles ( 16 oz daily ). Pt also stated that he has times when he is SOB., dizzy - while having intercourse becomes SOB, lightheaded. Asked pt about his VS. Pt reports he does not take them. Informed pt that he will need to take his vitals twice daily - and that he can be having orthostatic BP, pt and girlfriend verbalized understanding. Pt and girlfriend stated that he "picks" his scabs around his varicose veins and has recently been bleeding heavily. Dr. Rodríguez and I instructed pt to stop picking scabs or skin, order for consult to see vein clinic. Pt stated that he had trouble sleeping asked pt to follow up with his PCP and they can possibly get a sleep study. Put order in for smoking cessation clinic. Pt needs to see a Dermatologist, dentist and establish care with local cardiologist . Updated care team with his PCP.   Pt had bandage on his lower leg, pt reports picking veins and blood coming out. Dr. Rodríguez stated we will not redress wound and try to get pt in Vasc Clinic . Called - no appts . Scheduled for next available.   Pre dr. Rodríguez- pt needs an angiogram, ECHo 45-50%, and BNP increased. Called Int. Cards- will send message to Dr. Barahona's team to schedule LHC/ with Biopsy.      Pt reports that he currently smokes - 0.5 -1 pack a day- has stopped vaping and THC.     Pt and GF asked to stop ASA and Plavix. Informed pt that he has stents and disease we would " have to get cath prior and discuss with team

## 2020-06-22 NOTE — PROGRESS NOTES
Subjective:   Mr. Smiley is a 29 y.o. year old white male who received a  heart transplant on 3/23/10.      CMV status:   Donor:   Recipient: +    Congestive Heart Failure  Pertinent negatives include no abdominal pain, chest pain, chills, coughing, diaphoresis, fever or weakness.       27 yo male, new to our service, s/p OHTx in 2010 at [facility] presented to the ED with ~ 2 week h/o worsening CORTEZ and JUN that extends up to scrotum. He relates that he cut ties with the [facility] program ~ 2-3 years ago after undergoing coronary PCI's to his transplanted heart. Was doing well until April when he was hospitalized at [facility] for volume overload and was diuresed. Was seen by Dr. Holliday 7/6/18. TTE in February showed LVEF 53%, RV mod enlarge, LA markedly dilated and mild to mod MR. TTE showed a decline in LVEF to 25-30% and was admitted here for management. He admited that he takes his meds sporadically. Supposed to be on Prograf 1 mg bid. Also supposed to take Plavix for his PCI's, and says he takes Lasix 80 mg from time to time.        Prograf 2/2 MMF 1000 mg bid and prednisone 5 mg daily Bactrim    Doing well    Varicose veins in both legs    Lack of sleep. CAnt sleep            CONCLUSIONS     1 - Moderately depressed left ventricular systolic function (EF 35-40%).     2 - Post-cardiac transplantation study.     3 - Mildly depressed right ventricular systolic function .     4 - Pulmonary hypertension. The estimated PA systolic pressure is 47 mmHg.     5 - Mitral annulus inversus, raises concern for constriction, shortned deceleration time, however no respiratory variation obtained..  LVEDP=40mmHg.          RHC performed via left IJV.    PCWP=40mmHg.    PA=62/40(5)mmHg.    RVSP=62mmHg.    Mean RA=30mmHg.    Suman CO=4.85.    Suman CI=2.24.     Angiographic Results     Diagnostic:          Patient has a right dominant coronary artery.        - Left Main Coronary Artery:             The LM has a 10% stenosis. There is  DENIZ 3 flow. The remaining portion of the vessel has luminal irregularities (10).     - Left Anterior Descending Artery:             The proximal LAD has luminal irregularities. There is DENIZ 3 flow.             The mid LAD has a 90% stenosis. There is DENIZ 3 flow. The remaining portion of the vessel has luminal irregularities (10). The 90% mid LAD stenosis is ISR.             The distal LAD has chronic total occlusion. There is DENIZ 0 flow.     - D1:             The D1 has luminal irregularities. There is DENIZ 3 flow.     - D2:             The D2 has luminal irregularities. There is DENIZ 3 flow.     - Left Circumflex Artery:             The proximal LCX has luminal irregularities. There is DENIZ 3 flow.             The distal LCX has luminal irregularities. There is DENIZ 3 flow. The remaining portion of the vessel is of small caliber.     - OM1:             The OM1 has luminal irregularities. There is DENIZ 3 flow. The remaining portion of the vessel is of small caliber.     - OM2:             The OM2 has a 99% stenosis. There is DENIZ 3 flow. The remaining portion of the vessel is of small caliber.     - OM3:             The OM3 has a 60% stenosis. There is DENIZ 3 flow. The remaining portion of the vessel has luminal irregularities (10).     - Right Coronary Artery:             The proximal RCA has a 10% stenosis. There is DENIZ 3 flow. The remaining portion of the vessel has luminal irregularities (10).             The mid RCA has a 30% stenosis. There is DENIZ 3 flow. The remaining portion of the vessel has luminal irregularities (10).             The distal RCA is normal. There is DENIZ 3 flow.     - Posterior Lateral Branch:             The proximal PLB has luminal irregularities. There is DENIZ 3 flow.             The distal PLB is diffusely diseased (75). There is DENIZ 3 flow.     - Posterior Descending Artery:             The PDA has luminal irregularities. There is DENIZ 3 flow.    CAV as detailed below including  "90% mid LAD ISR.    Occluded right EIV.    Occluded left CFV.    Occluded right IJV.        Hospital Course: He was started on IV Lasix for diureses at admit. In addition, INV CARDS was consulted and LHC/RHC was performed which revealed cccluded right EIV, left CFV, and right IJV. 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 PCWP = 40 mmHg, Mean RA = 30 mmHg, and Suman CI = 2.24. Unable to get Bx. DSA was sent which revealed CW2(45581),CW7([id number]). He was administered a solumedrol 1gm bolus x 3 doses, followed by prednisone 20mg orally daily. Prednisone was decreased to 10mg orally daily on discharge. FK was increased to 2mg orally twice daily for goal of 5-10. MMF was started at 500mg orally daily. He reported being on rapamune at some point, but was unable to afford the cost. This was not restarted, as it is unclear how long he has been off of it. MMF was initiated with plan to increase dose to 1500mg as tolerated. OI prophylaxis includes nystatin for 1 month and bactrim SS for 6 months. Follow up potassium level needed. Valcyte was not started as CMV IgG was positive, and affordability. He will follow with [facility] in the future and will come back to clinic in 1-2 weeks with echo prior      Review of Systems   Constitution: Negative for chills, decreased appetite, diaphoresis, fever, malaise/fatigue, night sweats, weight gain and weight loss.   Cardiovascular: Negative for chest pain, claudication, cyanosis, dyspnea on exertion, irregular heartbeat, leg swelling, near-syncope, orthopnea and palpitations.   Respiratory: Negative for cough, hemoptysis, shortness of breath, sleep disturbances due to breathing, snoring, sputum production and wheezing.    Gastrointestinal: Negative for abdominal pain and diarrhea.   Neurological: Negative for weakness.       Objective:   Blood pressure 120/81, pulse 84, height 6' 1" (1.854 m), weight 104.3 kg (229 lb 15 oz).body mass index is 30.34 kg/m².    Physical Exam   Constitutional: He is " oriented to person, place, and time. He appears well-developed and well-nourished.   HENT:   Head: Normocephalic and atraumatic.   Eyes: Pupils are equal, round, and reactive to light. Conjunctivae and EOM are normal.   Neck: Normal range of motion. Neck supple. No JVD present.   Cardiovascular: Normal rate and regular rhythm. Exam reveals no gallop and no friction rub.   No murmur heard.  Pulmonary/Chest: Breath sounds normal. No respiratory distress. He has no wheezes. He has no rales. He exhibits no tenderness.   Abdominal: Soft. Bowel sounds are normal. He exhibits no distension and no mass. There is no hepatosplenomegaly, splenomegaly or hepatomegaly. There is no abdominal tenderness. There is no rebound, no guarding and no CVA tenderness.   No hepatoslenomegaly   Musculoskeletal: Normal range of motion.         General: No tenderness or edema.   Neurological: He is alert and oriented to person, place, and time. He has normal reflexes. No cranial nerve deficit. He exhibits normal muscle tone. Coordination normal.   Skin: Skin is warm and dry.   Psychiatric: He has a normal mood and affect.       Lab Results   Component Value Date    WBC 7.54 01/10/2020    HGB 16.2 01/10/2020    HCT 50.6 01/10/2020    MCV 90 01/10/2020     (L) 01/10/2020    CO2 30 (H) 01/10/2020    CREATININE 1.2 01/10/2020    CALCIUM 9.1 01/10/2020    ALBUMIN 3.9 01/10/2020    AST 30 01/10/2020     (H) 01/10/2020    ALT 30 01/10/2020       Lab Results   Component Value Date    INR 1.0 07/17/2018       BNP   Date Value Ref Range Status   01/10/2020 301 (H) 0 - 99 pg/mL Final     Comment:     Values of less than 100 pg/ml are consistent with non-CHF populations.   08/19/2019 410 (H) 0 - 99 pg/mL Final     Comment:     Values of less than 100 pg/ml are consistent with non-CHF populations.   02/14/2019 365 (H) 0 - 99 pg/mL Final     Comment:     Values of less than 100 pg/ml are consistent with non-CHF populations.       No results  found for: LDH    Tacrolimus Lvl   Date Value Ref Range Status   01/10/2020 4.8 (L) 5.0 - 15.0 ng/mL Final     Comment:     Testing performed by Liquid Chromatography-Tandem  Mass Spectrometry (LC-MS/MS).  This test was developed and its performance characteristics  determined by [facility], Department of Pathology  and Laboratory Medicine in a manner consistent with CLIA  requirements. It has not been cleared or approved by the [place]  Food and Drug Administration.  This test is used for clinical   purposes.  It should not be regarded as investigational or for  research.       No results found for: SIROLIMUS  No results found for: CYCLOSPORINE    No results found for this or any previous visit.  No results found for this or any previous visit.    Labs were reviewed with the patient.    Assessment:     1. History of heart transplant    2. Hypomagnesemia    3. Status post heart transplant    4. Heart transplant failure and rejection    5. Chronic rejection of heart transplant    6. Atherosclerosis of native coronary artery of transplanted heart without angina pectoris        Plan:   Vein clinic and sleep study    Needs stress test (PET scan) to check for ischemia or coronary angiogram      Return instructions as set forth by post transplant schedule or as needed:    Clinic: Return for labs and/or biopsy weekly the first month, every two weeks during month 2 and then monthly for the first year at the provider or coordinator's discretion. During the second year, return to clinic every 3 months. Post transplant year 3-5 return every 6 months. There will be a comprehensive post transplant evaluation every year that may include LHC/RHC/biopsy, stress test, echo, CXR, and other health screening exams.    In addition to the clinical assessment, I have ordered Allomap testing for this patient to assist in identification of moderate/severe acute cellular rejection (ACR) in a pt with stable Allograft function instead  of endomyocardial biopsy.     Patient is reminded to call with any health changes as these can be early signs of transplant complications. Patient is advised to make sure any new medications or changes of old medications are discussed with a pharmacist or physician knowledgeable with transplant to avoid rejection/drug toxicity related to significant drug interactions.    UNOS Patient Status  Functional Status: 90% - Able to carry on normal activity: minor symptoms of disease  Physical Capacity: Limited Mobility  Working for Income: No  If no, reason not working: Disability    Romeo Rodríguez MD

## 2020-06-23 NOTE — TELEPHONE ENCOUNTER
Pt called and stated that he forgot to  the medications from the pharmacy. Informed pt to call the pharmacy and see if the medication can be shipped. Pt verbalized understanding

## 2020-06-23 NOTE — TELEPHONE ENCOUNTER
Spoke with pt and discussed talking with pharmacy, he was asking to   Send prescriptions to another address, I explained that he should discuss with the pharmacy directly to avoid confusion.

## 2020-07-06 NOTE — TELEPHONE ENCOUNTER
Pt called and stated that he was seen today at the Dentist office of- Dr. Kiera Velazquez, [phone]. Pt reports that he had xrays and was informed that he needs a tooth pulled and that there was puss coming out of the gum / tooth area. Pt reports that he was ordered antibiotics. Asked pt if he had taken the antibiotics prior - per protocol. Pt stated that he has not. Pt also stated that he has not picked up any medications( immunosuppression). Pt reports missing today's medications. Pt stated that he wanted the office to call the dentist office regarding clearance. Verbalized understanding.    Called Dr. Kiera Velazquez's office. Spoke with Dental assistant, she reports that this was the first time seeing pt. She reports due to his history that he needs a clearance from our office to remove tooth. She reports that the tooth is puss filled. She reports that there is no other issues at this time, except cleanings. She reports that if it is difficult to remove, the doctor prefers to see pt back in 2 weeks. She reports that no pain medications will be given. Pt was given Augmentin 875mg to take BID for 7 days. She reports that it is okay that pt is on Plavix and aspirin and does not need to stop medications.   I confirmed fax number to send clearance and will send prescription for [employer] refill.     Reported off to pt's coordinator. BONIFACIO Fox RN.

## 2020-07-06 NOTE — TELEPHONE ENCOUNTER
Dr. Velazquez's office called stated that they faxed over a clearance to [phone], however they have had trouble with the fax number and would like to email us  Their email address us [email or web address].

## 2020-07-06 NOTE — TELEPHONE ENCOUNTER
Spoke with pt this morning and he stated that he was at the Pharmacy picking up his medications, the antibiotic (augmentin) from the dentist and the ones for his heart transplant, Asked if he had missed any doses and he stated that he was going to take the medication after he gets it from the pharmacy, he did not have doses for this morning.     Pt stated that his tooth hurts, I explained it hurts when it is infected he should take the antibiotics. Will speak again with Dr. Kiera Velazquez's office.   I have called and left a message for Dr. Kiera Velazquez's office to return my phone call, left my direct phone number and fax number for them to call back.

## 2020-07-07 NOTE — TELEPHONE ENCOUNTER
Spoke with sasha and Kiera at Dr. Winters and faxed over clearance. Regarding tooth extraction and antibiotics.

## 2020-07-09 PROBLEM — E78.5 DYSLIPIDEMIA: Status: ACTIVE | Noted: 2020-01-01

## 2020-07-09 NOTE — LETTER
July 9, 2020      Romeo Rodríguez MD  [address]  [facility] [address]           [facility]  [address]  [place] [address]  Phone: [phone]          Patient: Jamar Smiley   MR Number: [id number]   YOB: 1990   Date of Visit: 7/9/2020       Dear Dr. Romeo Rodríguez:    Thank you for referring Jamar Smiley to me for evaluation. Attached you will find relevant portions of my assessment and plan of care.    If you have questions, please do not hesitate to call me. I look forward to following Jamar Smiley along with you.    Sincerely,    Chris Barahona MD    Enclosure  CC:  No Recipients    If you would like to receive this communication electronically, please contact [email or web address] or [phone] to request more information on enercast Link access.    For providers and/or their staff who would like to refer a patient to [facility], please contact us through our one-stop-shop provider referral line, [facility], at [phone].    If you feel you have received this communication in error or would no longer like to receive these types of communications, please e-mail [email or web address]

## 2020-07-09 NOTE — PROGRESS NOTES
Subjective:    Patient ID:  Jamar Smiley is a 29 y.o. male who presents for follow-up of heart transplant    The patient location is:home  The chief complaint leading to consultation is: s/p heart transplant  Visit type: audiovisual    Face to Face time with patient: 30 minutes  60 minutes of total time spent on the encounter, which includes face to face time and non-face to face time preparing to see the patient (eg, review of tests), Obtaining and/or reviewing separately obtained history, Documenting clinical information in the electronic or other health record, Independently interpreting results (not separately reported) and communicating results to the patient/family/caregiver, or Care coordination (not separately reported).         Each patient to whom he or she provides medical services by telemedicine is:  (1) informed of the relationship between the physician and patient and the respective role of any other health care provider with respect to management of the patient; and (2) notified that he or she may decline to receive medical services by telemedicine and may withdraw from such care at any time.    Notes: see below      HPI  Mr. Smiley is a 28 y/o gentleman who underwent OHT in 2010 at [facility].  Today the patient reports shortness of breath X 6 months brought on with exertion such as walking 2-3 blocks that is relieved with rest.  It is also brought on with sexual intercourse to the severity that he must stop.  He denies chest pain.  However the shortness of breath is associated with lightheadedness.  Additionally the patient reports the development of 2 pillow orthopnea over the same period of time.  He reports PND every other night.  He report LE edema up his scrotum. The patient denies recent syncope, but does reports the aforementioned lightheadness with exertion as well palpitations brought on with doing yard work that lasts 5 minutes.  Additionally the patient states he has developed restless  legs during the last 6 minutes. He report when he sleeps his legs start moving uncontrollably.  Finally the patient reports that he would like his pacemaker device(no longer connected, but left inplace after OHT) to be removed.    Past Medical History:   Diagnosis Date    Heart transplanted     Hypertension      Past Surgical History:   Procedure Laterality Date    BIOPSY WITH ULTRASOUND GUIDANCE N/A 11/27/2018    Procedure: BIOPSY, WITH US GUIDANCE;  Surgeon: Raffy Stafford Jr., MD;  Location: [facility] CATH LAB;  Service: Cardiology;  Laterality: N/A;    CARDIAC SURGERY      HEART TRANSPLANT       Current Outpatient Medications on File Prior to Visit   Medication Sig Dispense Refill    aspirin (ECOTRIN) 81 MG EC tablet Take 1 tablet (81 mg total) by mouth once daily. 30 tablet 11    atorvastatin (LIPITOR) 80 MG tablet Take 1 tablet (80 mg total) by mouth every evening. 30 tablet 11    clopidogreL (PLAVIX) 75 mg tablet Take 1 tablet (75 mg total) by mouth once daily. 30 tablet 5    furosemide (LASIX) 80 MG tablet Take 1 tablet (80 mg total) by mouth 2 (two) times daily. 60 tablet 11    lisinopriL 10 MG tablet Take 1 tablet (10 mg total) by mouth once daily. 30 tablet 5    magnesium oxide (MAG-OX) 400 mg (241.3 mg magnesium) tablet Take 1 tablet (400 mg total) by mouth 2 (two) times daily. 60 tablet 11    mycophenolate (CELLCEPT) 250 mg Cap Take 4 capsules (1,000 mg total) by mouth 2 (two) times daily. 240 capsule 5    predniSONE (DELTASONE) 10 MG tablet Take 1 tablet (10 mg total) by mouth once daily. 30 tablet 11    tacrolimus (PROGRAF) 1 MG Cap Take 2 capsules (2 mg total) by mouth every 12 (twelve) hours. 120 capsule 11    zolpidem (AMBIEN) 5 MG Tab Take 1 tablet (5 mg total) by mouth nightly as needed. 30 tablet 0     No current facility-administered medications on file prior to visit.      Review of patient's allergies indicates:   Allergen Reactions    Contrast media Shortness Of Breath,  Itching and Rash     Social History     Tobacco Use    Smoking status: Former Smoker     Quit date: 2018     Years since quittin.2   Substance Use Topics    Alcohol use: No    Drug use: Yes     Types: Marijuana     Comment: several times a day     Family History   Problem Relation Age of Onset    Diabetes Mother     Hypertension Mother     Hypertension Father          Review of Systems   Constitution: Negative for decreased appetite, diaphoresis, fever, malaise/fatigue, weight gain and weight loss.   HENT: Negative for congestion, nosebleeds and sore throat.    Eyes: Negative for blurred vision, vision loss in left eye, vision loss in right eye and visual disturbance.   Cardiovascular: Positive for dyspnea on exertion, orthopnea, palpitations and paroxysmal nocturnal dyspnea. Negative for chest pain, claudication, leg swelling, near-syncope and syncope.   Respiratory: Negative for cough, hemoptysis, shortness of breath and wheezing.    Endocrine: Negative for polyuria.   Hematologic/Lymphatic: Does not bruise/bleed easily.   Skin: Negative for nail changes and rash.   Musculoskeletal: Negative for back pain, muscle cramps and myalgias.   Gastrointestinal: Negative for abdominal pain, change in bowel habit, diarrhea, heartburn, hematemesis, hematochezia, melena, nausea and vomiting.   Genitourinary: Negative for bladder incontinence, dysuria, frequency and hematuria.   Neurological: Positive for light-headedness.   Psychiatric/Behavioral: Negative for depression.   Allergic/Immunologic: Negative for hives.        Objective:    Physical Exam    not performed as this was a telehealth video visit  Assessment:       1. History of heart transplant    2. Dyslipidemia    3. Atherosclerosis of native coronary artery of transplanted heart without angina pectoris         Plan:       1) s/p OHT.  The patient report NYHA class 3 symptoms for the last 6 months; he reported these symptoms to Dr. Rodríguez [facility].  -will  proceed with LHC/RHC/IVUS, and biopsy  -R CFA/CFV access  -The risks, benefits, and alternatives of cardiac catheterization  were discussed with the patient.  All questions were answered and informed verbal  consent was obtained.  -written consent will be obtained on the morning of the procedure    2) HTN. Will assess blood pressure when the patient presents for cath    3) Dyslipidemia. 6/22/20 lipid panel reviewed; patient is on Lipitor 80mg po qday

## 2020-07-09 NOTE — H&P (VIEW-ONLY)
Subjective:    Patient ID:  Jamar Smiley is a 29 y.o. male who presents for follow-up of heart transplant    The patient location is:home  The chief complaint leading to consultation is: s/p heart transplant  Visit type: audiovisual    Face to Face time with patient: 30 minutes  60 minutes of total time spent on the encounter, which includes face to face time and non-face to face time preparing to see the patient (eg, review of tests), Obtaining and/or reviewing separately obtained history, Documenting clinical information in the electronic or other health record, Independently interpreting results (not separately reported) and communicating results to the patient/family/caregiver, or Care coordination (not separately reported).         Each patient to whom he or she provides medical services by telemedicine is:  (1) informed of the relationship between the physician and patient and the respective role of any other health care provider with respect to management of the patient; and (2) notified that he or she may decline to receive medical services by telemedicine and may withdraw from such care at any time.    Notes: see below      HPI  Mr. Smiley is a 30 y/o gentleman who underwent OHT in 2010 at [facility].  Today the patient reports shortness of breath X 6 months brought on with exertion such as walking 2-3 blocks that is relieved with rest.  It is also brought on with sexual intercourse to the severity that he must stop.  He denies chest pain.  However the shortness of breath is associated with lightheadedness.  Additionally the patient reports the development of 2 pillow orthopnea over the same period of time.  He reports PND every other night.  He report LE edema up his scrotum. The patient denies recent syncope, but does reports the aforementioned lightheadness with exertion as well palpitations brought on with doing yard work that lasts 5 minutes.  Additionally the patient states he has developed restless  legs during the last 6 minutes. He report when he sleeps his legs start moving uncontrollably.  Finally the patient reports that he would like his pacemaker device(no longer connected, but left inplace after OHT) to be removed.    Past Medical History:   Diagnosis Date    Heart transplanted     Hypertension      Past Surgical History:   Procedure Laterality Date    BIOPSY WITH ULTRASOUND GUIDANCE N/A 11/27/2018    Procedure: BIOPSY, WITH US GUIDANCE;  Surgeon: Raffy Stafford Jr., MD;  Location: [facility] CATH LAB;  Service: Cardiology;  Laterality: N/A;    CARDIAC SURGERY      HEART TRANSPLANT       Current Outpatient Medications on File Prior to Visit   Medication Sig Dispense Refill    aspirin (ECOTRIN) 81 MG EC tablet Take 1 tablet (81 mg total) by mouth once daily. 30 tablet 11    atorvastatin (LIPITOR) 80 MG tablet Take 1 tablet (80 mg total) by mouth every evening. 30 tablet 11    clopidogreL (PLAVIX) 75 mg tablet Take 1 tablet (75 mg total) by mouth once daily. 30 tablet 5    furosemide (LASIX) 80 MG tablet Take 1 tablet (80 mg total) by mouth 2 (two) times daily. 60 tablet 11    lisinopriL 10 MG tablet Take 1 tablet (10 mg total) by mouth once daily. 30 tablet 5    magnesium oxide (MAG-OX) 400 mg (241.3 mg magnesium) tablet Take 1 tablet (400 mg total) by mouth 2 (two) times daily. 60 tablet 11    mycophenolate (CELLCEPT) 250 mg Cap Take 4 capsules (1,000 mg total) by mouth 2 (two) times daily. 240 capsule 5    predniSONE (DELTASONE) 10 MG tablet Take 1 tablet (10 mg total) by mouth once daily. 30 tablet 11    tacrolimus (PROGRAF) 1 MG Cap Take 2 capsules (2 mg total) by mouth every 12 (twelve) hours. 120 capsule 11    zolpidem (AMBIEN) 5 MG Tab Take 1 tablet (5 mg total) by mouth nightly as needed. 30 tablet 0     No current facility-administered medications on file prior to visit.      Review of patient's allergies indicates:   Allergen Reactions    Contrast media Shortness Of Breath,  Itching and Rash     Social History     Tobacco Use    Smoking status: Former Smoker     Quit date: 2018     Years since quittin.2   Substance Use Topics    Alcohol use: No    Drug use: Yes     Types: Marijuana     Comment: several times a day     Family History   Problem Relation Age of Onset    Diabetes Mother     Hypertension Mother     Hypertension Father          Review of Systems   Constitution: Negative for decreased appetite, diaphoresis, fever, malaise/fatigue, weight gain and weight loss.   HENT: Negative for congestion, nosebleeds and sore throat.    Eyes: Negative for blurred vision, vision loss in left eye, vision loss in right eye and visual disturbance.   Cardiovascular: Positive for dyspnea on exertion, orthopnea, palpitations and paroxysmal nocturnal dyspnea. Negative for chest pain, claudication, leg swelling, near-syncope and syncope.   Respiratory: Negative for cough, hemoptysis, shortness of breath and wheezing.    Endocrine: Negative for polyuria.   Hematologic/Lymphatic: Does not bruise/bleed easily.   Skin: Negative for nail changes and rash.   Musculoskeletal: Negative for back pain, muscle cramps and myalgias.   Gastrointestinal: Negative for abdominal pain, change in bowel habit, diarrhea, heartburn, hematemesis, hematochezia, melena, nausea and vomiting.   Genitourinary: Negative for bladder incontinence, dysuria, frequency and hematuria.   Neurological: Positive for light-headedness.   Psychiatric/Behavioral: Negative for depression.   Allergic/Immunologic: Negative for hives.        Objective:    Physical Exam    not performed as this was a telehealth video visit  Assessment:       1. History of heart transplant    2. Dyslipidemia    3. Atherosclerosis of native coronary artery of transplanted heart without angina pectoris         Plan:       1) s/p OHT.  The patient report NYHA class 3 symptoms for the last 6 months; he [facility] these symptoms [facility].  -will  proceed with LHC/RHC/IVUS, and biopsy  -R CFA/CFV access  -The risks, benefits, and alternatives of cardiac catheterization  were discussed with the patient.  All questions were answered and informed verbal  consent was obtained.  -written consent will be obtained on the morning of the procedure    2) HTN. Will assess blood pressure when the patient presents for cath    3) Dyslipidemia. 6/22/20 lipid panel reviewed; patient is on Lipitor 80mg po qday

## 2020-07-10 NOTE — PROGRESS NOTES
OUTPATIENT CATHETERIZATION INSTRUCTIONS    You have been scheduled for a procedure in the catheterization lab on Tuesday, July 21, 2020.     Please report to the Cardiology Waiting Area on the Third floor of the hospital and check in at 6 AM.   You will then be taken to the SSCU (Short Stay Cardiac Unit) and prepared for your procedure. Please be aware that this is not the time of your procedure but the time you are to arrive. The procedures are scheduled on an hourly basis; however, emergency cases take precedence over all other cases.       You may not have anything to eat or drink after midnight the night before your test. You may take your regular morning medications with water. If there are any medications that you should not take you will be instructed to hold them that morning. If you are diabetic and on Metformin (Glucophage) do not take it the day before, the day of, and for 2 days after your procedure.      The procedure will take 1-2 hours to perform. After the procedure, you will return to SSCU on the third floor of the hospital. You will need to lie still (or keep your arm still) for the next 4 to 6 hours to minimize bleeding from the puncture site. Your family may remain in the room with you during this time.       You may be able to be discharged home that same afternoon if there is someone to drive you home and there were no complications. If you have one of the balloon, stent, or device procedures you may spend the night in the hospital. Your doctor will determine, based on your progress, the date and time of your discharge. The results of your procedure will be discussed with you before you are discharged. Any further testing or procedures will be scheduled for you either before you leave or you will be called with these appointments.       If you should have any questions, concerns, or need to change the date of your procedure, please call  SHAE Acevedo @ [phone]    Special  Instructions:    Iodine prep(see orange sheet). Meds called in to pharmacy  Covid 19 test and lab tests at 12pm 7/20/20(See attached appt. Slips  Drink plenty of water the day before and after your procedure.     THE ABOVE INSTRUCTIONS WERE GIVEN TO THE PATIENT VERBALLY AND THEY VERBALIZED UNDERSTANDING.  THEY DO NOT REQUIRE ANY SPECIAL NEEDS AND DO NOT HAVE ANY LEARNING BARRIERS.          Directions for Reporting to Cardiology Waiting Area in the Hospital  If you park in the Parking Garage:  Take elevators to the1st floor of the parking garage.  Continue past the gift shop, coffee shop, and piano.  Take a right and go to the gold elevators. (Elevator B)  Take the elevator to the 3rd floor.  Follow the arrow on the sign on the wall that says Cath Lab Registration/EP Lab Registration.  Follow the long hallway all the way around until you come to a big open area.  This is the registration area.  Check in at Reception Desk.    OR    If family is dropping you off:  Have them drop you off at the front of the Hospital under the green overhang.  Enter through the doors and take a right.  Take the E elevators to the 3rd floor Cardiology Waiting Area.  Check in at the Reception Desk in the waiting room.

## 2020-07-17 NOTE — TELEPHONE ENCOUNTER
Spoke with pt regarding [facility] room for Monday and Tuesday, this was discussed with Yolette Urena  Pt stated understanding and is planning on being here Monday and getting tests completed.

## 2020-07-20 NOTE — TELEPHONE ENCOUNTER
Pt arrived to clinic area asking if he could check into Hotel, I explained that check in time is 3 pm, I explained that he could get his labs, covid test and echo completed as scheduled.  Then go to [facility] and see if they will check him in, but explained it is a Hotel and everyone as to wait to check in.

## 2020-07-20 NOTE — TELEPHONE ENCOUNTER
----- Message from Jelly Fox sent at 7/16/2020  1:21 PM CDT -----  Regarding: Coming for a LHC next week  Pt is coming for annual visit and C   He needs a [facility] room the night for 7/20 and 21 st.  He does not have anyone to drive him home after cath on 21st     Jelly

## 2020-07-20 NOTE — TELEPHONE ENCOUNTER
[facility] set up a  reservation (conf#[id number]) for 7/20-7/22. Pt aware and confirmation sent to pt's email: [email or web address]     SW remains available.

## 2020-07-21 PROBLEM — Z94.1 TRANSPLANTED HEART: Status: ACTIVE | Noted: 2020-01-01

## 2020-07-21 NOTE — DISCHARGE INSTRUCTIONS
1. Do not strain or lift anything greater than 5 lb for 1 week.  2. Do not drive or operate any dangerous machinery for 24 hours.   3. Keep the dressing on, clean, and dry for 24 hours.   4. After 24 hours, the dressing may be removed and a shower is allowed.   5. Clean the area with mild soap and water and then leave open to air. Keep area free of any creams, lotions or ointments. .   6. Once the skin has healed, bathing in a tub or swimming is allowed.   7. Inspect the groin site daily and report to the physician any swelling at the site that   cannot be controlled with manual pressure for 10 minutes, unusual pain at the   access site or affected extremity, unusual swelling at the access site, or signs or   symptoms of infection such as redness, pain, or fever.     Call 911 if you have:   Bleeding from the puncture site that you cannot stop by doing the following:   Relax and lie down right away. Keep your leg flat and apply firm pressure to the site using your fingers and a gauze pad. Keep the pressure on for 20 minutes. Continue this until the bleeding stops. This may take awhile. When bleeding stops, cover the site with a clean bandage and keep your leg still as much as possible.

## 2020-07-21 NOTE — Clinical Note
The PA catheter is inserted into the main pulmonary artery. Hemodynamics performed. O2 saturation measured at 54%.

## 2020-07-21 NOTE — Clinical Note
Catheter is inserted into the and is removed from the left coronary artery  ostium   right coronary artery. Angiography performed of the right coronary arteries.

## 2020-07-21 NOTE — PLAN OF CARE
Patient arrived to room. PIV placed, labs sent. Admit assessment completed. Plan of care discussed with patient. Will monitor.

## 2020-07-21 NOTE — OP NOTE
Brief Operative Note:    : Chris Barahona MD     Referring Physician: Cheryl Anguiano     All Operators: Surgeon(s):  MD Rajendra Dozier MD Christopher M Jenkins, MD Nadia I Abelhad, MD     Preoperative Diagnosis: Transplanted heart [Z94.1]  CAV     Postop Diagnosis: Transplanted heart [Z94.1]  CAV    Treatments/Procedures: Procedure(s) (LRB):  CATHETERIZATION, HEART, LEFT (Left)  IVUS, CORONARY (N/A)  BIOPSY, CARDIAC (N/A)    Findings:  · Severe stenosis mid LAD, Mild-moderate stenosis of proximal LCx, LVEDP >40. LVEDP (Pre): 40  · Severe, diffuse CAD: severe stenosis of mid-distal Lad and moderate stenosis of LCx  · RHC pressures below; pressures are elevated and consistent with restrictive physiology  · Unable to pass biopsy sheath from left CFV into RV due to collateral venous drainage from left lower extremity. 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  Estimated blood loss: <50 mL    Estimated Blood loss: 20 cc    Specimens removed: No    Recommendations:  1) Routine post-cath care  2) DAPT x 1 year  3) no IVF  4) ECG on arrival to floor  5) Cardiac rehab ordered  6) Medical optimization: continue home ASA 81+Plavix 75mg+lasix 80 BID+atorv 80. Low threshold to uptitrate diuretics given elevated LVEDP.     Rosalie Thomas

## 2020-07-21 NOTE — INTERVAL H&P NOTE
The patient has been examined and the H&P has been reviewed:  s/p OHT      The patient report NYHA class 3 symptoms for the last 6 months. Denies any fevers, chills, sore throat or sick contacts  -will proceed with LHC/RHC/IVUS, and biopsy  -R CFA/CFV access  -2+ pulses Carotid Bilat and right radial. 1+ pulse L raidal.  2+ pulse DP bilat. Dopplerable pulses in PT bilat  Mallampati II, ASA-II  -The risks, benefits, and alternatives of cardiac catheterization  were discussed with the patient.  All questions were answered and informed verbal  consent was obtained.  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

## 2020-07-21 NOTE — PLAN OF CARE
Received report from Jace. Patient s/p R/[facility], AAOx3. VSS, no c/o pain or discomfort at this time, resp even and unlabored. Gauze/tegaderm dressing to bilateral groin is CDI. No active bleeding. No hematoma noted. Post procedure protocol reviewed with patient and patient's family. Understanding verbalized. Family members at bedside. Nurse call bell within reach. Will continue to monitor per post procedure protocol.

## 2020-07-21 NOTE — PROGRESS NOTES
Patient ambulated in hallway with standby assist, Bilateral groins remained CDI. No bleeding or hematoma noted. Will monitor.

## 2020-07-21 NOTE — PROGRESS NOTES
Pt DC'd per MD order. Discharge instructions given including activity, wound care, S&S of infections, future appointments, and when to call MD. Medications reviewed including when to take next dose. Telemetry and PIV DC'd, catheter tip intact. Pt refused transport and ambulated off unit with americo.

## 2020-07-24 NOTE — DISCHARGE SUMMARY
[facility]  Cardiology  Discharge Summary      Patient Name: Jamar Smiley  MRN: [id number]  Admission Date: 7/21/2020  Hospital Length of Stay: 0 days  Discharge Date and Time:  07/24/2020 9:31 AM  Attending Physician: Dr. Jos Barahona     Discharging Provider: Rosalie Thomas  Primary Care Physician: Cheryl Anguiano NP    Procedure(s) (LRB):  · Severe stenosis mid LAD, Mild-moderate stenosis of proximal LCx, LVEDP >40. LVEDP (Pre): 40  · Severe, diffuse CAD: severe stenosis of mid-distal Lad and moderate stenosis of LCx  · RHC pressures below; pressures are elevated and consistent with restrictive physiology  · Unable to pass biopsy sheath from left CFV into RV as the left iliac veins are occluded and venous drainage from the left lower extremity is via collaterals collaterals. 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        Hospital Course:  Mr. Smiley is a 28 y/o gentleman who underwent OHT in 2010 at [facility] currently with NYHA Class III symptoms who underwent RHC./LHC/IVUS with above findings.     Recommendations:  1) Routine post-cath care  2) DAPT x 1 year  3) no IVF  4) ECG on arrival to floor  5) Cardiac rehab ordered  6) Medical optimization: continue home ASA 81+Plavix 75mg+lasix 80 BID+atorv 80. Low threshold to uptitrate diuretics given elevated LVEDP.     Service: Interventional       Discharged Condition: good    Disposition:   Home    Follow Up:  No follow-ups on file.    Patient Instructions:   Discharge Instructions and Care of Your Groin After a Cardiac Catheterization Procedure Performed via the Femoral Artery    Catheter Insertion Site  · The morning after your procedure, you may take the dressing off. The easiest way to do this is when you are showering, get the tape and dressing wet and remove it.  · After the bandage is removed, cover the area with a small adhesive bandage. It is normal for the catheter insertion site to be black and blue for a couple of  days. The site may also be slightly swollen and pink, and there may be a small lump (about the size of a quarter) at the site.  · Wash the catheter insertion site at least once daily with soap and water. Place soapy water on your hand or washcloth and gently wash the insertion site; do not rub.  · Keep the area clean and dry when you are not showering.  · Do not use creams, lotions or ointment on the wound site.  · Wear loose clothes and loose underwear.  · Do not take a bath, tub soak, go in a Jacuzzi, or swim in a pool or lake for one week after the procedure.    Activity Instructions  · Do not strain during bowel movements for the first 3 to 4 days after the procedure to prevent bleeding from the catheter insertion site.  · Avoid heavy lifting (more than 10 pounds) and pushing or pulling heavy objects for the first 5 to 7 days after the procedure.  · Do not participate in strenuous activities for 5 days after the procedure. This includes most sports - jogging, golfing, play tennis, and bowling.  · You may climb stairs if needed, but walk up and down the stairs more slowly than usual.  · Gradually increase your activities until you reach your normal activity level within one week after the procedure.      If bleeding should occur following discharge:   Sit down and apply firm pressure to the puncture site with your fingers for 10 minutes    If the bleeding stops, continue to sit quietly, keeping your wrist straight for 2 hours. Notify your physician as soon as possible    If bleeding does not stop after 10 minutes or if there is a large amount of bleeding or spurting, call 911 immediately.  Do not drive yourself to the hospital.     Notify your physician if these symptoms persist or if you experience:   Change in color or temperature of the hand or arm   Redness, heat, or pus at the puncture site   Chills or fever greater than 101.0 F      Jamar Smiley was given education on their disease process and  medications.      Medications:  Reconciled Home Medications:      Medication List      CONTINUE taking these medications    aspirin 81 MG EC tablet  Commonly known as: ECOTRIN  Take 1 tablet (81 mg total) by mouth once daily.     clopidogreL 75 mg tablet  Commonly known as: PLAVIX  Take 1 tablet (75 mg total) by mouth once daily.     furosemide 80 MG tablet  Commonly known as: LASIX  Take 1 tablet (80 mg total) by mouth 2 (two) times daily.     lisinopriL 10 MG tablet  Take 1 tablet (10 mg total) by mouth once daily.     mycophenolate 250 mg Cap  Commonly known as: CELLCEPT  Take 4 capsules (1,000 mg total) by mouth 2 (two) times daily.     tacrolimus 1 MG Cap  Commonly known as: PROGRAF  Take 2 capsules (2 mg total) by mouth every 12 (twelve) hours.     zolpidem 5 MG Tab  Commonly known as: AMBIEN  Take 1 tablet (5 mg total) by mouth nightly as needed.            Time spent on the discharge of patient: 30 minutes    Signed:  Rosalie Thomas M.D.  Cardiology Fellow  [facility]

## 2020-07-27 NOTE — TELEPHONE ENCOUNTER
Pt called stated that his right lower leg from the Knee down is staying swollen, he is concerned because he had the Left Heart Cath last week. He denies pain, redness.   Agreed that he needs to be seen, he is going to [facility] ER.  ----- Message from Ellie Perez MA sent at 7/27/2020 12:11 PM CDT -----  Contact: self  Pt is asking to speak to you,has some questions. Said he had a heart cath last week. on 7/20 & 7/21. Please call [phone].

## 2020-07-27 NOTE — TELEPHONE ENCOUNTER
Attempted to call patient two times concerning edema to lower leg. Left message for patient to call back.

## 2020-07-27 NOTE — TELEPHONE ENCOUNTER
Spoke with patient. He is in the ED near his home. Had ultrasound of leg. Will have the ED MD fax results to DR. Barahona as soon as it is read. Patient will call with his disposition/

## 2020-08-13 NOTE — TELEPHONE ENCOUNTER
Called pt, left a voice message regarding appt's tomorrow and due to timing of 2 appt's at separate locations Canceled Dr. Scott's appt, did ask for him to get labs in the AM asked him to call me back.

## 2020-08-14 NOTE — TELEPHONE ENCOUNTER
I called the patient to verify if he was in route to his appointment or if I could assist in any other way I left patient a detailed v/m with clinic contact information

## 2020-08-18 NOTE — TELEPHONE ENCOUNTER
----- Message from Colette Yee MA sent at 8/18/2020  1:20 PM CDT -----  Regarding: FW: Out stock    ----- Message -----  From: Margareth Jessica  Sent: 8/18/2020   1:05 PM CDT  To: [facility]  Subject: Out stock                                        [facility] does not have Prograf in stock & does not know anywhere locally that might have it. Thanks

## 2020-10-04 NOTE — PROGRESS NOTES
I received a call from [place] ER about patient presenting with worsening dyspnea for the last days.   Patient has a history of rejection in 2018. Apparently patient was not taking his lasix in the last days.  I recommended the ER physician that called to transfer patient to our center to evaluate cause of his current symptoms and exclude life threatening processes.  Physician voiced understanding.

## 2020-10-05 NOTE — TELEPHONE ENCOUNTER
Left 2 separate messages for the pt regarding appt, scheduled a f/u From pt going to ER close to Home on [place] 10/4/20. See previous note.

## 2020-10-05 NOTE — TELEPHONE ENCOUNTER
Returned a phone call to ER MD, message left on [place] 10/4/20 at 1:41 pm, Spoke with MD, Stated he had some edema and that his chest x bri was similar to previous x ray's, He stated that the pt had been doing some yard work and started feeling bad, MD did nit think he needed to be admitted nor transferred, stated that the Cardiac markers where negative, no fever, NSP, does have enlargement of heart but not any different then the chest x bri from [facility].  MD also stated that he gave pt all the records of the tests completed and note.      I attempted to call the pt and left him a voice message to call back.

## 2020-10-06 NOTE — TELEPHONE ENCOUNTER
Called and left a voice message on pt's phone number, since I can not reach Mr. Smiley   Moving appts to next week and mailing an appt slip.

## 2020-10-20 NOTE — TELEPHONE ENCOUNTER
Called pt to check and see if he is on his way, missed 8 am labs and 9:15 x ray, left a voice message and awaiting a call back.

## 2020-12-08 ENCOUNTER — POST MORTEM DOCUMENTATION (OUTPATIENT)
Dept: TRANSPLANT | Facility: CLINIC | Age: 30
End: 2020-12-08

## 2020-12-08 ENCOUNTER — TELEPHONE (OUTPATIENT)
Dept: TRANSPLANT | Facility: CLINIC | Age: 30
End: 2020-12-08

## 2020-12-08 NOTE — TELEPHONE ENCOUNTER
Spoke with pt's wife this morning, pt passed away in his sleep on Sunday, went to take a nap before going to work.

## 2020-12-08 NOTE — PROGRESS NOTES
Spoke with pt's wife this morning, pt was taking a nap prior to going to work on 12/6/20 and passed away in his sleep.

## (undated) DEVICE — KIT SHEATH 9FRX11CM

## (undated) DEVICE — FORCEP ENDOMYOCARD BIOPSY7F AD

## (undated) DEVICE — SEE MEDLINE ITEM 156894

## (undated) DEVICE — KIT CO-PILOT

## (undated) DEVICE — FORCEP BIOPSY 50CM

## (undated) DEVICE — GUIDEWIRE EMERALD 150CM PTFE

## (undated) DEVICE — SHEATH INTRODUCER 7FR 11CM

## (undated) DEVICE — SHEATH INTRODUCER 4FR 11CM

## (undated) DEVICE — SHEATH INTRODUCER 6FR 11CM

## (undated) DEVICE — SPIKE CONTRAST CONTROLLER

## (undated) DEVICE — KIT PROBE COVER WITH GEL

## (undated) DEVICE — GUIDEWIRE .021X260CM J-3MM TIP

## (undated) DEVICE — PROTECTION STATION PLUS

## (undated) DEVICE — STOPCOCK 3-WAY

## (undated) DEVICE — SHEATH 7FR 98CM

## (undated) DEVICE — DRESSING TRANS 4X4 TEGADERM

## (undated) DEVICE — CATH SWAN GANZ STND 7FR

## (undated) DEVICE — CATH DXTERITY JR40 100CM 6FR

## (undated) DEVICE — KIT CUSTOM MANIFOLD

## (undated) DEVICE — KIT MICROINTRO 4F .018X40X7CM

## (undated) DEVICE — CATH ANG PIGTAIL 4FR INFINITY

## (undated) DEVICE — CATH DXTERITY PG145 110CM 6FR

## (undated) DEVICE — GUIDE LAUNCHER 6FR JL 4.0

## (undated) DEVICE — CONTRAST VISIPAQUE 150ML

## (undated) DEVICE — SHEATH INTRODUCER 5F 10CM

## (undated) DEVICE — KIT MICROINTRODUCE MINI 5X10CM

## (undated) DEVICE — GUIDE WIRE BMW 014 X190

## (undated) DEVICE — CATH EAGLE EYE PLATINUM

## (undated) DEVICE — GUIDEWIRE SUPRA CORE 035 190CM

## (undated) DEVICE — LINE 60IN PRESSURE MON.

## (undated) DEVICE — INTRODUCER 7FR 45CM